# Patient Record
Sex: FEMALE | Race: WHITE | NOT HISPANIC OR LATINO | Employment: OTHER | URBAN - NONMETROPOLITAN AREA
[De-identification: names, ages, dates, MRNs, and addresses within clinical notes are randomized per-mention and may not be internally consistent; named-entity substitution may affect disease eponyms.]

---

## 2017-02-27 ENCOUNTER — APPOINTMENT (OUTPATIENT)
Dept: CT IMAGING | Facility: HOSPITAL | Age: 47
End: 2017-02-27

## 2017-02-27 ENCOUNTER — HOSPITAL ENCOUNTER (EMERGENCY)
Facility: HOSPITAL | Age: 47
Discharge: HOME | End: 2017-02-27
Attending: EMERGENCY MEDICINE | Admitting: FAMILY MEDICINE

## 2017-02-27 ENCOUNTER — APPOINTMENT (OUTPATIENT)
Dept: GENERAL RADIOLOGY | Facility: HOSPITAL | Age: 47
End: 2017-02-27

## 2017-02-27 VITALS
RESPIRATION RATE: 18 BRPM | DIASTOLIC BLOOD PRESSURE: 79 MMHG | HEART RATE: 66 BPM | HEIGHT: 63 IN | SYSTOLIC BLOOD PRESSURE: 105 MMHG | WEIGHT: 160 LBS | TEMPERATURE: 98.1 F | BODY MASS INDEX: 28.35 KG/M2 | OXYGEN SATURATION: 100 %

## 2017-02-27 DIAGNOSIS — R07.9 CHEST PAIN OF UNKNOWN ETIOLOGY: Primary | ICD-10-CM

## 2017-02-27 LAB
ALBUMIN SERPL-MCNC: 3.8 G/DL (ref 3.5–5)
ALBUMIN/GLOB SERPL: 1.2 G/DL (ref 1.1–2.5)
ALP SERPL-CCNC: 59 U/L (ref 24–120)
ALT SERPL W P-5'-P-CCNC: 21 U/L (ref 0–54)
ANION GAP SERPL CALCULATED.3IONS-SCNC: 9 MMOL/L (ref 4–13)
AST SERPL-CCNC: 18 U/L (ref 7–45)
BASOPHILS # BLD AUTO: 0.02 10*3/MM3 (ref 0–0.2)
BASOPHILS NFR BLD AUTO: 0.3 % (ref 0–2)
BILIRUB SERPL-MCNC: 0.7 MG/DL (ref 0.1–1)
BUN BLD-MCNC: 13 MG/DL (ref 5–21)
BUN/CREAT SERPL: 20 (ref 7–25)
CALCIUM SPEC-SCNC: 9 MG/DL (ref 8.4–10.4)
CHLORIDE SERPL-SCNC: 110 MMOL/L (ref 98–110)
CO2 SERPL-SCNC: 22 MMOL/L (ref 24–31)
CREAT BLD-MCNC: 0.65 MG/DL (ref 0.5–1.4)
D DIMER PPP FEU-MCNC: <0.22 MG/L (FEU) (ref 0–0.5)
DEPRECATED RDW RBC AUTO: 48.2 FL (ref 40–54)
EOSINOPHIL # BLD AUTO: 0.69 10*3/MM3 (ref 0–0.7)
EOSINOPHIL NFR BLD AUTO: 10.9 % (ref 0–4)
ERYTHROCYTE [DISTWIDTH] IN BLOOD BY AUTOMATED COUNT: 14.7 % (ref 12–15)
GFR SERPL CREATININE-BSD FRML MDRD: 99 ML/MIN/1.73
GLOBULIN UR ELPH-MCNC: 3.1 GM/DL
GLUCOSE BLD-MCNC: 83 MG/DL (ref 70–100)
HCT VFR BLD AUTO: 42.3 % (ref 37–47)
HGB BLD-MCNC: 14.2 G/DL (ref 12–16)
HOLD SPECIMEN: NORMAL
HOLD SPECIMEN: NORMAL
IMM GRANULOCYTES # BLD: 0 10*3/MM3 (ref 0–0.03)
IMM GRANULOCYTES NFR BLD: 0 % (ref 0–5)
INR PPP: 0.9 (ref 0.91–1.09)
LYMPHOCYTES # BLD AUTO: 1.95 10*3/MM3 (ref 0.72–4.86)
LYMPHOCYTES NFR BLD AUTO: 30.8 % (ref 15–45)
MCH RBC QN AUTO: 30 PG (ref 28–32)
MCHC RBC AUTO-ENTMCNC: 33.6 G/DL (ref 33–36)
MCV RBC AUTO: 89.4 FL (ref 82–98)
MONOCYTES # BLD AUTO: 0.35 10*3/MM3 (ref 0.19–1.3)
MONOCYTES NFR BLD AUTO: 5.5 % (ref 4–12)
NEUTROPHILS # BLD AUTO: 3.33 10*3/MM3 (ref 1.87–8.4)
NEUTROPHILS NFR BLD AUTO: 52.5 % (ref 39–78)
PLAT MORPH BLD: NORMAL
PLATELET # BLD AUTO: 157 10*3/MM3 (ref 130–400)
PMV BLD AUTO: 0 FL (ref 6–12)
POTASSIUM BLD-SCNC: 4 MMOL/L (ref 3.5–5.3)
PROT SERPL-MCNC: 6.9 G/DL (ref 6.3–8.7)
PROTHROMBIN TIME: 12.4 SECONDS (ref 11.9–14.6)
RBC # BLD AUTO: 4.73 10*6/MM3 (ref 4.2–5.4)
RBC MORPH BLD: NORMAL
SODIUM BLD-SCNC: 141 MMOL/L (ref 135–145)
TROPONIN I SERPL-MCNC: 0 NG/ML (ref 0–0.07)
TROPONIN I SERPL-MCNC: 0 NG/ML (ref 0–0.07)
WBC MORPH BLD: NORMAL
WBC NRBC COR # BLD: 6.34 10*3/MM3 (ref 4.8–10.8)
WHOLE BLOOD HOLD SPECIMEN: NORMAL
WHOLE BLOOD HOLD SPECIMEN: NORMAL

## 2017-02-27 PROCEDURE — 93005 ELECTROCARDIOGRAM TRACING: CPT | Performed by: FAMILY MEDICINE

## 2017-02-27 PROCEDURE — 93005 ELECTROCARDIOGRAM TRACING: CPT

## 2017-02-27 PROCEDURE — 71010 HC CHEST PA OR AP: CPT

## 2017-02-27 PROCEDURE — 0 IOPAMIDOL PER 1 ML: Performed by: FAMILY MEDICINE

## 2017-02-27 PROCEDURE — 85379 FIBRIN DEGRADATION QUANT: CPT | Performed by: EMERGENCY MEDICINE

## 2017-02-27 PROCEDURE — 93010 ELECTROCARDIOGRAM REPORT: CPT | Performed by: INTERNAL MEDICINE

## 2017-02-27 PROCEDURE — 80053 COMPREHEN METABOLIC PANEL: CPT | Performed by: EMERGENCY MEDICINE

## 2017-02-27 PROCEDURE — 96360 HYDRATION IV INFUSION INIT: CPT

## 2017-02-27 PROCEDURE — 84484 ASSAY OF TROPONIN QUANT: CPT

## 2017-02-27 PROCEDURE — 71275 CT ANGIOGRAPHY CHEST: CPT

## 2017-02-27 PROCEDURE — 85610 PROTHROMBIN TIME: CPT | Performed by: EMERGENCY MEDICINE

## 2017-02-27 PROCEDURE — 99284 EMERGENCY DEPT VISIT MOD MDM: CPT

## 2017-02-27 PROCEDURE — 85007 BL SMEAR W/DIFF WBC COUNT: CPT | Performed by: EMERGENCY MEDICINE

## 2017-02-27 PROCEDURE — 96361 HYDRATE IV INFUSION ADD-ON: CPT

## 2017-02-27 PROCEDURE — 36415 COLL VENOUS BLD VENIPUNCTURE: CPT

## 2017-02-27 PROCEDURE — 85025 COMPLETE CBC W/AUTO DIFF WBC: CPT | Performed by: EMERGENCY MEDICINE

## 2017-02-27 RX ORDER — BUTALBITAL, ACETAMINOPHEN AND CAFFEINE 50; 325; 40 MG/1; MG/1; MG/1
1 TABLET ORAL DAILY PRN
COMMUNITY

## 2017-02-27 RX ORDER — MECLIZINE HYDROCHLORIDE 25 MG/1
25 TABLET ORAL 3 TIMES DAILY PRN
COMMUNITY

## 2017-02-27 RX ORDER — OXYCODONE AND ACETAMINOPHEN 10; 325 MG/1; MG/1
1 TABLET ORAL 3 TIMES DAILY PRN
Status: ON HOLD | COMMUNITY
End: 2021-12-20

## 2017-02-27 RX ORDER — ZOLPIDEM TARTRATE 10 MG/1
10 TABLET ORAL NIGHTLY PRN
COMMUNITY
End: 2019-08-19

## 2017-02-27 RX ORDER — DULOXETIN HYDROCHLORIDE 60 MG/1
90 CAPSULE, DELAYED RELEASE ORAL NIGHTLY
COMMUNITY

## 2017-02-27 RX ORDER — GABAPENTIN 300 MG/1
300 CAPSULE ORAL 3 TIMES DAILY
COMMUNITY

## 2017-02-27 RX ORDER — SODIUM CHLORIDE 0.9 % (FLUSH) 0.9 %
10 SYRINGE (ML) INJECTION AS NEEDED
Status: DISCONTINUED | OUTPATIENT
Start: 2017-02-27 | End: 2017-02-27 | Stop reason: HOSPADM

## 2017-02-27 RX ORDER — TIZANIDINE 4 MG/1
4 TABLET ORAL 3 TIMES DAILY PRN
COMMUNITY
End: 2019-08-19

## 2017-02-27 RX ORDER — ASPIRIN 81 MG/1
324 TABLET, CHEWABLE ORAL ONCE
Status: DISCONTINUED | OUTPATIENT
Start: 2017-02-27 | End: 2017-02-27 | Stop reason: HOSPADM

## 2017-02-27 RX ORDER — TOPIRAMATE 50 MG/1
100 TABLET, FILM COATED ORAL DAILY
COMMUNITY

## 2017-02-27 RX ADMIN — SODIUM CHLORIDE 500 ML: 0.9 INJECTION, SOLUTION INTRAVENOUS at 20:40

## 2017-02-27 RX ADMIN — SODIUM CHLORIDE 750 ML: 9 INJECTION, SOLUTION INTRAVENOUS at 17:57

## 2017-02-27 RX ADMIN — IOPAMIDOL 100 ML: 755 INJECTION, SOLUTION INTRAVENOUS at 19:37

## 2017-03-02 ENCOUNTER — HOSPITAL ENCOUNTER (OUTPATIENT)
Dept: CARDIOLOGY | Facility: HOSPITAL | Age: 47
Discharge: HOME | End: 2017-03-02
Attending: FAMILY MEDICINE | Admitting: FAMILY MEDICINE

## 2017-03-02 VITALS — DIASTOLIC BLOOD PRESSURE: 66 MMHG | HEART RATE: 65 BPM | SYSTOLIC BLOOD PRESSURE: 95 MMHG

## 2017-03-02 DIAGNOSIS — R07.9 CHEST PAIN OF UNKNOWN ETIOLOGY: ICD-10-CM

## 2017-03-02 PROCEDURE — 93352 ADMIN ECG CONTRAST AGENT: CPT | Performed by: INTERNAL MEDICINE

## 2017-03-02 PROCEDURE — 25010000002 PERFLUTREN (DEFINITY) 8.476 MG IN SODIUM CHLORIDE 10 ML INJECTION: Performed by: INTERNAL MEDICINE

## 2017-03-02 PROCEDURE — 93350 STRESS TTE ONLY: CPT

## 2017-03-02 PROCEDURE — 93351 STRESS TTE COMPLETE: CPT

## 2017-03-02 PROCEDURE — 93350 STRESS TTE ONLY: CPT | Performed by: INTERNAL MEDICINE

## 2017-03-02 PROCEDURE — 93018 CV STRESS TEST I&R ONLY: CPT | Performed by: INTERNAL MEDICINE

## 2017-03-02 PROCEDURE — 93017 CV STRESS TEST TRACING ONLY: CPT

## 2017-03-02 RX ADMIN — SODIUM CHLORIDE 5 ML: 9 INJECTION INTRAMUSCULAR; INTRAVENOUS; SUBCUTANEOUS at 14:04

## 2017-03-02 RX ADMIN — SODIUM CHLORIDE 5 ML: 9 INJECTION INTRAMUSCULAR; INTRAVENOUS; SUBCUTANEOUS at 13:50

## 2017-03-03 LAB
BH CV ECHO MEAS - BSA(HAYCOCK): 1.8 M^2
BH CV ECHO MEAS - BSA: 1.8 M^2
BH CV ECHO MEAS - BZI_BMI: 28.3 KILOGRAMS/M^2
BH CV ECHO MEAS - BZI_METRIC_HEIGHT: 160 CM
BH CV ECHO MEAS - BZI_METRIC_WEIGHT: 72.6 KG
BH CV STRESS BP STAGE 1: NORMAL
BH CV STRESS BP STAGE 2: NORMAL
BH CV STRESS DURATION MIN STAGE 1: 3
BH CV STRESS DURATION MIN STAGE 2: 2
BH CV STRESS DURATION SEC STAGE 1: 0
BH CV STRESS DURATION SEC STAGE 2: 39
BH CV STRESS GRADE STAGE 1: 10
BH CV STRESS GRADE STAGE 2: 12
BH CV STRESS HR STAGE 1: 107
BH CV STRESS HR STAGE 2: 134
BH CV STRESS METS STAGE 1: 5
BH CV STRESS METS STAGE 2: 7.5
BH CV STRESS PROTOCOL 1: NORMAL
BH CV STRESS RECOVERY BP: NORMAL MMHG
BH CV STRESS RECOVERY HR: 73 BPM
BH CV STRESS SPEED STAGE 1: 1.7
BH CV STRESS SPEED STAGE 2: 2.5
BH CV STRESS STAGE 1: 1
BH CV STRESS STAGE 2: 2
MAXIMAL PREDICTED HEART RATE: 175 BPM
PERCENT MAX PREDICTED HR: 76.57 %
STRESS BASELINE BP: NORMAL MMHG
STRESS BASELINE HR: 68 BPM
STRESS PERCENT HR: 90 %
STRESS POST ESTIMATED WORKLOAD: 7.5 METS
STRESS POST EXERCISE DUR MIN: 5 MIN
STRESS POST EXERCISE DUR SEC: 39 SEC
STRESS POST PEAK BP: NORMAL MMHG
STRESS POST PEAK HR: 134 BPM
STRESS TARGET HR: 149 BPM

## 2017-03-06 ENCOUNTER — APPOINTMENT (OUTPATIENT)
Dept: CARDIOLOGY | Facility: HOSPITAL | Age: 47
End: 2017-03-06
Attending: FAMILY MEDICINE

## 2019-08-19 ENCOUNTER — HOSPITAL ENCOUNTER (EMERGENCY)
Facility: HOSPITAL | Age: 49
Discharge: HOME | End: 2019-08-19
Attending: EMERGENCY MEDICINE | Admitting: EMERGENCY MEDICINE

## 2019-08-19 ENCOUNTER — APPOINTMENT (OUTPATIENT)
Dept: GENERAL RADIOLOGY | Facility: HOSPITAL | Age: 49
End: 2019-08-19

## 2019-08-19 VITALS
SYSTOLIC BLOOD PRESSURE: 123 MMHG | BODY MASS INDEX: 35.44 KG/M2 | TEMPERATURE: 97.8 F | HEART RATE: 65 BPM | RESPIRATION RATE: 18 BRPM | WEIGHT: 200 LBS | HEIGHT: 63 IN | OXYGEN SATURATION: 98 % | DIASTOLIC BLOOD PRESSURE: 89 MMHG

## 2019-08-19 DIAGNOSIS — R07.9 CHEST PAIN DUE TO GERD: Primary | ICD-10-CM

## 2019-08-19 DIAGNOSIS — K21.9 CHEST PAIN DUE TO GERD: Primary | ICD-10-CM

## 2019-08-19 LAB
ALBUMIN SERPL-MCNC: 3.6 G/DL (ref 3.5–5)
ALBUMIN/GLOB SERPL: 1.1 G/DL (ref 1.1–2.5)
ALP SERPL-CCNC: 67 U/L (ref 24–120)
ALT SERPL W P-5'-P-CCNC: 52 U/L (ref 0–54)
ANION GAP SERPL CALCULATED.3IONS-SCNC: 8 MMOL/L (ref 4–13)
AST SERPL-CCNC: 49 U/L (ref 7–45)
BASOPHILS # BLD AUTO: 0.03 10*3/MM3 (ref 0–0.2)
BASOPHILS NFR BLD AUTO: 0.4 % (ref 0–1.5)
BILIRUB SERPL-MCNC: 0.4 MG/DL (ref 0.1–1)
BUN BLD-MCNC: 11 MG/DL (ref 5–21)
BUN/CREAT SERPL: 13.3 (ref 7–25)
CALCIUM SPEC-SCNC: 9.1 MG/DL (ref 8.4–10.4)
CHLORIDE SERPL-SCNC: 109 MMOL/L (ref 98–110)
CO2 SERPL-SCNC: 24 MMOL/L (ref 24–31)
CREAT BLD-MCNC: 0.83 MG/DL (ref 0.5–1.4)
DEPRECATED RDW RBC AUTO: 70.2 FL (ref 37–54)
EOSINOPHIL # BLD AUTO: 0.53 10*3/MM3 (ref 0–0.4)
EOSINOPHIL NFR BLD AUTO: 6.4 % (ref 0.3–6.2)
ERYTHROCYTE [DISTWIDTH] IN BLOOD BY AUTOMATED COUNT: 20.9 % (ref 12.3–15.4)
GFR SERPL CREATININE-BSD FRML MDRD: 74 ML/MIN/1.73
GLOBULIN UR ELPH-MCNC: 3.3 GM/DL
GLUCOSE BLD-MCNC: 88 MG/DL (ref 70–100)
HCT VFR BLD AUTO: 36.1 % (ref 34–46.6)
HGB BLD-MCNC: 11.3 G/DL (ref 12–15.9)
HOLD SPECIMEN: NORMAL
HOLD SPECIMEN: NORMAL
IMM GRANULOCYTES # BLD AUTO: 0.03 10*3/MM3 (ref 0–0.05)
IMM GRANULOCYTES NFR BLD AUTO: 0.4 % (ref 0–0.5)
LIPASE SERPL-CCNC: 114 U/L (ref 23–203)
LYMPHOCYTES # BLD AUTO: 2.79 10*3/MM3 (ref 0.7–3.1)
LYMPHOCYTES NFR BLD AUTO: 33.7 % (ref 19.6–45.3)
MCH RBC QN AUTO: 28.7 PG (ref 26.6–33)
MCHC RBC AUTO-ENTMCNC: 31.3 G/DL (ref 31.5–35.7)
MCV RBC AUTO: 91.6 FL (ref 79–97)
MONOCYTES # BLD AUTO: 0.56 10*3/MM3 (ref 0.1–0.9)
MONOCYTES NFR BLD AUTO: 6.8 % (ref 5–12)
NEUTROPHILS # BLD AUTO: 4.34 10*3/MM3 (ref 1.7–7)
NEUTROPHILS NFR BLD AUTO: 52.3 % (ref 42.7–76)
NRBC BLD AUTO-RTO: 0 /100 WBC (ref 0–0.2)
PLATELET # BLD AUTO: 202 10*3/MM3 (ref 140–450)
PMV BLD AUTO: 14.7 FL (ref 6–12)
POTASSIUM BLD-SCNC: 3.6 MMOL/L (ref 3.5–5.3)
PROT SERPL-MCNC: 6.9 G/DL (ref 6.3–8.7)
RBC # BLD AUTO: 3.94 10*6/MM3 (ref 3.77–5.28)
SODIUM BLD-SCNC: 141 MMOL/L (ref 135–145)
TROPONIN I SERPL-MCNC: <0.012 NG/ML (ref 0–0.03)
WBC NRBC COR # BLD: 8.28 10*3/MM3 (ref 3.4–10.8)
WHOLE BLOOD HOLD SPECIMEN: NORMAL
WHOLE BLOOD HOLD SPECIMEN: NORMAL

## 2019-08-19 PROCEDURE — 83690 ASSAY OF LIPASE: CPT | Performed by: EMERGENCY MEDICINE

## 2019-08-19 PROCEDURE — 93010 ELECTROCARDIOGRAM REPORT: CPT | Performed by: INTERNAL MEDICINE

## 2019-08-19 PROCEDURE — 84484 ASSAY OF TROPONIN QUANT: CPT | Performed by: EMERGENCY MEDICINE

## 2019-08-19 PROCEDURE — 85025 COMPLETE CBC W/AUTO DIFF WBC: CPT | Performed by: EMERGENCY MEDICINE

## 2019-08-19 PROCEDURE — 71045 X-RAY EXAM CHEST 1 VIEW: CPT

## 2019-08-19 PROCEDURE — 93005 ELECTROCARDIOGRAM TRACING: CPT

## 2019-08-19 PROCEDURE — 80053 COMPREHEN METABOLIC PANEL: CPT | Performed by: EMERGENCY MEDICINE

## 2019-08-19 PROCEDURE — 99284 EMERGENCY DEPT VISIT MOD MDM: CPT

## 2019-08-19 RX ORDER — OMEPRAZOLE 20 MG/1
20 CAPSULE, DELAYED RELEASE ORAL 2 TIMES DAILY
Qty: 30 CAPSULE | Refills: 0 | Status: ON HOLD | OUTPATIENT
Start: 2019-08-19 | End: 2021-12-21

## 2019-08-19 RX ORDER — SODIUM CHLORIDE 0.9 % (FLUSH) 0.9 %
10 SYRINGE (ML) INJECTION AS NEEDED
Status: DISCONTINUED | OUTPATIENT
Start: 2019-08-19 | End: 2019-08-19 | Stop reason: HOSPADM

## 2019-08-19 RX ORDER — PANTOPRAZOLE SODIUM 40 MG/1
40 TABLET, DELAYED RELEASE ORAL ONCE
Status: COMPLETED | OUTPATIENT
Start: 2019-08-19 | End: 2019-08-19

## 2019-08-19 RX ADMIN — PANTOPRAZOLE SODIUM 40 MG: 40 TABLET, DELAYED RELEASE ORAL at 06:22

## 2019-08-19 NOTE — ED PROVIDER NOTES
Subjective   Patient complains of chest pain that began yesterday and is continued all during the day and during the night until this morning when she finally came in to be checked out.  She says she first thought it was her reflux disease but then says she is been taking some zantac and has not helped so she was worried it was not her reflux disease.  She points to the upper abdomen and up into the chest and describes it is a dull pain.  She says is worse whenever she swallows and it feels like she gets something hung in her throat and then has a tough time going through whenever she swallows even though there is nothing in her throat.  She then says that the pain is continued since the reflux medicine was not helping she then became convinced it was her heart or her pacemaker and came in to be checked out.        History provided by:  Patient   used: No    Chest Pain   Pain location:  Substernal area  Pain quality: aching and dull    Pain radiates to:  Does not radiate  Pain severity:  Severe  Onset quality:  Gradual  Duration:  1 day  Timing:  Constant  Progression:  Unchanged  Chronicity:  New  Context: not breathing, not drug use, not eating, not intercourse, not lifting, not movement, not raising an arm, not at rest, not stress and not trauma    Relieved by:  Nothing  Worsened by:  Nothing (swallowing)  Ineffective treatments:  None tried  Associated symptoms: abdominal pain and heartburn    Associated symptoms: no AICD problem, no anxiety, no back pain, no claudication, no dysphagia, no fatigue, no fever, no nausea, no numbness, no palpitations, no PND, no shortness of breath and no vomiting    Risk factors: no aortic disease, no birth control, no coronary artery disease, no high cholesterol, no hypertension, no immobilization, not male, not obese, not pregnant and no prior DVT/PE        Review of Systems   Constitutional: Negative.  Negative for fatigue and fever.   HENT: Negative.   Negative for trouble swallowing.    Respiratory: Negative.  Negative for shortness of breath.    Cardiovascular: Positive for chest pain. Negative for palpitations, claudication and PND.   Gastrointestinal: Positive for abdominal pain and heartburn. Negative for nausea and vomiting.   Genitourinary: Negative.    Musculoskeletal: Negative.  Negative for back pain.   Skin: Negative.    Neurological: Negative.  Negative for numbness.   Psychiatric/Behavioral: Negative.    All other systems reviewed and are negative.      Past Medical History:   Diagnosis Date   • Anxiety    • GERD (gastroesophageal reflux disease)    • Injury of back    • Neuropathy    • Spondylisthesis        Allergies   Allergen Reactions   • Erythromycin Shortness Of Breath   • Penicillins Hives   • Ampicillin Hives       Past Surgical History:   Procedure Laterality Date   • BREAST SURGERY     • CHOLECYSTECTOMY     • DILATATION AND CURETTAGE     • GASTRIC SLEEVE LAPAROSCOPIC     • INSERT / REPLACE / REMOVE PACEMAKER     • PACEMAKER IMPLANTATION         History reviewed. No pertinent family history.    Social History     Socioeconomic History   • Marital status:      Spouse name: Not on file   • Number of children: Not on file   • Years of education: Not on file   • Highest education level: Not on file   Tobacco Use   • Smoking status: Current Every Day Smoker     Packs/day: 1.00     Types: Cigarettes   Substance and Sexual Activity   • Alcohol use: No   • Drug use: No   • Sexual activity: Defer       Prior to Admission medications    Medication Sig Start Date End Date Taking? Authorizing Provider   butalbital-acetaminophen-caffeine (FIORICET, ESGIC) -40 MG per tablet Take 1 tablet by mouth Daily As Needed for headaches.   Yes ProviderArslan MD   DULoxetine (CYMBALTA) 60 MG capsule Take 60 mg by mouth Every Night.   Yes ProviderArslan MD   gabapentin (NEURONTIN) 300 MG capsule Take 300 mg by mouth 3 (Three) Times a Day.    Yes Arslan Anderson MD   meclizine (ANTIVERT) 25 MG tablet Take 25 mg by mouth 3 (Three) Times a Day As Needed for dizziness.   Yes Arslan Anderson MD   oxyCODONE-acetaminophen (PERCOCET)  MG per tablet Take 1 tablet by mouth 3 (Three) Times a Day As Needed for moderate pain (4-6).   Yes Arslan Anderson MD   topiramate (TOPAMAX) 50 MG tablet Take 50 mg by mouth 2 (Two) Times a Day.   Yes Arslan Anderson MD   tiZANidine (ZANAFLEX) 4 MG tablet Take 4 mg by mouth 3 (Three) Times a Day As Needed for muscle spasms.  8/19/19  Arslan Anderson MD   zolpidem (AMBIEN) 10 MG tablet Take 10 mg by mouth At Night As Needed for sleep.  8/19/19  Arslan Anderson MD       Medications   sodium chloride 0.9 % flush 10 mL (not administered)   pantoprazole (PROTONIX) EC tablet 40 mg (not administered)       Vitals:    08/19/19 0517   BP: 139/89   Pulse:    Resp: 18   Temp:    SpO2: 100%         Objective   Physical Exam   Constitutional: She is oriented to person, place, and time. She appears well-developed and well-nourished.   HENT:   Head: Normocephalic and atraumatic.   Neck: Normal range of motion.   Cardiovascular: Normal rate and regular rhythm.   Pulmonary/Chest: Effort normal and breath sounds normal.   Abdominal: Soft. Bowel sounds are normal.   Musculoskeletal: Normal range of motion.   Neurological: She is alert and oriented to person, place, and time.   Skin: Skin is warm and dry.   Psychiatric: She has a normal mood and affect. Her behavior is normal.   Nursing note and vitals reviewed.      Procedures         Lab Results (last 24 hours)     Procedure Component Value Units Date/Time    CBC & Differential [[id number]] Collected:  08/19/19 0514    Specimen:  Blood Updated:  08/19/19 0532    Narrative:       The following orders were created for panel order CBC & Differential.  Procedure                               Abnormality         Status                     ---------                                -----------         ------                     CBC Auto Differential[[id number]]        Abnormal            Final result                 Please view results for these tests on the individual orders.    Comprehensive Metabolic Panel [[id number]]  (Abnormal) Collected:  08/19/19 0514    Specimen:  Blood Updated:  08/19/19 0539     Glucose 88 mg/dL      BUN 11 mg/dL      Creatinine 0.83 mg/dL      Sodium 141 mmol/L      Potassium 3.6 mmol/L      Chloride 109 mmol/L      CO2 24.0 mmol/L      Calcium 9.1 mg/dL      Total Protein 6.9 g/dL      Albumin 3.60 g/dL      ALT (SGPT) 52 U/L      AST (SGOT) 49 U/L      Alkaline Phosphatase 67 U/L      Total Bilirubin 0.4 mg/dL      eGFR Non African Amer 74 mL/min/1.73      Globulin 3.3 gm/dL      A/G Ratio 1.1 g/dL      BUN/Creatinine Ratio 13.3     Anion Gap 8.0 mmol/L     Narrative:       GFR Normal >60  Chronic Kidney Disease <60  Kidney Failure <15    Lipase [[id number]]  (Normal) Collected:  08/19/19 0514    Specimen:  Blood Updated:  08/19/19 0539     Lipase 114 U/L     Troponin [[id number]]  (Normal) Collected:  08/19/19 0514    Specimen:  Blood Updated:  08/19/19 0550     Troponin I <0.012 ng/mL     CBC Auto Differential [[id number]]  (Abnormal) Collected:  08/19/19 0514    Specimen:  Blood Updated:  08/19/19 0532     WBC 8.28 10*3/mm3      RBC 3.94 10*6/mm3      Hemoglobin 11.3 g/dL      Hematocrit 36.1 %      MCV 91.6 fL      MCH 28.7 pg      MCHC 31.3 g/dL      RDW 20.9 %      RDW-SD 70.2 fl      MPV 14.7 fL      Platelets 202 10*3/mm3      Neutrophil % 52.3 %      Lymphocyte % 33.7 %      Monocyte % 6.8 %      Eosinophil % 6.4 %      Basophil % 0.4 %      Immature Grans % 0.4 %      Neutrophils, Absolute 4.34 10*3/mm3      Lymphocytes, Absolute 2.79 10*3/mm3      Monocytes, Absolute 0.56 10*3/mm3      Eosinophils, Absolute 0.53 10*3/mm3      Basophils, Absolute 0.03 10*3/mm3      Immature Grans, Absolute 0.03 10*3/mm3      nRBC 0.0 /100 WBC           XR  Chest 1 View   ED Interpretation   NAD          ED Course  ED Course as of Aug 19 0615   Mon Aug 19, 2019   0613 I told the patient that her testing here is all been perfectly normal.  Specifically her EKG is perfectly normal and her troponin is also 0 despite having had pain in her chest for almost 24 hours from what she describes to me.  I do not believe this is cardiac.  I believe this is reflux related.  She specifically says that the pain goes up into her throat and hurts worse whenever she swallows.  I will increase her antireflux medicine.  I will advise her to follow-up with her family physician for further evaluation as indicated.  She is discharged in stable condition.  [TR]      ED Course User Index  [TR] Eric Crews Jr., MD          MDM  Number of Diagnoses or Management Options  Chest pain due to GERD: new and requires workup     Amount and/or Complexity of Data Reviewed  Clinical lab tests: ordered and reviewed  Tests in the radiology section of CPT®: ordered and reviewed  Tests in the medicine section of CPT®: reviewed and ordered    Risk of Complications, Morbidity, and/or Mortality  Presenting problems: moderate  Diagnostic procedures: moderate  Management options: moderate    Patient Progress  Patient progress: stable      Final diagnoses:   Chest pain due to GERD          Eric Crews Jr., MD  08/19/19 0615

## 2021-12-20 ENCOUNTER — APPOINTMENT (OUTPATIENT)
Dept: CT IMAGING | Facility: HOSPITAL | Age: 51
End: 2021-12-20

## 2021-12-20 ENCOUNTER — HOSPITAL ENCOUNTER (INPATIENT)
Facility: HOSPITAL | Age: 51
LOS: 2 days | Discharge: HOME | End: 2021-12-22
Attending: EMERGENCY MEDICINE | Admitting: INTERNAL MEDICINE

## 2021-12-20 DIAGNOSIS — I26.99 PULMONARY EMBOLISM, BILATERAL (HCC): Primary | ICD-10-CM

## 2021-12-20 DIAGNOSIS — R06.00 DYSPNEA, UNSPECIFIED TYPE: ICD-10-CM

## 2021-12-20 PROBLEM — G89.29 CHRONIC BACK PAIN: Status: ACTIVE | Noted: 2021-12-20

## 2021-12-20 PROBLEM — E66.01 OBESITY, CLASS III, BMI 40-49.9 (MORBID OBESITY) (HCC): Status: ACTIVE | Noted: 2021-12-20

## 2021-12-20 PROBLEM — M54.9 CHRONIC BACK PAIN: Status: ACTIVE | Noted: 2021-12-20

## 2021-12-20 PROBLEM — Z72.0 TOBACCO ABUSE: Status: ACTIVE | Noted: 2021-12-20

## 2021-12-20 PROBLEM — D50.9 MICROCYTIC ANEMIA: Status: ACTIVE | Noted: 2021-12-20

## 2021-12-20 PROBLEM — E66.813 OBESITY, CLASS III, BMI 40-49.9 (MORBID OBESITY): Status: ACTIVE | Noted: 2021-12-20

## 2021-12-20 LAB
ABO GROUP BLD: NORMAL
ALBUMIN SERPL-MCNC: 3.2 G/DL (ref 3.5–5.2)
ALBUMIN/GLOB SERPL: 1 G/DL
ALP SERPL-CCNC: 76 U/L (ref 39–117)
ALT SERPL W P-5'-P-CCNC: 9 U/L (ref 1–33)
ANION GAP SERPL CALCULATED.3IONS-SCNC: 11 MMOL/L (ref 5–15)
ANISOCYTOSIS BLD QL: ABNORMAL
APTT PPP: 35.1 SECONDS (ref 24.1–35)
APTT PPP: 95.7 SECONDS (ref 24.1–35)
AST SERPL-CCNC: 14 U/L (ref 1–32)
BILIRUB SERPL-MCNC: 0.2 MG/DL (ref 0–1.2)
BLD GP AB SCN SERPL QL: NEGATIVE
BUN SERPL-MCNC: 9 MG/DL (ref 6–20)
BUN/CREAT SERPL: 13.4 (ref 7–25)
CALCIUM SPEC-SCNC: 8.6 MG/DL (ref 8.6–10.5)
CHLORIDE SERPL-SCNC: 107 MMOL/L (ref 98–107)
CO2 SERPL-SCNC: 22 MMOL/L (ref 22–29)
CREAT SERPL-MCNC: 0.67 MG/DL (ref 0.57–1)
D DIMER PPP FEU-MCNC: 1.22 MG/L (FEU) (ref 0–0.5)
DEPRECATED RDW RBC AUTO: 57.7 FL (ref 37–54)
DEVELOPER EXPIRATION DATE: NORMAL
DEVELOPER LOT NUMBER: 203
EOSINOPHIL # BLD MANUAL: 0.29 10*3/MM3 (ref 0–0.4)
EOSINOPHIL NFR BLD MANUAL: 4 % (ref 0.3–6.2)
ERYTHROCYTE [DISTWIDTH] IN BLOOD BY AUTOMATED COUNT: 23.2 % (ref 12.3–15.4)
EXPIRATION DATE: NORMAL
FECAL OCCULT BLOOD SCREEN, POC: NEGATIVE
GFR SERPL CREATININE-BSD FRML MDRD: 93 ML/MIN/1.73
GIANT PLATELETS: ABNORMAL
GLOBULIN UR ELPH-MCNC: 3.1 GM/DL
GLUCOSE SERPL-MCNC: 87 MG/DL (ref 65–99)
HBA1C MFR BLD: 5.3 % (ref 4.8–5.6)
HCT VFR BLD AUTO: 26.3 % (ref 34–46.6)
HGB BLD-MCNC: 7.4 G/DL (ref 12–15.9)
HYPOCHROMIA BLD QL: ABNORMAL
INR PPP: 1 (ref 0.91–1.09)
IRON 24H UR-MRATE: 10 MCG/DL (ref 37–145)
IRON SATN MFR SERPL: 2 % (ref 20–50)
LYMPHOCYTES # BLD MANUAL: 1.95 10*3/MM3 (ref 0.7–3.1)
LYMPHOCYTES NFR BLD MANUAL: 4 % (ref 5–12)
Lab: 203
MAGNESIUM SERPL-MCNC: 2 MG/DL (ref 1.6–2.6)
MCH RBC QN AUTO: 20.6 PG (ref 26.6–33)
MCHC RBC AUTO-ENTMCNC: 28.1 G/DL (ref 31.5–35.7)
MCV RBC AUTO: 73.3 FL (ref 79–97)
MICROCYTES BLD QL: ABNORMAL
MONOCYTES # BLD: 0.29 10*3/MM3 (ref 0.1–0.9)
NEGATIVE CONTROL: NEGATIVE
NEUTROPHILS # BLD AUTO: 4.63 10*3/MM3 (ref 1.7–7)
NEUTROPHILS NFR BLD MANUAL: 64.6 % (ref 42.7–76)
NRBC SPEC MANUAL: 2 /100 WBC (ref 0–0.2)
NT-PROBNP SERPL-MCNC: 5629 PG/ML (ref 0–900)
PLATELET # BLD AUTO: 291 10*3/MM3 (ref 140–450)
POIKILOCYTOSIS BLD QL SMEAR: ABNORMAL
POLYCHROMASIA BLD QL SMEAR: ABNORMAL
POSITIVE CONTROL: POSITIVE
POTASSIUM SERPL-SCNC: 3.8 MMOL/L (ref 3.5–5.2)
PROT SERPL-MCNC: 6.3 G/DL (ref 6–8.5)
PROTHROMBIN TIME: 12.8 SECONDS (ref 11.9–14.6)
RBC # BLD AUTO: 3.59 10*6/MM3 (ref 3.77–5.28)
RH BLD: POSITIVE
SARS-COV-2 RNA PNL SPEC NAA+PROBE: NOT DETECTED
SODIUM SERPL-SCNC: 140 MMOL/L (ref 136–145)
T&S EXPIRATION DATE: NORMAL
TIBC SERPL-MCNC: 450 MCG/DL (ref 298–536)
TRANSFERRIN SERPL-MCNC: 302 MG/DL (ref 200–360)
TROPONIN T SERPL-MCNC: <0.01 NG/ML (ref 0–0.03)
TROPONIN T SERPL-MCNC: <0.01 NG/ML (ref 0–0.03)
VARIANT LYMPHS NFR BLD MANUAL: 27.3 % (ref 19.6–45.3)
WBC MORPH BLD: NORMAL
WBC NRBC COR # BLD: 7.16 10*3/MM3 (ref 3.4–10.8)

## 2021-12-20 PROCEDURE — 83735 ASSAY OF MAGNESIUM: CPT | Performed by: EMERGENCY MEDICINE

## 2021-12-20 PROCEDURE — 84466 ASSAY OF TRANSFERRIN: CPT | Performed by: INTERNAL MEDICINE

## 2021-12-20 PROCEDURE — 84484 ASSAY OF TROPONIN QUANT: CPT | Performed by: EMERGENCY MEDICINE

## 2021-12-20 PROCEDURE — 83036 HEMOGLOBIN GLYCOSYLATED A1C: CPT | Performed by: NURSE PRACTITIONER

## 2021-12-20 PROCEDURE — 36415 COLL VENOUS BLD VENIPUNCTURE: CPT | Performed by: EMERGENCY MEDICINE

## 2021-12-20 PROCEDURE — 85730 THROMBOPLASTIN TIME PARTIAL: CPT | Performed by: EMERGENCY MEDICINE

## 2021-12-20 PROCEDURE — 86901 BLOOD TYPING SEROLOGIC RH(D): CPT

## 2021-12-20 PROCEDURE — 86901 BLOOD TYPING SEROLOGIC RH(D): CPT | Performed by: EMERGENCY MEDICINE

## 2021-12-20 PROCEDURE — 0 IOPAMIDOL PER 1 ML: Performed by: EMERGENCY MEDICINE

## 2021-12-20 PROCEDURE — 86920 COMPATIBILITY TEST SPIN: CPT

## 2021-12-20 PROCEDURE — 86900 BLOOD TYPING SEROLOGIC ABO: CPT | Performed by: EMERGENCY MEDICINE

## 2021-12-20 PROCEDURE — 80053 COMPREHEN METABOLIC PANEL: CPT | Performed by: EMERGENCY MEDICINE

## 2021-12-20 PROCEDURE — 83880 ASSAY OF NATRIURETIC PEPTIDE: CPT | Performed by: EMERGENCY MEDICINE

## 2021-12-20 PROCEDURE — 93010 ELECTROCARDIOGRAM REPORT: CPT | Performed by: INTERNAL MEDICINE

## 2021-12-20 PROCEDURE — 85610 PROTHROMBIN TIME: CPT | Performed by: EMERGENCY MEDICINE

## 2021-12-20 PROCEDURE — 94799 UNLISTED PULMONARY SVC/PX: CPT

## 2021-12-20 PROCEDURE — 85007 BL SMEAR W/DIFF WBC COUNT: CPT | Performed by: EMERGENCY MEDICINE

## 2021-12-20 PROCEDURE — 71275 CT ANGIOGRAPHY CHEST: CPT

## 2021-12-20 PROCEDURE — 99284 EMERGENCY DEPT VISIT MOD MDM: CPT

## 2021-12-20 PROCEDURE — 86900 BLOOD TYPING SEROLOGIC ABO: CPT

## 2021-12-20 PROCEDURE — 25010000002 HEPARIN (PORCINE) PER 1000 UNITS: Performed by: EMERGENCY MEDICINE

## 2021-12-20 PROCEDURE — 83540 ASSAY OF IRON: CPT | Performed by: INTERNAL MEDICINE

## 2021-12-20 PROCEDURE — 93005 ELECTROCARDIOGRAM TRACING: CPT | Performed by: EMERGENCY MEDICINE

## 2021-12-20 PROCEDURE — 85025 COMPLETE CBC W/AUTO DIFF WBC: CPT | Performed by: EMERGENCY MEDICINE

## 2021-12-20 PROCEDURE — 87635 SARS-COV-2 COVID-19 AMP PRB: CPT | Performed by: EMERGENCY MEDICINE

## 2021-12-20 PROCEDURE — 85379 FIBRIN DEGRADATION QUANT: CPT | Performed by: EMERGENCY MEDICINE

## 2021-12-20 PROCEDURE — 82270 OCCULT BLOOD FECES: CPT | Performed by: EMERGENCY MEDICINE

## 2021-12-20 PROCEDURE — 86850 RBC ANTIBODY SCREEN: CPT | Performed by: EMERGENCY MEDICINE

## 2021-12-20 RX ORDER — OXYCODONE AND ACETAMINOPHEN 7.5; 325 MG/1; MG/1
1 TABLET ORAL EVERY 8 HOURS PRN
Status: DISCONTINUED | OUTPATIENT
Start: 2021-12-20 | End: 2021-12-22 | Stop reason: HOSPADM

## 2021-12-20 RX ORDER — HEPARIN SODIUM 10000 [USP'U]/100ML
1500 INJECTION, SOLUTION INTRAVENOUS
Status: DISCONTINUED | OUTPATIENT
Start: 2021-12-20 | End: 2021-12-22

## 2021-12-20 RX ORDER — DULOXETIN HYDROCHLORIDE 30 MG/1
90 CAPSULE, DELAYED RELEASE ORAL NIGHTLY
Status: DISCONTINUED | OUTPATIENT
Start: 2021-12-21 | End: 2021-12-22 | Stop reason: HOSPADM

## 2021-12-20 RX ORDER — IPRATROPIUM BROMIDE AND ALBUTEROL SULFATE 2.5; .5 MG/3ML; MG/3ML
3 SOLUTION RESPIRATORY (INHALATION) EVERY 4 HOURS PRN
Status: DISCONTINUED | OUTPATIENT
Start: 2021-12-20 | End: 2021-12-22 | Stop reason: HOSPADM

## 2021-12-20 RX ORDER — SODIUM CHLORIDE 0.9 % (FLUSH) 0.9 %
10 SYRINGE (ML) INJECTION AS NEEDED
Status: DISCONTINUED | OUTPATIENT
Start: 2021-12-20 | End: 2021-12-22 | Stop reason: HOSPADM

## 2021-12-20 RX ORDER — ROPINIROLE 1 MG/1
1 TABLET, FILM COATED ORAL 2 TIMES DAILY
Status: DISCONTINUED | OUTPATIENT
Start: 2021-12-21 | End: 2021-12-22 | Stop reason: HOSPADM

## 2021-12-20 RX ORDER — OXYCODONE AND ACETAMINOPHEN 7.5; 325 MG/1; MG/1
1 TABLET ORAL 3 TIMES DAILY PRN
COMMUNITY

## 2021-12-20 RX ORDER — SODIUM CHLORIDE 0.9 % (FLUSH) 0.9 %
10 SYRINGE (ML) INJECTION EVERY 12 HOURS SCHEDULED
Status: DISCONTINUED | OUTPATIENT
Start: 2021-12-20 | End: 2021-12-22 | Stop reason: HOSPADM

## 2021-12-20 RX ORDER — ACETAMINOPHEN 325 MG/1
650 TABLET ORAL EVERY 4 HOURS PRN
Status: DISCONTINUED | OUTPATIENT
Start: 2021-12-20 | End: 2021-12-22 | Stop reason: HOSPADM

## 2021-12-20 RX ORDER — ONDANSETRON 2 MG/ML
4 INJECTION INTRAMUSCULAR; INTRAVENOUS EVERY 6 HOURS PRN
Status: DISCONTINUED | OUTPATIENT
Start: 2021-12-20 | End: 2021-12-22 | Stop reason: HOSPADM

## 2021-12-20 RX ORDER — ROPINIROLE 1 MG/1
1 TABLET, FILM COATED ORAL 2 TIMES DAILY
COMMUNITY

## 2021-12-20 RX ORDER — GABAPENTIN 300 MG/1
300 CAPSULE ORAL 3 TIMES DAILY
Status: DISCONTINUED | OUTPATIENT
Start: 2021-12-21 | End: 2021-12-22 | Stop reason: HOSPADM

## 2021-12-20 RX ORDER — SODIUM CHLORIDE 9 MG/ML
100 INJECTION, SOLUTION INTRAVENOUS CONTINUOUS
Status: DISCONTINUED | OUTPATIENT
Start: 2021-12-20 | End: 2021-12-22

## 2021-12-20 RX ORDER — ALBUTEROL SULFATE 90 UG/1
2 AEROSOL, METERED RESPIRATORY (INHALATION) EVERY 4 HOURS PRN
COMMUNITY

## 2021-12-20 RX ORDER — HEPARIN SODIUM 1000 [USP'U]/ML
40 INJECTION, SOLUTION INTRAVENOUS; SUBCUTANEOUS AS NEEDED
Status: DISCONTINUED | OUTPATIENT
Start: 2021-12-20 | End: 2021-12-22

## 2021-12-20 RX ORDER — ESCITALOPRAM OXALATE 10 MG/1
10 TABLET ORAL DAILY
COMMUNITY

## 2021-12-20 RX ORDER — ALBUTEROL SULFATE 2.5 MG/3ML
2.5 SOLUTION RESPIRATORY (INHALATION) EVERY 4 HOURS PRN
COMMUNITY

## 2021-12-20 RX ORDER — HEPARIN SODIUM 1000 [USP'U]/ML
80 INJECTION, SOLUTION INTRAVENOUS; SUBCUTANEOUS AS NEEDED
Status: DISCONTINUED | OUTPATIENT
Start: 2021-12-20 | End: 2021-12-22

## 2021-12-20 RX ORDER — HEPARIN SODIUM 1000 [USP'U]/ML
80 INJECTION, SOLUTION INTRAVENOUS; SUBCUTANEOUS ONCE
Status: COMPLETED | OUTPATIENT
Start: 2021-12-20 | End: 2021-12-20

## 2021-12-20 RX ORDER — ONDANSETRON 4 MG/1
4 TABLET, FILM COATED ORAL EVERY 6 HOURS PRN
COMMUNITY

## 2021-12-20 RX ADMIN — HEPARIN SODIUM 1500 UNITS/HR: 10000 INJECTION, SOLUTION INTRAVENOUS at 17:02

## 2021-12-20 RX ADMIN — IOPAMIDOL 100 ML: 755 INJECTION, SOLUTION INTRAVENOUS at 15:18

## 2021-12-20 RX ADMIN — SODIUM CHLORIDE, PRESERVATIVE FREE 10 ML: 5 INJECTION INTRAVENOUS at 22:54

## 2021-12-20 RX ADMIN — SODIUM CHLORIDE 100 ML/HR: 9 INJECTION, SOLUTION INTRAVENOUS at 22:54

## 2021-12-20 RX ADMIN — HEPARIN SODIUM 8560 UNITS: 1000 INJECTION, SOLUTION INTRAVENOUS; SUBCUTANEOUS at 17:06

## 2021-12-20 NOTE — H&P
[facility] Medicine Services  HISTORY AND PHYSICAL    Date of Admission: 12/20/2021  Primary Care Physician: Sushil Nash MD    Subjective     Chief Complaint: Shortness of breath    History of Present Illness  Ms. Tabares is a 51-year-old  female who follows Dr. Sushil Nash for primary care.  She has a medical history significant for tobacco abuse, morbid obesity, and either TIA versus CVA.  She also has a pacemaker for unknown reasons.  She notes she received the [employer] Covid vaccine in April of this year and has had a lot of difficulty since that time.  She believes that she had a mini stroke at some point in May or June, but never sought any medical evaluation after this.  She has had a right facial droop and difficulty with her speech since this time.    The patient states on Monday she went to go see her primary care provider for a 3-month checkup and she felt in her usual state of health at that time.  She did get winded on the walk to her doctor's office, but she felt she was at her baseline at this point as she is usually short of breath with exertion.  She states by Tuesday her shortness of breath was much worse with minimal exertion and she has been slowly feeling worse since this time.  She has had no fever or chills.  She has had an infrequent, nonproductive cough.  She has had no chest pain or palpitations.  She has not noted any lower extremity edema.  She states she does have chronic charley horses that she gets every day which seem to be worse in the left leg.    Her sister who is at bedside works at Dr. Nash's office and states they have been monitoring the patient's anemia as they have noted it has been worsening.  She believes her last hemoglobin checked in the office was 8.9.  This is 7.4 today.  Occult blood stool in the emergency department was negative.  Patient denies any bleeding of any kind.  She does crave ice.    Review of  Systems   Constitutional: Negative.    HENT: Negative.    Respiratory: Positive for cough and shortness of breath.    Cardiovascular: Negative.    Gastrointestinal: Negative.    Genitourinary: Negative.    Musculoskeletal: Positive for myalgias.   Skin: Negative.    Neurological: Negative.    Psychiatric/Behavioral: Negative.    Otherwise complete ROS reviewed and negative except as mentioned in the HPI.    Past Medical History:   Past Medical History:   Diagnosis Date   • Anxiety    • GERD (gastroesophageal reflux disease)    • Injury of back    • Neuropathy    • Spondylisthesis      Past Surgical History:  Past Surgical History:   Procedure Laterality Date   • BREAST SURGERY     • CHOLECYSTECTOMY     • DILATATION AND CURETTAGE     • GASTRIC SLEEVE LAPAROSCOPIC     • INSERT / REPLACE / REMOVE PACEMAKER     • PACEMAKER IMPLANTATION       Social History:  reports that she has been smoking cigarettes. She has been smoking about 1.00 pack per day. She does not have any smokeless tobacco history on file. She reports that she does not drink alcohol and does not use drugs.    Family History: family history includes Cancer in her father; Stroke in her mother and sister.       Allergies:  Allergies   Allergen Reactions   • Erythromycin Shortness Of Breath   • Penicillins Hives   • Ampicillin Hives     Medications:  Prior to Admission medications    Medication Sig Start Date End Date Taking? Authorizing Provider   butalbital-acetaminophen-caffeine (FIORICET, ESGIC) -40 MG per tablet Take 1 tablet by mouth Daily As Needed for headaches.    Arslan Anderson MD   DULoxetine (CYMBALTA) 60 MG capsule Take 60 mg by mouth Every Night.    Arslan Anderson MD   gabapentin (NEURONTIN) 300 MG capsule Take 300 mg by mouth 3 (Three) Times a Day.    Arslan Anderson MD   meclizine (ANTIVERT) 25 MG tablet Take 25 mg by mouth 3 (Three) Times a Day As Needed for dizziness.    Arslan Anderson MD   omeprazole  "(priLOSEC) 20 MG capsule Take 1 capsule by mouth 2 (Two) Times a Day. 8/19/19   Eric Crews Jr., MD   oxyCODONE-acetaminophen (PERCOCET)  MG per tablet Take 1 tablet by mouth 3 (Three) Times a Day As Needed for moderate pain (4-6).    Provider, MD Arslan   topiramate (TOPAMAX) 50 MG tablet Take 50 mg by mouth 2 (Two) Times a Day.    Provider, Arslan, MD     Objective     Vital Signs: /86   Pulse 81   Temp 98 °F (36.7 °C) (Oral)   Resp 20   Ht 160 cm (63\")   Wt 107 kg (235 lb)   SpO2 95%   Breastfeeding No   BMI 41.63 kg/m²   Physical Exam  Vitals and nursing note reviewed.   Constitutional:       General: She is not in acute distress.     Appearance: She is obese. She is not ill-appearing.   HENT:      Head: Normocephalic and atraumatic.      Mouth/Throat:      Comments: Edentulous  Cardiovascular:      Rate and Rhythm: Normal rate and regular rhythm.      Pulses: Normal pulses.      Heart sounds: Normal heart sounds.   Pulmonary:      Effort: Pulmonary effort is normal.      Breath sounds: Normal breath sounds. No wheezing, rhonchi or rales.   Abdominal:      General: Bowel sounds are normal. There is no distension.      Palpations: Abdomen is soft.      Tenderness: There is no abdominal tenderness.   Musculoskeletal:         General: No swelling or tenderness. Normal range of motion.      Cervical back: Normal range of motion and neck supple. No tenderness.   Skin:     General: Skin is warm and dry.      Findings: No erythema or rash.   Neurological:      Mental Status: She is alert and oriented to person, place, and time.      Comments: Right facial droop   Psychiatric:         Mood and Affect: Mood normal.         Behavior: Behavior normal.         Thought Content: Thought content normal.         Judgment: Judgment normal.     Results Reviewed:  Lab Results (last 24 hours)     Procedure Component Value Units Date/Time    Troponin [[id number]]  (Normal) Collected: 12/20/21 1632 "    Specimen: Blood Updated: 12/20/21 1703     Troponin T <0.010 ng/mL     Narrative:      Troponin T Reference Range:  <= 0.03 ng/mL-   Negative for AMI  >0.03 ng/mL-     Abnormal for myocardial necrosis.  Clinicians would have to utilize clinical acumen, EKG, Troponin and serial changes to determine if it is an Acute Myocardial Infarction or myocardial injury due to an underlying chronic condition.       Results may be falsely decreased if patient taking Biotin.      POC Occult Blood Stool [[id number]]  (Normal) Collected: 12/20/21 1647    Specimen: Stool Updated: 12/20/21 1647     Fecal Occult Blood Negative     Lot Number [id number]     Expiration Date 4/30/22     DEVELOPER LOT NUMBER [id number]     DEVELOPER EXPIRATION DATE 4/30/22     Positive Control Positive     Negative Control Negative    aPTT [[id number]]  (Abnormal) Collected: 12/20/21 1321    Specimen: Blood Updated: 12/20/21 1627     PTT 35.1 seconds     COVID-19,[facility] IN-HOUSE,Nasal Swab No Transport Media 3-4 HR TAT - Swab, Nasal Cavity [[id number]]  (Normal) Collected: 12/20/21 1320    Specimen: Swab from Nasal Cavity Updated: 12/20/21 1420     COVID19 Not Detected    Narrative:      Fact sheet for providers: https://www.fda.gov/media/517139/download     Fact sheet for patients: https://www.fda.gov/media/804065/download    Test performed by PCR.    Consider negative results in combination with clinical observations, patient history, and epidemiological information.    Comprehensive Metabolic Panel [[id number]]  (Abnormal) Collected: 12/20/21 1321    Specimen: Blood Updated: 12/20/21 1402     Glucose 87 mg/dL      BUN 9 mg/dL      Creatinine 0.67 mg/dL      Sodium 140 mmol/L      Potassium 3.8 mmol/L      Comment: Slight hemolysis detected by analyzer. Results may be affected.        Chloride 107 mmol/L      CO2 22.0 mmol/L      Calcium 8.6 mg/dL      Total Protein 6.3 g/dL      Albumin 3.20 g/dL      ALT (SGPT) 9 U/L      AST (SGOT) 14 U/L      Alkaline Phosphatase  76 U/L      Total Bilirubin 0.2 mg/dL      eGFR Non African Amer 93 mL/min/1.73      Globulin 3.1 gm/dL      A/G Ratio 1.0 g/dL      BUN/Creatinine Ratio 13.4     Anion Gap 11.0 mmol/L     Narrative:      GFR Normal >60  Chronic Kidney Disease <60  Kidney Failure <15      Manual Differential [[id number]]  (Abnormal) Collected: 12/20/21 1320    Specimen: Blood Updated: 12/20/21 1402     Neutrophil % 64.6 %      Lymphocyte % 27.3 %      Monocyte % 4.0 %      Eosinophil % 4.0 %      Neutrophils Absolute 4.63 10*3/mm3      Lymphocytes Absolute 1.95 10*3/mm3      Monocytes Absolute 0.29 10*3/mm3      Eosinophils Absolute 0.29 10*3/mm3      nRBC 2.0 /100 WBC      Anisocytosis Mod/2+     Hypochromia Mod/2+     Microcytes Slight/1+     Poikilocytes Slight/1+     Polychromasia Large/3+     WBC Morphology Normal     Giant Platelets Mod/2+    CBC & Differential [[id number]]  (Abnormal) Collected: 12/20/21 1320    Specimen: Blood Updated: 12/20/21 1402    Narrative:      The following orders were created for panel order CBC & Differential.  Procedure                               Abnormality         Status                     ---------                               -----------         ------                     CBC Auto Differential[[id number]]        Abnormal            Final result                 Please view results for these tests on the individual orders.    CBC Auto Differential [[id number]]  (Abnormal) Collected: 12/20/21 1320    Specimen: Blood Updated: 12/20/21 1402     WBC 7.16 10*3/mm3      RBC 3.59 10*6/mm3      Hemoglobin 7.4 g/dL      Hematocrit 26.3 %      MCV 73.3 fL      MCH 20.6 pg      MCHC 28.1 g/dL      RDW 23.2 %      RDW-SD 57.7 fl      Platelets 291 10*3/mm3     Narrative:      ckd    BNP [[id number]]  (Abnormal) Collected: 12/20/21 1321    Specimen: Blood Updated: 12/20/21 1359     proBNP 5,629.0 pg/mL     Narrative:      Among patients with dyspnea, NT-proBNP is highly sensitive for the detection of acute  congestive heart failure. In addition NT-proBNP of <300 pg/ml effectively rules out acute congestive heart failure with 99% negative predictive value.    Results may be falsely decreased if patient taking Biotin.      Troponin [[id number]]  (Normal) Collected: 12/20/21 1321    Specimen: Blood Updated: 12/20/21 1358     Troponin T <0.010 ng/mL     Narrative:      Troponin T Reference Range:  <= 0.03 ng/mL-   Negative for AMI  >0.03 ng/mL-     Abnormal for myocardial necrosis.  Clinicians would have to utilize clinical acumen, EKG, Troponin and serial changes to determine if it is an Acute Myocardial Infarction or myocardial injury due to an underlying chronic condition.       Results may be falsely decreased if patient taking Biotin.      Magnesium [[id number]]  (Normal) Collected: 12/20/21 1321    Specimen: Blood Updated: 12/20/21 1357     Magnesium 2.0 mg/dL     Protime-INR [[id number]]  (Normal) Collected: 12/20/21 1321    Specimen: Blood Updated: 12/20/21 1356     Protime 12.8 Seconds      INR 1.00    D-dimer, Quantitative [[id number]]  (Abnormal) Collected: 12/20/21 1321    Specimen: Blood Updated: 12/20/21 1356     D-Dimer, Quantitative 1.22 mg/L (FEU)     Narrative:      Reference Range is 0-0.50 mg/L FEU. However, results <0.50 mg/L FEU tends to rule out DVT or PE. Results >0.50 mg/L FEU are not useful in predicting absence or presence of DVT or PE.          Imaging Results (Last 24 Hours)     Procedure Component Value Units Date/Time    CT Angiogram Chest [[id number]] Collected: 12/20/21 1531     Updated: 12/20/21 1541    Narrative:      CT ANGIOGRAM CHEST- 12/20/2021 3:14 PM CST     HISTORY: dyspnea, elevated dimer      COMPARISON: 2/27/2017     DOSE LENGTH PRODUCT: 532 mGy cm. Automated exposure control was also  utilized to decrease patient radiation dose.     TECHNIQUE: Axial images the chest are obtained following IV contrast.  2-D and maximal intensity projection images are reconstructed and  reviewed.      FINDINGS:  There are scattered bilateral pulmonary emboli involving the  distal main pulmonary arteries with emboli extending into all 5  segmental pulmonary artery branches. Thrombus burden slightly greater on  the right. RV to LV ratio slightly elevated 1.41 suggesting mild right  ventricular heart strain. There is mild cardiomegaly with a left  subclavian cardiac pacer device. Trace pericardial fluid. No pleural  effusions. No thoracic aortic aneurysm or dissection identified. No  pathologic intrathoracic or axillary lymphadenopathy. Fat necrosis  suspected within the superior left chest wall.     Postoperative changes of the stomach. Small hiatal hernia.  Cholecystectomy clips.     Calcified granuloma within the left upper lobe. Scattered Groundglass  opacities suspicious for multifocal pneumonia versus ischemia. No  pneumothorax. No discrete endobronchial lesion.     Mild degenerative change of the thoracic spine       Impression:      1. Diffuse bilateral pulmonary emboli involving the distal main  pulmonary arteries extending into the subsegmental branches. Mild right  ventricular heart strain with an RV to LV ratio measuring 1.41. Findings  called to Dr. Blue in the ER at 3:30 PM on 12/20/2021  2. Scattered groundglass opacities may represent multifocal pneumonia  and/or ischemic changes of the parenchyma.  This report was finalized on 12/20/2021 15:38 by Dr. Tati Troy MD.        I have personally reviewed and interpreted the radiology studies and ECG obtained at time of admission.     Assessment / Plan     Assessment:   Active Hospital Problems    Diagnosis    • Pulmonary embolism, bilateral (HCC)    • Microcytic anemia    • Tobacco abuse    • Chronic back pain    • Obesity, Class III, BMI 40-49.9 (morbid obesity) (HCC)      Plan:   1.  Admit inpatient to the hospitalist service.  2.  CTA of the chest in the emergency department showed diffuse bilateral pulmonary emboli involving the distal main  pulmonary arteries with mild right ventricular heart strain.  Case was discussed with cardiology in the emergency department, they will evaluate for need for possible EKOS procedure.  Patient will be placed on a heparin drip.  3.  Venous Doppler ultrasound of bilateral lower extremities.  4.  Transthoracic echocardiogram.  5.  CTA also shows scattered groundglass opacities which may represent multifocal pneumonia and/or ischemic changes of the parenchyma.  Do not feel patient has pneumonia at this time. She has normal white blood cell count, denies any significant cough or fever.  We will continue to monitor symptoms closely.  DuoNeb breathing treatments added as needed and incentive spirometry.  6.  Further work-up for anemia.  Occult blood stool checked in the ER which was found to be negative.  Will check iron studies, will likely benefit from IV Venofer.  7.  Work-up ongoing.  8.  Labs in a.m.  We will check modifiable risk factors with hemoglobin A1c and lipid panel.  Patient may benefit from further stroke prevention therapy with aspirin and atorvastatin at time of discharge.      Code Status: Full.  In the event the patient is unable to speak for herself she designates her sister as her healthcare surrogate.     I discussed the patients findings and my recommendations with the patient, sister at bedside, and Dr. Cade Nelson.    Estimated length of stay: To be determined.    Electronically signed by HELADIO Rg, 12/20/2021, 17:36 CST.

## 2021-12-20 NOTE — ED PROVIDER NOTES
"Subjective   51-year-old female presents to the emergency department with complaint of shortness of breath.  Onset of symptoms 5 to 6 days ago.  She has had cough, shortness of breath and wheezing.  No rhinorrhea or sore throat, no change in smell or taste sensation.  Shortness of breath is worse with exertion, worse with lying flat. Improves with rest. Denies lower extremity swelling. Contacted her primary care doctor today who recommend she come to the ER for evaluation.  She denies recent travel, no unilateral leg pain or swelling, no history of DVT or PE.  Patient has had previous \"mini stroke\" and several additional complications following her [employer] Covid vaccine in April 2021.      History provided by:  Patient      Review of Systems   All other systems reviewed and are negative.      Past Medical History:   Diagnosis Date   • Anxiety    • GERD (gastroesophageal reflux disease)    • Injury of back    • Neuropathy    • Spondylisthesis        Allergies   Allergen Reactions   • Erythromycin Shortness Of Breath   • Penicillins Hives   • Ampicillin Hives       Past Surgical History:   Procedure Laterality Date   • BREAST SURGERY     • CHOLECYSTECTOMY     • DILATATION AND CURETTAGE     • GASTRIC SLEEVE LAPAROSCOPIC     • INSERT / REPLACE / REMOVE PACEMAKER     • PACEMAKER IMPLANTATION         History reviewed. No pertinent family history.    Social History     Socioeconomic History   • Marital status:    Tobacco Use   • Smoking status: Current Every Day Smoker     Packs/day: 1.00     Types: Cigarettes   Substance and Sexual Activity   • Alcohol use: No   • Drug use: No   • Sexual activity: Defer           Objective   Physical Exam  Vitals and nursing note reviewed.   Constitutional:       General: She is not in acute distress.     Appearance: Normal appearance. She is normal weight.   HENT:      Head: Normocephalic and atraumatic.      Nose: Nose normal.      Mouth/Throat:      Mouth: Mucous " membranes are moist.      Pharynx: Oropharynx is clear. No oropharyngeal exudate or posterior oropharyngeal erythema.   Eyes:      Extraocular Movements: Extraocular movements intact.      Conjunctiva/sclera: Conjunctivae normal.      Pupils: Pupils are equal, round, and reactive to light.   Cardiovascular:      Rate and Rhythm: Normal rate and regular rhythm.      Pulses: Normal pulses.      Heart sounds: Normal heart sounds.   Pulmonary:      Effort: Pulmonary effort is normal.      Breath sounds: Wheezing present. No rhonchi or rales.   Abdominal:      General: Abdomen is flat. Bowel sounds are normal. There is no distension.      Palpations: Abdomen is soft.      Tenderness: There is no abdominal tenderness.   Musculoskeletal:         General: No tenderness. Normal range of motion.      Cervical back: Normal range of motion and neck supple. No rigidity. No muscular tenderness.      Right lower leg: No edema.      Left lower leg: No edema.   Skin:     General: Skin is warm and dry.      Capillary Refill: Capillary refill takes less than 2 seconds.      Findings: No rash.   Neurological:      General: No focal deficit present.      Mental Status: She is alert and oriented to person, place, and time. Mental status is at baseline.      Cranial Nerves: No cranial nerve deficit.      Sensory: No sensory deficit.      Motor: No weakness.   Psychiatric:         Mood and Affect: Mood normal.         Behavior: Behavior normal.         Thought Content: Thought content normal.         Procedures         Lab Results (last 24 hours)     Procedure Component Value Units Date/Time    CBC & Differential [[id number]]  (Abnormal) Collected: 12/20/21 1320    Specimen: Blood Updated: 12/20/21 1402    Narrative:      The following orders were created for panel order CBC & Differential.  Procedure                               Abnormality         Status                     ---------                               -----------          ------                     CBC Auto Differential[[id number]]        Abnormal            Final result                 Please view results for these tests on the individual orders.    COVID-19,[facility]-HOU[facility],Nasal Swab No Transport Media 3-4 HR TAT - Swab, Nasal Cavity [[id number]]  (Normal) Collected: 12/20/21 1320    Specimen: Swab from Nasal Cavity Updated: 12/20/21 1420     COVID19 Not Detected    Narrative:      Fact sheet for providers: https://www.fda.gov/media/272444/download     Fact sheet for patients: https://www.fda.gov/media/565945/download    Test performed by PCR.    Consider negative results in combination with clinical observations, patient history, and epidemiological information.    CBC Auto Differential [[id number]]  (Abnormal) Collected: 12/20/21 1320    Specimen: Blood Updated: 12/20/21 1402     WBC 7.16 10*3/mm3      RBC 3.59 10*6/mm3      Hemoglobin 7.4 g/dL      Hematocrit 26.3 %      MCV 73.3 fL      MCH 20.6 pg      MCHC 28.1 g/dL      RDW 23.2 %      RDW-SD 57.7 fl      Platelets 291 10*3/mm3     Narrative:      ckd    Manual Differential [[id number]]  (Abnormal) Collected: 12/20/21 1320    Specimen: Blood Updated: 12/20/21 1402     Neutrophil % 64.6 %      Lymphocyte % 27.3 %      Monocyte % 4.0 %      Eosinophil % 4.0 %      Neutrophils Absolute 4.63 10*3/mm3      Lymphocytes Absolute 1.95 10*3/mm3      Monocytes Absolute 0.29 10*3/mm3      Eosinophils Absolute 0.29 10*3/mm3      nRBC 2.0 /100 WBC      Anisocytosis Mod/2+     Hypochromia Mod/2+     Microcytes Slight/1+     Poikilocytes Slight/1+     Polychromasia Large/3+     WBC Morphology Normal     Giant Platelets Mod/2+    Comprehensive Metabolic Panel [[id number]]  (Abnormal) Collected: 12/20/21 1321    Specimen: Blood Updated: 12/20/21 1402     Glucose 87 mg/dL      BUN 9 mg/dL      Creatinine 0.67 mg/dL      Sodium 140 mmol/L      Potassium 3.8 mmol/L      Comment: Slight hemolysis detected by analyzer. Results may be affected.         Chloride 107 mmol/L      CO2 22.0 mmol/L      Calcium 8.6 mg/dL      Total Protein 6.3 g/dL      Albumin 3.20 g/dL      ALT (SGPT) 9 U/L      AST (SGOT) 14 U/L      Alkaline Phosphatase 76 U/L      Total Bilirubin 0.2 mg/dL      eGFR Non African Amer 93 mL/min/1.73      Globulin 3.1 gm/dL      A/G Ratio 1.0 g/dL      BUN/Creatinine Ratio 13.4     Anion Gap 11.0 mmol/L     Narrative:      GFR Normal >60  Chronic Kidney Disease <60  Kidney Failure <15      Protime-INR [[id number]]  (Normal) Collected: 12/20/21 1321    Specimen: Blood Updated: 12/20/21 1356     Protime 12.8 Seconds      INR 1.00    Magnesium [[id number]]  (Normal) Collected: 12/20/21 1321    Specimen: Blood Updated: 12/20/21 1357     Magnesium 2.0 mg/dL     BNP [[id number]]  (Abnormal) Collected: 12/20/21 1321    Specimen: Blood Updated: 12/20/21 1359     proBNP 5,629.0 pg/mL     Narrative:      Among patients with dyspnea, NT-proBNP is highly sensitive for the detection of acute congestive heart failure. In addition NT-proBNP of <300 pg/ml effectively rules out acute congestive heart failure with 99% negative predictive value.    Results may be falsely decreased if patient taking Biotin.      D-dimer, Quantitative [[id number]]  (Abnormal) Collected: 12/20/21 1321    Specimen: Blood Updated: 12/20/21 1356     D-Dimer, Quantitative 1.22 mg/L (FEU)     Narrative:      Reference Range is 0-0.50 mg/L FEU. However, results <0.50 mg/L FEU tends to rule out DVT or PE. Results >0.50 mg/L FEU are not useful in predicting absence or presence of DVT or PE.      Troponin [[id number]]  (Normal) Collected: 12/20/21 1321    Specimen: Blood Updated: 12/20/21 1358     Troponin T <0.010 ng/mL     Narrative:      Troponin T Reference Range:  <= 0.03 ng/mL-   Negative for AMI  >0.03 ng/mL-     Abnormal for myocardial necrosis.  Clinicians would have to utilize clinical acumen, EKG, Troponin and serial changes to determine if it is an Acute Myocardial Infarction or myocardial  injury due to an underlying chronic condition.       Results may be falsely decreased if patient taking Biotin.      aPTT [[id number]] Collected: 12/20/21 1321    Specimen: Blood Updated: 12/20/21 1617      CT Angiogram Chest    Result Date: 12/20/2021  CT ANGIOGRAM CHEST- 12/20/2021 3:14 PM CST  HISTORY: dyspnea, elevated dimer  COMPARISON: 2/27/2017  DOSE LENGTH PRODUCT: 532 mGy cm. Automated exposure control was also utilized to decrease patient radiation dose.  TECHNIQUE: Axial images the chest are obtained following IV contrast. 2-D and maximal intensity projection images are reconstructed and reviewed.  FINDINGS:  There are scattered bilateral pulmonary emboli involving the distal main pulmonary arteries with emboli extending into all 5 segmental pulmonary artery branches. Thrombus burden slightly greater on the right. RV to LV ratio slightly elevated 1.41 suggesting mild right ventricular heart strain. There is mild cardiomegaly with a left subclavian cardiac pacer device. Trace pericardial fluid. No pleural effusions. No thoracic aortic aneurysm or dissection identified. No pathologic intrathoracic or axillary lymphadenopathy. Fat necrosis suspected within the superior left chest wall.  Postoperative changes of the stomach. Small hiatal hernia. Cholecystectomy clips.  Calcified granuloma within the left upper lobe. Scattered Groundglass opacities suspicious for multifocal pneumonia versus ischemia. No pneumothorax. No discrete endobronchial lesion.  Mild degenerative change of the thoracic spine      1. Diffuse bilateral pulmonary emboli involving the distal main pulmonary arteries extending into the subsegmental branches. Mild right ventricular heart strain with an RV to LV ratio measuring 1.41. Findings called to Dr. Blue in the ER at 3:30 PM on 12/20/2021 2. Scattered groundglass opacities may represent multifocal pneumonia and/or ischemic changes of the parenchyma. This report was finalized on  12/20/2021 15:38 by Dr. Tati Troy MD.    ED Course  ED Course as of 12/20/21 1625   Mon Dec 20, 2021   1616 51-year-old female with history of GERD, anxiety, TIA who presents to the emergency department with complaint of shortness of breath, cough and wheezing.  Covid was not detected.  BNP was elevated and D-dimer is elevated, troponin was normal.  CTA ordered, shows diffuse bilateral pulmonary emboli involving the distal main  pulmonary arteries extending into the subsegmental branches with evidence of right heart strain, RV to LV ratio 1.4.  Patient also has hemoglobin of 7.4 and hematocrit 26.3. Her most recent records show a hemoglobin of 11.32 years ago, but family states the patient has iron deficiency anemia.  Platelets are normal so I doubt patient has a vaccine induced thrombotic thrombocytopenia, but she may have a vaccine related PE, especially since she received the [employer] vaccine.    CT also showed some scattered bibasilar groundglass opacities that could represent multifocal pneumonia versus ischemic changes of the parenchyma. Given the large burden bilateral PE, no fever, normal white count I favor parenchyma ischemic changes over infection. Will not give antibiotics at this point.    I discussed the case with cardiology, Dr. Tovar. They will consult on the patient to see if she would be a candidate for EKOS but recommend admission to the hospitalist given the patient's anemia. We'll send a type and screen. At this point she is hemodynamically stable and does not yet meet criteria for blood transfusion. Occult blood was negative.  We'll start the patient on a heparin drip given her anemia. [AW]      ED Course User Index  [AW] Robert Blue MD                                                 MDM  Number of Diagnoses or Management Options  Dyspnea, unspecified type: new and requires workup  Pulmonary embolism, bilateral (HCC): new and requires workup     Amount and/or  Complexity of Data Reviewed  Clinical lab tests: reviewed  Tests in the radiology section of CPT®: reviewed  Tests in the medicine section of CPT®: reviewed  Decide to obtain previous medical records or to obtain history from someone other than the patient: yes    Risk of Complications, Morbidity, and/or Mortality  Presenting problems: high  Diagnostic procedures: high  Management options: high    Patient Progress  Patient progress: stable      Final diagnoses:   Pulmonary embolism, bilateral (HCC)   Dyspnea, unspecified type       ED Disposition  ED Disposition     ED Disposition Condition Comment    Decision to Admit  Level of Care: Telemetry [5]   Diagnosis: Pulmonary embolism, bilateral (HCC) [[id number]]   Admitting Physician: CORRINA TURCIOS [[id number]]   Attending Physician: CORRINA TURCIOS [[id number]]   Isolate for COVID?: No [0]   Certification: I Certify That Inpatient Hospital Services Are Medically Necessary For Greater Than 2 Midnights            No follow-up provider specified.       Medication List      No changes were made to your prescriptions during this visit.          Robert Blue MD  12/20/21 9808

## 2021-12-21 ENCOUNTER — APPOINTMENT (OUTPATIENT)
Dept: CARDIOLOGY | Facility: HOSPITAL | Age: 51
End: 2021-12-21

## 2021-12-21 ENCOUNTER — APPOINTMENT (OUTPATIENT)
Dept: ULTRASOUND IMAGING | Facility: HOSPITAL | Age: 51
End: 2021-12-21

## 2021-12-21 PROBLEM — I82.402 ACUTE DEEP VEIN THROMBOSIS (DVT) OF LEFT LOWER EXTREMITY (HCC): Status: ACTIVE | Noted: 2021-12-21

## 2021-12-21 PROBLEM — D50.9 IRON DEFICIENCY ANEMIA: Status: ACTIVE | Noted: 2021-12-21

## 2021-12-21 LAB
ANION GAP SERPL CALCULATED.3IONS-SCNC: 12 MMOL/L (ref 5–15)
APTT PPP: 59.1 SECONDS (ref 24.1–35)
APTT PPP: 64 SECONDS (ref 24.1–35)
APTT PPP: 92.5 SECONDS (ref 24.1–35)
BH CV ECHO MEAS - AO MAX PG (FULL): 0.86 MMHG
BH CV ECHO MEAS - AO MAX PG: 5.1 MMHG
BH CV ECHO MEAS - AO MEAN PG (FULL): 1 MMHG
BH CV ECHO MEAS - AO MEAN PG: 3 MMHG
BH CV ECHO MEAS - AO ROOT AREA (BSA CORRECTED): 1.6
BH CV ECHO MEAS - AO ROOT AREA: 9.1 CM^2
BH CV ECHO MEAS - AO ROOT DIAM: 3.4 CM
BH CV ECHO MEAS - AO V2 MAX: 113 CM/SEC
BH CV ECHO MEAS - AO V2 MEAN: 75.4 CM/SEC
BH CV ECHO MEAS - AO V2 VTI: 21 CM
BH CV ECHO MEAS - AVA(I,A): 3.9 CM^2
BH CV ECHO MEAS - AVA(I,D): 3.9 CM^2
BH CV ECHO MEAS - AVA(V,A): 4.1 CM^2
BH CV ECHO MEAS - AVA(V,D): 4.1 CM^2
BH CV ECHO MEAS - BSA(HAYCOCK): 2.3 M^2
BH CV ECHO MEAS - BSA: 2.1 M^2
BH CV ECHO MEAS - BZI_BMI: 44.1 KILOGRAMS/M^2
BH CV ECHO MEAS - BZI_METRIC_HEIGHT: 160 CM
BH CV ECHO MEAS - BZI_METRIC_WEIGHT: 112.9 KG
BH CV ECHO MEAS - EDV(CUBED): 105.2 ML
BH CV ECHO MEAS - EDV(MOD-SP4): 114 ML
BH CV ECHO MEAS - EDV(TEICH): 103.4 ML
BH CV ECHO MEAS - EF(CUBED): 61.6 %
BH CV ECHO MEAS - EF(MOD-SP4): 58 %
BH CV ECHO MEAS - EF(TEICH): 53.1 %
BH CV ECHO MEAS - ESV(CUBED): 40.4 ML
BH CV ECHO MEAS - ESV(MOD-SP4): 47.9 ML
BH CV ECHO MEAS - ESV(TEICH): 48.5 ML
BH CV ECHO MEAS - FS: 27.3 %
BH CV ECHO MEAS - IVS/LVPW: 1.2
BH CV ECHO MEAS - IVSD: 1.1 CM
BH CV ECHO MEAS - LA DIMENSION: 3.2 CM
BH CV ECHO MEAS - LA/AO: 0.94
BH CV ECHO MEAS - LAT PEAK E' VEL: 6.9 CM/SEC
BH CV ECHO MEAS - LV DIASTOLIC VOL/BSA (35-75): 53.7 ML/M^2
BH CV ECHO MEAS - LV MASS(C)D: 160.1 GRAMS
BH CV ECHO MEAS - LV MASS(C)DI: 75.4 GRAMS/M^2
BH CV ECHO MEAS - LV MAX PG: 4.2 MMHG
BH CV ECHO MEAS - LV MEAN PG: 2 MMHG
BH CV ECHO MEAS - LV SYSTOLIC VOL/BSA (12-30): 22.6 ML/M^2
BH CV ECHO MEAS - LV V1 MAX: 103 CM/SEC
BH CV ECHO MEAS - LV V1 MEAN: 71.9 CM/SEC
BH CV ECHO MEAS - LV V1 VTI: 18.2 CM
BH CV ECHO MEAS - LVIDD: 4.7 CM
BH CV ECHO MEAS - LVIDS: 3.4 CM
BH CV ECHO MEAS - LVLD AP4: 8.4 CM
BH CV ECHO MEAS - LVLS AP4: 6.7 CM
BH CV ECHO MEAS - LVOT AREA (M): 4.5 CM^2
BH CV ECHO MEAS - LVOT AREA: 4.5 CM^2
BH CV ECHO MEAS - LVOT DIAM: 2.4 CM
BH CV ECHO MEAS - LVPWD: 0.9 CM
BH CV ECHO MEAS - MED PEAK E' VEL: 5.98 CM/SEC
BH CV ECHO MEAS - MV A MAX VEL: 62.6 CM/SEC
BH CV ECHO MEAS - MV DEC TIME: 0.23 SEC
BH CV ECHO MEAS - MV E MAX VEL: 54.3 CM/SEC
BH CV ECHO MEAS - MV E/A: 0.87
BH CV ECHO MEAS - PI END-D VEL: 100 CM/SEC
BH CV ECHO MEAS - RAP SYSTOLE: 10 MMHG
BH CV ECHO MEAS - RVSP: 54.9 MMHG
BH CV ECHO MEAS - SI(AO): 89.8 ML/M^2
BH CV ECHO MEAS - SI(CUBED): 30.5 ML/M^2
BH CV ECHO MEAS - SI(LVOT): 38.8 ML/M^2
BH CV ECHO MEAS - SI(MOD-SP4): 31.1 ML/M^2
BH CV ECHO MEAS - SI(TEICH): 25.9 ML/M^2
BH CV ECHO MEAS - SV(AO): 190.7 ML
BH CV ECHO MEAS - SV(CUBED): 64.8 ML
BH CV ECHO MEAS - SV(LVOT): 82.3 ML
BH CV ECHO MEAS - SV(MOD-SP4): 66.1 ML
BH CV ECHO MEAS - SV(TEICH): 54.9 ML
BH CV ECHO MEAS - TR MAX VEL: 335 CM/SEC
BH CV ECHO MEASUREMENTS AVERAGE E/E' RATIO: 8.43
BUN SERPL-MCNC: 8 MG/DL (ref 6–20)
BUN/CREAT SERPL: 11.6 (ref 7–25)
CALCIUM SPEC-SCNC: 8.4 MG/DL (ref 8.6–10.5)
CHLORIDE SERPL-SCNC: 108 MMOL/L (ref 98–107)
CHOLEST SERPL-MCNC: 92 MG/DL (ref 0–200)
CO2 SERPL-SCNC: 23 MMOL/L (ref 22–29)
CREAT SERPL-MCNC: 0.69 MG/DL (ref 0.57–1)
DEPRECATED RDW RBC AUTO: 57.8 FL (ref 37–54)
ERYTHROCYTE [DISTWIDTH] IN BLOOD BY AUTOMATED COUNT: 22.9 % (ref 12.3–15.4)
GFR SERPL CREATININE-BSD FRML MDRD: 90 ML/MIN/1.73
GLUCOSE SERPL-MCNC: 90 MG/DL (ref 65–99)
HCT VFR BLD AUTO: 24.2 % (ref 34–46.6)
HCT VFR BLD AUTO: 29 % (ref 34–46.6)
HDLC SERPL-MCNC: 41 MG/DL (ref 40–60)
HGB BLD-MCNC: 6.8 G/DL (ref 12–15.9)
HGB BLD-MCNC: 8.5 G/DL (ref 12–15.9)
LDLC SERPL CALC-MCNC: 34 MG/DL (ref 0–100)
LDLC/HDLC SERPL: 0.83 {RATIO}
LEFT ATRIUM VOLUME INDEX: 19.4 ML/M2
LEFT ATRIUM VOLUME: 41.7 CM3
MAXIMAL PREDICTED HEART RATE: 170 BPM
MCH RBC QN AUTO: 20.5 PG (ref 26.6–33)
MCHC RBC AUTO-ENTMCNC: 28.1 G/DL (ref 31.5–35.7)
MCV RBC AUTO: 72.9 FL (ref 79–97)
PLATELET # BLD AUTO: 273 10*3/MM3 (ref 140–450)
POTASSIUM SERPL-SCNC: 3.4 MMOL/L (ref 3.5–5.2)
QT INTERVAL: 418 MS
QTC INTERVAL: 485 MS
RBC # BLD AUTO: 3.32 10*6/MM3 (ref 3.77–5.28)
SODIUM SERPL-SCNC: 143 MMOL/L (ref 136–145)
STRESS TARGET HR: 145 BPM
TRIGL SERPL-MCNC: 85 MG/DL (ref 0–150)
VLDLC SERPL-MCNC: 17 MG/DL (ref 5–40)
WBC NRBC COR # BLD: 5.33 10*3/MM3 (ref 3.4–10.8)

## 2021-12-21 PROCEDURE — 36430 TRANSFUSION BLD/BLD COMPNT: CPT

## 2021-12-21 PROCEDURE — P9016 RBC LEUKOCYTES REDUCED: HCPCS

## 2021-12-21 PROCEDURE — 25010000002 PERFLUTREN 6.52 MG/ML SUSPENSION: Performed by: INTERNAL MEDICINE

## 2021-12-21 PROCEDURE — 85027 COMPLETE CBC AUTOMATED: CPT | Performed by: INTERNAL MEDICINE

## 2021-12-21 PROCEDURE — 93970 EXTREMITY STUDY: CPT

## 2021-12-21 PROCEDURE — 99222 1ST HOSP IP/OBS MODERATE 55: CPT | Performed by: NURSE PRACTITIONER

## 2021-12-21 PROCEDURE — 25010000002 HEPARIN (PORCINE) PER 1000 UNITS: Performed by: INTERNAL MEDICINE

## 2021-12-21 PROCEDURE — 93970 EXTREMITY STUDY: CPT | Performed by: SURGERY

## 2021-12-21 PROCEDURE — 93306 TTE W/DOPPLER COMPLETE: CPT

## 2021-12-21 PROCEDURE — 85730 THROMBOPLASTIN TIME PARTIAL: CPT | Performed by: EMERGENCY MEDICINE

## 2021-12-21 PROCEDURE — 85018 HEMOGLOBIN: CPT | Performed by: INTERNAL MEDICINE

## 2021-12-21 PROCEDURE — 80061 LIPID PANEL: CPT | Performed by: NURSE PRACTITIONER

## 2021-12-21 PROCEDURE — 86900 BLOOD TYPING SEROLOGIC ABO: CPT

## 2021-12-21 PROCEDURE — 80048 BASIC METABOLIC PNL TOTAL CA: CPT | Performed by: INTERNAL MEDICINE

## 2021-12-21 PROCEDURE — 93306 TTE W/DOPPLER COMPLETE: CPT | Performed by: INTERNAL MEDICINE

## 2021-12-21 PROCEDURE — 25010000002 IRON SUCROSE PER 1 MG: Performed by: NURSE PRACTITIONER

## 2021-12-21 PROCEDURE — 85014 HEMATOCRIT: CPT | Performed by: INTERNAL MEDICINE

## 2021-12-21 PROCEDURE — 85730 THROMBOPLASTIN TIME PARTIAL: CPT | Performed by: INTERNAL MEDICINE

## 2021-12-21 RX ORDER — PANTOPRAZOLE SODIUM 40 MG/1
40 TABLET, DELAYED RELEASE ORAL 2 TIMES DAILY
COMMUNITY

## 2021-12-21 RX ORDER — PANTOPRAZOLE SODIUM 40 MG/1
40 TABLET, DELAYED RELEASE ORAL
Status: DISCONTINUED | OUTPATIENT
Start: 2021-12-21 | End: 2021-12-22 | Stop reason: HOSPADM

## 2021-12-21 RX ORDER — POTASSIUM CHLORIDE 750 MG/1
40 CAPSULE, EXTENDED RELEASE ORAL ONCE
Status: COMPLETED | OUTPATIENT
Start: 2021-12-21 | End: 2021-12-21

## 2021-12-21 RX ORDER — PANTOPRAZOLE SODIUM 40 MG/1
40 TABLET, DELAYED RELEASE ORAL EVERY MORNING
Refills: 0 | Status: DISCONTINUED | OUTPATIENT
Start: 2021-12-22 | End: 2021-12-21

## 2021-12-21 RX ORDER — TOPIRAMATE 25 MG/1
25 TABLET ORAL DAILY
Status: DISCONTINUED | OUTPATIENT
Start: 2021-12-21 | End: 2021-12-22 | Stop reason: HOSPADM

## 2021-12-21 RX ADMIN — GABAPENTIN 300 MG: 300 CAPSULE ORAL at 15:23

## 2021-12-21 RX ADMIN — GABAPENTIN 300 MG: 300 CAPSULE ORAL at 01:20

## 2021-12-21 RX ADMIN — ROPINIROLE HYDROCHLORIDE 1 MG: 1 TABLET, FILM COATED ORAL at 20:45

## 2021-12-21 RX ADMIN — OXYCODONE AND ACETAMINOPHEN 1 TABLET: 7.5; 325 TABLET ORAL at 01:21

## 2021-12-21 RX ADMIN — DULOXETINE HYDROCHLORIDE 90 MG: 30 CAPSULE, DELAYED RELEASE ORAL at 20:45

## 2021-12-21 RX ADMIN — OXYCODONE AND ACETAMINOPHEN 1 TABLET: 7.5; 325 TABLET ORAL at 20:45

## 2021-12-21 RX ADMIN — ROPINIROLE HYDROCHLORIDE 1 MG: 1 TABLET, FILM COATED ORAL at 10:19

## 2021-12-21 RX ADMIN — HEPARIN SODIUM 4280 UNITS: 1000 INJECTION, SOLUTION INTRAVENOUS; SUBCUTANEOUS at 07:33

## 2021-12-21 RX ADMIN — POTASSIUM CHLORIDE 40 MEQ: 10 CAPSULE, COATED, EXTENDED RELEASE ORAL at 20:45

## 2021-12-21 RX ADMIN — GABAPENTIN 300 MG: 300 CAPSULE ORAL at 10:19

## 2021-12-21 RX ADMIN — DULOXETINE HYDROCHLORIDE 90 MG: 30 CAPSULE, DELAYED RELEASE ORAL at 01:20

## 2021-12-21 RX ADMIN — TOPIRAMATE 25 MG: 25 TABLET, FILM COATED ORAL at 14:21

## 2021-12-21 RX ADMIN — GABAPENTIN 300 MG: 300 CAPSULE ORAL at 20:45

## 2021-12-21 RX ADMIN — IRON SUCROSE 200 MG: 20 INJECTION, SOLUTION INTRAVENOUS at 20:47

## 2021-12-21 RX ADMIN — OXYCODONE AND ACETAMINOPHEN 1 TABLET: 7.5; 325 TABLET ORAL at 11:53

## 2021-12-21 RX ADMIN — SODIUM CHLORIDE 100 ML/HR: 9 INJECTION, SOLUTION INTRAVENOUS at 15:21

## 2021-12-21 RX ADMIN — HEPARIN SODIUM 4280 UNITS: 1000 INJECTION, SOLUTION INTRAVENOUS; SUBCUTANEOUS at 15:18

## 2021-12-21 RX ADMIN — PERFLUTREN 9.78 MG: 6.52 INJECTION, SUSPENSION INTRAVENOUS at 09:56

## 2021-12-21 RX ADMIN — PANTOPRAZOLE SODIUM 40 MG: 40 TABLET, DELAYED RELEASE ORAL at 14:21

## 2021-12-21 RX ADMIN — ROPINIROLE HYDROCHLORIDE 1 MG: 1 TABLET, FILM COATED ORAL at 01:20

## 2021-12-21 NOTE — PAYOR COMM NOTE
"12/21/21 [facility] [phone]  [phone]      ER ADMISSION ON 12/20/21    FAXING CLINICAL FOR INPATIENT REVIEW.    AUTH [id number]      Yecenia Carlson (51 y.o. Female)             Date of Birth Social Security Number Address Home Phone MRN    1970  [address] [address] [phone] [id number]    Judaism Marital Status             Other        Admission Date Admission Type Admitting Provider Attending Provider Department, Room/Bed    12/20/21 Emergency Cade Nelson MD Thompson, Benjamin H, MD [facility] 4B, 444/1    Discharge Date Discharge Disposition Discharge Destination                         Attending Provider: Cade Nelson MD    Allergies: Erythromycin, Penicillins, Ampicillin    Isolation: None   Infection: None   Code Status: CPR   Advance Care Planning Activity    Ht: 160 cm (63\")   Wt: 113 kg (249 lb)    Admission Cmt: None   Principal Problem: None                Active Insurance as of 12/20/2021     Primary Coverage     Payor Plan Insurance Group Employer/Plan Group    [facility] MEDICARE REPLACEMENT [facility] DUAL COMPLETE MEDICARE REPLACEMENT [id number]     Payor Plan Address Payor Plan Phone Number Payor Plan Fax Number Effective Dates    [address] [phone]  1/1/2021 - None Entered    [facility] [address]       Subscriber Name Subscriber Birth Date Member ID       YECENIA CARLSON 1970 [id number]           Secondary Coverage     Payor Plan Insurance Group Employer/Plan Group    [place]      Payor Plan Address Payor Plan Phone Number Payor Plan Fax Number Effective Dates    [address] [phone]  2/27/2017 - None Entered    [facility] [id number]       Subscriber Name Subscriber Birth Date Member ID       YECENIA CARLSON 1970 [id number]                 Emergency Contacts      (Rel.) Home Phone Work Phone " Mobile Phone    MaxCamMari (Sister) -- -- [phone]               Encounter Date/Time: 2021 [facility]   Hospital Account: [id number]    MRN: [id number]   Patient:  Yecenia Carlson   Contact Serial #: [id number]   SSN:          ENCOUNTER             Patient Class: Inpatient   Unit: [address] Service: Medicine     Bed: 444/1   Admitting Provider: Cade Nelson MD   Referring Physician: Provider, No Known   Attending Provider: Cade Nelson MD   Adm Diagnosis: Pulmonary embolism, bila*               PATIENT          Name: Yecenia Carlson : 1970 (51 yrs)   Address: [address] [place] [address]* Sex: Female   City: [place] [address]   County: [place]   Marital Status:  Ethnicity: NOT                                                                              Race: WHITE   Primary Care Provider: Sushil Nash MD Patients Phone: Home Phone: [phone]     Mobile Phone: [phone]   EMERGENCY CONTACT   Contact Name Legal Guardian? Relationship to Patient Home Phone Work Phone   1. Mari Santana  2. *No Contact Specified*      Sister                   GUARANTOR            Guarantor: Yecenia Carlson     : 1970   Address: [address] [place] [address]* Sex: Female     [place] [address]     Relation to Patient: Self       Home Phone: [phone]   Guarantor ID: [id number]       Work Phone:     GUARANTOR EMPLOYER   Employer:           Status: DISABLED   COVERAGE          PRIMARY INSURANCE   Payor: [facility] MEDICARE REPLACEMENT Plan: [facility] DUAL COMPLETE MEDICARE REPLACEMENT   Group Number: [id number] Insurance Type: INDEMNITY   Subscriber Name: YECENIA CARLSON Subscriber : 1970   Subscriber ID: [id number] Coverage Address: [address] [address]   Pat. Rel. to Subscriber: Self Coverage Phone: [phone]   SECONDARY INSURANCE   [place] Group  "Number:   Insurance Type: INDEMNITY   Subscriber Name: MONIK CARLSON Subscriber : 1970   Subscriber ID: [id number] Coverage Address: [facility] [address] [place] [address]   Pat. Rel. to Subscriber: SELF Coverage Phone: [phone]      Contact Serial # ([id number])  `       2021    Chart ID ([id number])             Department Encounter #   2021 12:31 PM  PAD 4B [id number]     Robert Blue MD   Physician   Emergency Medicine   ED Provider Notes       Signed   Date of Service:  21 130   Creation Time:  21              Signed        Expand AllCollapse All          Show:Clear all  [x]Manual[x]Template[]Copied    Added by:  [x]Robert Blue MD      []Hover for details       Subjective      51-year-old female presents to the emergency department with complaint of shortness of breath.  Onset of symptoms 5 to 6 days ago.  She has had cough, shortness of breath and wheezing.  No rhinorrhea or sore throat, no change in smell or taste sensation.  Shortness of breath is worse with exertion, worse with lying flat. Improves with rest. Denies lower extremity swelling. Contacted her primary care doctor today who recommend she come to the ER for evaluation.  She denies recent travel, no unilateral leg pain or swelling, no history of DVT or PE.  Patient has had previous \"mini stroke\" and several additional complications following her [employer] Covid vaccine in 2021.        History provided by:  Patient        Review of Systems   All other systems reviewed and are negative.                 History        Past Medical History:   Diagnosis Date   • Anxiety     • GERD (gastroesophageal reflux disease)     • Injury of back     • Neuropathy     • Spondylisthesis           Objective      Physical Exam  Vitals and nursing note reviewed.   Constitutional:       General: She is not in acute distress.     Appearance: Normal appearance. She is " normal weight.   HENT:      Head: Normocephalic and atraumatic.      Nose: Nose normal.      Mouth/Throat:      Mouth: Mucous membranes are moist.      Pharynx: Oropharynx is clear. No oropharyngeal exudate or posterior oropharyngeal erythema.   Eyes:      Extraocular Movements: Extraocular movements intact.      Conjunctiva/sclera: Conjunctivae normal.      Pupils: Pupils are equal, round, and reactive to light.   Cardiovascular:      Rate and Rhythm: Normal rate and regular rhythm.      Pulses: Normal pulses.      Heart sounds: Normal heart sounds.   Pulmonary:      Effort: Pulmonary effort is normal.      Breath sounds: Wheezing present. No rhonchi or rales.   Abdominal:      General: Abdomen is flat. Bowel sounds are normal. There is no distension.      Palpations: Abdomen is soft.      Tenderness: There is no abdominal tenderness.   Musculoskeletal:         General: No tenderness. Normal range of motion.      Cervical back: Normal range of motion and neck supple. No rigidity. No muscular tenderness.      Right lower leg: No edema.      Left lower leg: No edema.   Skin:     General: Skin is warm and dry.      Capillary Refill: Capillary refill takes less than 2 seconds.      Findings: No rash.   Neurological:      General: No focal deficit present.      Mental Status: She is alert and oriented to person, place, and time. Mental status is at baseline.      Cranial Nerves: No cranial nerve deficit.      Sensory: No sensory deficit.      Motor: No weakness.   Psychiatric:         Mood and Affect: Mood normal.         Behavior: Behavior normal.         Thought Content: Thought content normal.            Procedures                       Lab Results (last 24 hours)      Procedure Component Value Units Date/Time     CBC & Differential [[id number]]  (Abnormal) Collected: 12/20/21 1320     Specimen: Blood Updated: 12/20/21 1402     Narrative:       The following orders were created for panel order CBC &  Differential.  Procedure                               Abnormality         Status                     ---------                               -----------         ------                     CBC Auto Differential[[id number]]        Abnormal            Final result                  Please view results for these tests on the individual orders.     COVID-19,[facility] IN-HOUSE,Nasal Swab No Transport Media 3-4 HR TAT - Swab, Nasal Cavity [[id number]]  (Normal) Collected: 12/20/21 1320     Specimen: Swab from Nasal Cavity Updated: 12/20/21 1420       COVID19 Not Detected     Narrative:       Fact sheet for providers: https://www.fda.gov/media/128353/download      Fact sheet for patients: https://www.fda.gov/media/244677/download     Test performed by PCR.     Consider negative results in combination with clinical observations, patient history, and epidemiological information.     CBC Auto Differential [[id number]]  (Abnormal) Collected: 12/20/21 1320     Specimen: Blood Updated: 12/20/21 1402       WBC 7.16 10*3/mm3         RBC 3.59 10*6/mm3         Hemoglobin 7.4 g/dL         Hematocrit 26.3 %         MCV 73.3 fL         MCH 20.6 pg         MCHC 28.1 g/dL         RDW 23.2 %         RDW-SD 57.7 fl         Platelets 291 10*3/mm3       Narrative:       ckd     Manual Differential [[id number]]  (Abnormal) Collected: 12/20/21 1320     Specimen: Blood Updated: 12/20/21 1402       Neutrophil % 64.6 %         Lymphocyte % 27.3 %         Monocyte % 4.0 %         Eosinophil % 4.0 %         Neutrophils Absolute 4.63 10*3/mm3         Lymphocytes Absolute 1.95 10*3/mm3         Monocytes Absolute 0.29 10*3/mm3         Eosinophils Absolute 0.29 10*3/mm3         nRBC 2.0 /100 WBC         Anisocytosis Mod/2+       Hypochromia Mod/2+       Microcytes Slight/1+       Poikilocytes Slight/1+       Polychromasia Large/3+       WBC Morphology Normal       Giant Platelets Mod/2+     Comprehensive Metabolic Panel [[id number]]  (Abnormal) Collected:  12/20/21 1321     Specimen: Blood Updated: 12/20/21 1402       Glucose 87 mg/dL         BUN 9 mg/dL         Creatinine 0.67 mg/dL         Sodium 140 mmol/L         Potassium 3.8 mmol/L         Comment: Slight hemolysis detected by analyzer. Results may be affected.          Chloride 107 mmol/L         CO2 22.0 mmol/L         Calcium 8.6 mg/dL         Total Protein 6.3 g/dL         Albumin 3.20 g/dL         ALT (SGPT) 9 U/L         AST (SGOT) 14 U/L         Alkaline Phosphatase 76 U/L         Total Bilirubin 0.2 mg/dL         eGFR Non African Amer 93 mL/min/1.73         Globulin 3.1 gm/dL         A/G Ratio 1.0 g/dL         BUN/Creatinine Ratio 13.4       Anion Gap 11.0 mmol/L       Narrative:       GFR Normal >60  Chronic Kidney Disease <60  Kidney Failure <15        Protime-INR [[id number]]  (Normal) Collected: 12/20/21 1321     Specimen: Blood Updated: 12/20/21 1356       Protime 12.8 Seconds         INR 1.00     Magnesium [[id number]]  (Normal) Collected: 12/20/21 1321     Specimen: Blood Updated: 12/20/21 1357       Magnesium 2.0 mg/dL       BNP [[id number]]  (Abnormal) Collected: 12/20/21 1321     Specimen: Blood Updated: 12/20/21 1359       proBNP 5,629.0 pg/mL       Narrative:       Among patients with dyspnea, NT-proBNP is highly sensitive for the detection of acute congestive heart failure. In addition NT-proBNP of <300 pg/ml effectively rules out acute congestive heart failure with 99% negative predictive value.     Results may be falsely decreased if patient taking Biotin.        D-dimer, Quantitative [[id number]]  (Abnormal) Collected: 12/20/21 1321     Specimen: Blood Updated: 12/20/21 1356       D-Dimer, Quantitative 1.22 mg/L (FEU)       Narrative:       Reference Range is 0-0.50 mg/L FEU. However, results <0.50 mg/L FEU tends to rule out DVT or PE. Results >0.50 mg/L FEU are not useful in predicting absence or presence of DVT or PE.        Troponin [[id number]]  (Normal) Collected: 12/20/21 1321      Specimen: Blood Updated: 12/20/21 1358       Troponin T <0.010 ng/mL       Narrative:       Troponin T Reference Range:  <= 0.03 ng/mL-   Negative for AMI  >0.03 ng/mL-     Abnormal for myocardial necrosis.  Clinicians would have to utilize clinical acumen, EKG, Troponin and serial changes to determine if it is an Acute Myocardial Infarction or myocardial injury due to an underlying chronic condition.         Results may be falsely decreased if patient taking Biotin.        aPTT [[id number]] Collected: 12/20/21 1321     Specimen: Blood Updated: 12/20/21 1617       CT Angiogram Chest     Result Date: 12/20/2021  CT ANGIOGRAM CHEST- 12/20/2021 3:14 PM CST  HISTORY: dyspnea, elevated dimer  COMPARISON: 2/27/2017  DOSE LENGTH PRODUCT: 532 mGy cm. Automated exposure control was also utilized to decrease patient radiation dose.  TECHNIQUE: Axial images the chest are obtained following IV contrast. 2-D and maximal intensity projection images are reconstructed and reviewed.  FINDINGS:  There are scattered bilateral pulmonary emboli involving the distal main pulmonary arteries with emboli extending into all 5 segmental pulmonary artery branches. Thrombus burden slightly greater on the right. RV to LV ratio slightly elevated 1.41 suggesting mild right ventricular heart strain. There is mild cardiomegaly with a left subclavian cardiac pacer device. Trace pericardial fluid. No pleural effusions. No thoracic aortic aneurysm or dissection identified. No pathologic intrathoracic or axillary lymphadenopathy. Fat necrosis suspected within the superior left chest wall.  Postoperative changes of the stomach. Small hiatal hernia. Cholecystectomy clips.  Calcified granuloma within the left upper lobe. Scattered Groundglass opacities suspicious for multifocal pneumonia versus ischemia. No pneumothorax. No discrete endobronchial lesion.  Mild degenerative change of the thoracic spine       1. Diffuse bilateral pulmonary emboli involving  the distal main pulmonary arteries extending into the subsegmental branches. Mild right ventricular heart strain with an RV to LV ratio measuring 1.41. Findings called to Dr. Blue in the ER at 3:30 PM on 12/20/2021 2. Scattered groundglass opacities may represent multifocal pneumonia and/or ischemic changes of the parenchyma. This report was finalized on 12/20/2021 15:38 by Dr. Tati Troy MD.     ED Course      ED Course as of 12/20/21 1625   Mon Dec 20, 2021   1616 51-year-old female with history of GERD, anxiety, TIA who presents to the emergency department with complaint of shortness of breath, cough and wheezing.  Covid was not detected.  BNP was elevated and D-dimer is elevated, troponin was normal.  CTA ordered, shows diffuse bilateral pulmonary emboli involving the distal main  pulmonary arteries extending into the subsegmental branches with evidence of right heart strain, RV to LV ratio 1.4.  Patient also has hemoglobin of 7.4 and hematocrit 26.3. Her most recent records show a hemoglobin of 11.32 years ago, but family states the patient has iron deficiency anemia.  Platelets are normal so I doubt patient has a vaccine induced thrombotic thrombocytopenia, but she may have a vaccine related PE, especially since she received the Ventura & Ventura vaccine.     CT also showed some scattered bibasilar groundglass opacities that could represent multifocal pneumonia versus ischemic changes of the parenchyma. Given the large burden bilateral PE, no fever, normal white count I favor parenchyma ischemic changes over infection. Will not give antibiotics at this point.     I discussed the case with cardiology, Dr. Tovar. They will consult on the patient to see if she would be a candidate for EKOS but recommend admission to the hospitalist given the patient's anemia. We'll send a type and screen. At this point she is hemodynamically stable and does not yet meet criteria for blood transfusion. Occult blood was  negative.  We'll start the patient on a heparin drip given her anemia. [AW]       ED Course User Index  [AW] Robert Blue MD                                           MDM  Number of Diagnoses or Management Options  Dyspnea, unspecified type: new and requires workup  Pulmonary embolism, bilateral (HCC): new and requires workup     Amount and/or Complexity of Data Reviewed  Clinical lab tests: reviewed  Tests in the radiology section of CPT®: reviewed  Tests in the medicine section of CPT®: reviewed  Decide to obtain previous medical records or to obtain history from someone other than the patient: yes     Risk of Complications, Morbidity, and/or Mortality  Presenting problems: high  Diagnostic procedures: high  Management options: high   Patient Progress  Patient progress: stable        Final diagnoses:   Pulmonary embolism, bilateral (HCC)   Dyspnea, unspecified type         ED Disposition         ED Disposition      ED Disposition Condition Comment     Decision to Admit   Level of Care: Telemetry [5]   Diagnosis: Pulmonary embolism, bilateral (HCC) [602384]   Admitting Physician: CORRINA NELSON [[id number]]   Attending Physician: CORRINA NELSON [[id number]]   Isolate for COVID?: No [0]   Certification: I Certify That Inpatient Hospital Services Are Medically Necessary For Greater Than 2 Midnights                No follow-up provider specified.         Medication List       No changes were made to your prescriptions during this visit.            Robert Blue MD  12/20/21 1625                  ED to Hosp-Admission (Current) on 12/20/2021     ED to Hosp-Admission (Current) on 12/20/2021       Department Encounter #   12/20/2021 12:31 PM BH PAD 4B [id number]     Shira Boone APRN   Nurse Practitioner   Medicine   H&P       Attested   Date of Service:  12/20/21 1722   Creation Time:  12/20/21 1722              Attested        Attestation signed by Corrina Nelson MD at 12/20/21 5851   I  personally evaluated and examined the patient in conjunction with HELADIO Durand and agree with the assessment, treatment plan, and disposition of the patient as recorded by her. My history, exam, and further recommendations are:      Danya and I saw patient together in room number #43 of the emergency department this evening.  Patient will be started on a heparin drip for anticoagulation, as we also need to evaluate the etiology of her worsening anemia.  Her hemoglobin is currently 7.4.  I suspect that she has significant iron deficiency given her low MCV.  Her fecal occult blood test is negative.  She denies any menstrual bleeding.    Lower extremity duplex studies in addition echocardiogram.    Appreciate cardiology assistance regarding increased RV/LV ratio seen on CT angiogram of the chest; again, echo is pending.    Patient is not hypoxic, and in fact is maintaining O2 saturations in the high 90s on room air.  She is not tachycardic.     She does have a pacemaker, but it sounds like that she has been lost to follow-up.  She mentions that she is supposed to establish with Dr. Harding of cardiology in [place], [place], and also reports that she cannot recall the last time she had her pacemaker interrogated.    Work-up ongoing.        Electronically signed by Cade Nelson MD, 12/20/2021, 18:27 CST.                    Date of Admission: 12/20/2021  Primary Care Physician: Sushil Nash MD     Subjective      Chief Complaint: Shortness of breath     History of Present Illness  Ms. Tabares is a 51-year-old  female who follows Dr. Sushil Nash for primary care.  She has a medical history significant for tobacco abuse, morbid obesity, and either TIA versus CVA.  She also has a pacemaker for unknown reasons.  She notes she received the [employer]ura & Ventura Covid vaccine in April of this year and has had a lot of difficulty since that time.  She believes that she had a mini stroke at some point in  May or June, but never sought any medical evaluation after this.  She has had a right facial droop and difficulty with her speech since this time.     The patient states on Monday she went to go see her primary care provider for a 3-month checkup and she felt in her usual state of health at that time.  She did get winded on the walk to her doctor's office, but she felt she was at her baseline at this point as she is usually short of breath with exertion.  She states by Tuesday her shortness of breath was much worse with minimal exertion and she has been slowly feeling worse since this time.  She has had no fever or chills.  She has had an infrequent, nonproductive cough.  She has had no chest pain or palpitations.  She has not noted any lower extremity edema.  She states she does have chronic charley horses that she gets every day which seem to be worse in the left leg.     Her sister who is at bedside works at Dr. Nash's office and states they have been monitoring the patient's anemia as they have noted it has been worsening.  She believes her last hemoglobin checked in the office was 8.9.  This is 7.4 today.  Occult blood stool in the emergency department was negative.  Patient denies any bleeding of any kind.  She does crave ice.     Review of Systems   Constitutional: Negative.    HENT: Negative.    Respiratory: Positive for cough and shortness of breath.    Cardiovascular: Negative.    Gastrointestinal: Negative.    Genitourinary: Negative.    Musculoskeletal: Positive for myalgias.   Skin: Negative.    Neurological: Negative.    Psychiatric/Behavioral: Negative.    Otherwise complete ROS reviewed and negative except as mentioned in the HPI.       History        Past Medical History:   Diagnosis Date   • Anxiety     • GERD (gastroesophageal reflux disease)     • Injury of back     • Neuropathy     • Spondylisthesis           Objective      Vital Signs: /86   Pulse 81   Temp 98 °F (36.7 °C) (Oral)    "Resp 20   Ht 160 cm (63\")   Wt 107 kg (235 lb)   SpO2 95%   Breastfeeding No   BMI 41.63 kg/m²   Physical Exam  Vitals and nursing note reviewed.   Constitutional:       General: She is not in acute distress.     Appearance: She is obese. She is not ill-appearing.   HENT:      Head: Normocephalic and atraumatic.      Mouth/Throat:      Comments: Edentulous  Cardiovascular:      Rate and Rhythm: Normal rate and regular rhythm.      Pulses: Normal pulses.      Heart sounds: Normal heart sounds.   Pulmonary:      Effort: Pulmonary effort is normal.      Breath sounds: Normal breath sounds. No wheezing, rhonchi or rales.   Abdominal:      General: Bowel sounds are normal. There is no distension.      Palpations: Abdomen is soft.      Tenderness: There is no abdominal tenderness.   Musculoskeletal:         General: No swelling or tenderness. Normal range of motion.      Cervical back: Normal range of motion and neck supple. No tenderness.   Skin:     General: Skin is warm and dry.      Findings: No erythema or rash.   Neurological:      Mental Status: She is alert and oriented to person, place, and time.      Comments: Right facial droop   Psychiatric:         Mood and Affect: Mood normal.         Behavior: Behavior normal.         Thought Content: Thought content normal.         Judgment: Judgment normal   Negative       aPTT [[id number]]  (Abnormal) Collected: 12/20/21 1321     Specimen: Blood Updated: 12/20/21 1627       PTT 35.1 seconds       COVID-19,[facility] IN-HOUSE,Nasal Swab No Transport Media 3-4 HR TAT - Swab, Nasal Cavity [[id number]]  (Normal) Collected: 12/20/21 1320     Specimen: Swab from Nasal Cavity Updated: 12/20/21 1420       COVID19 Not Detected     Narrative:       Fact sheet for providers: https://www.fda.gov/media/454162/download      Fact sheet for patients: https://www.fda.gov/media/740409/download     Test performed by PCR.     Consider negative results in combination with clinical " observations, patient history, and epidemiological information.     Comprehensive Metabolic Panel [[id number]]  (Abnormal) Collected: 12/20/21 1321     Specimen: Blood Updated: 12/20/21 1402       Glucose 87 mg/dL         BUN 9 mg/dL         Creatinine 0.67 mg/dL         Sodium 140 mmol/L         Potassium 3.8 mmol/L         Comment: Slight hemolysis detected by analyzer. Results may be affected.          Chloride 107 mmol/L         CO2 22.0 mmol/L         Calcium 8.6 mg/dL         Total Protein 6.3 g/dL         Albumin 3.20 g/dL         ALT (SGPT) 9 U/L         AST (SGOT) 14 U/L         Alkaline Phosphatase 76 U/L         Total Bilirubin 0.2 mg/dL         eGFR Non African Amer 93 mL/min/1.73         Globulin 3.1 gm/dL         A/G Ratio 1.0 g/dL         BUN/Creatinine Ratio 13.4       Anion Gap 11.0 mmol/L       Narrative:       GFR Normal >60  Chronic Kidney Disease <60  Kidney Failure <15        Manual Differential [[id number]]  (Abnormal) Collected: 12/20/21 1320     Specimen: Blood Updated: 12/20/21 1402       Neutrophil % 64.6 %         Lymphocyte % 27.3 %         Monocyte % 4.0 %         Eosinophil % 4.0 %         Neutrophils Absolute 4.63 10*3/mm3         Lymphocytes Absolute 1.95 10*3/mm3         Monocytes Absolute 0.29 10*3/mm3         Eosinophils Absolute 0.29 10*3/mm3         nRBC 2.0 /100 WBC         Anisocytosis Mod/2+       Hypochromia Mod/2+       Microcytes Slight/1+       Poikilocytes Slight/1+       Polychromasia Large/3+       WBC Morphology Normal       Giant Platelets Mod/2+     CBC & Differential [[id number]]  (Abnormal) Collected: 12/20/21 1320     Specimen: Blood Updated: 12/20/21 1402     Narrative:       The following orders were created for panel order CBC & Differential.  Procedure                               Abnormality         Status                     ---------                               -----------         ------                     CBC Auto Differential[[id number]]        Abnormal             Final result                  Please view results for these tests on the individual orders.     CBC Auto Differential [[id number]]  (Abnormal) Collected: 12/20/21 1320     Specimen: Blood Updated: 12/20/21 1402       WBC 7.16 10*3/mm3         RBC 3.59 10*6/mm3         Hemoglobin 7.4 g/dL         Hematocrit 26.3 %         MCV 73.3 fL         MCH 20.6 pg         MCHC 28.1 g/dL         RDW 23.2 %         RDW-SD 57.7 fl         Platelets 291 10*3/mm3       Narrative:       ckd     BNP [[id number]]  (Abnormal) Collected: 12/20/21 1321     Specimen: Blood Updated: 12/20/21 1359       proBNP 5,629.0 pg/mL       Narrative:       Among patients with dyspnea, NT-proBNP is highly sensitive for the detection of acute congestive heart failure. In addition NT-proBNP of <300 pg/ml effectively rules out acute congestive heart failure with 99% negative predictive value.     Results may be falsely decreased if patient taking Biotin.        Troponin [[id number]]  (Normal) Collected: 12/20/21 1321     Specimen: Blood Updated: 12/20/21 1358       Troponin T <0.010 ng/mL       Narrative:       Troponin T Reference Range:  <= 0.03 ng/mL-   Negative for AMI  >0.03 ng/mL-     Abnormal for myocardial necrosis.  Clinicians would have to utilize clinical acumen, EKG, Troponin and serial changes to determine if it is an Acute Myocardial Infarction or myocardial injury due to an underlying chronic condition.         Results may be falsely decreased if patient taking Biotin.        Magnesium [[id number]]  (Normal) Collected: 12/20/21 1321     Specimen: Blood Updated: 12/20/21 1357       Magnesium 2.0 mg/dL       Protime-INR [[id number]]  (Normal) Collected: 12/20/21 1321     Specimen: Blood Updated: 12/20/21 1356       Protime 12.8 Seconds         INR 1.00     D-dimer, Quantitative [[id number]]  (Abnormal) Collected: 12/20/21 1321     Specimen: Blood Updated: 12/20/21 1356       D-Dimer, Quantitative 1.22 mg/L (FEU)       Narrative:        Reference Range is 0-0.50 mg/L FEU. However, results <0.50 mg/L FEU tends to rule out DVT or PE. Results >0.50 mg/L FEU are not useful in predicting absence or presence of DVT or PE.                      Imaging Results (Last 24 Hours)      Procedure Component Value Units Date/Time     CT Angiogram Chest [[id number]] Collected: 12/20/21 1531       Updated: 12/20/21 1541     Narrative:       CT ANGIOGRAM CHEST- 12/20/2021 3:14 PM CST     HISTORY: dyspnea, elevated dimer      COMPARISON: 2/27/2017     DOSE LENGTH PRODUCT: 532 mGy cm. Automated exposure control was also  utilized to decrease patient radiation dose.     TECHNIQUE: Axial images the chest are obtained following IV contrast.  2-D and maximal intensity projection images are reconstructed and  reviewed.     FINDINGS:  There are scattered bilateral pulmonary emboli involving the  distal main pulmonary arteries with emboli extending into all 5  segmental pulmonary artery branches. Thrombus burden slightly greater on  the right. RV to LV ratio slightly elevated 1.41 suggesting mild right  ventricular heart strain. There is mild cardiomegaly with a left  subclavian cardiac pacer device. Trace pericardial fluid. No pleural  effusions. No thoracic aortic aneurysm or dissection identified. No  pathologic intrathoracic or axillary lymphadenopathy. Fat necrosis  suspected within the superior left chest wall.     Postoperative changes of the stomach. Small hiatal hernia.  Cholecystectomy clips.     Calcified granuloma within the left upper lobe. Scattered Groundglass  opacities suspicious for multifocal pneumonia versus ischemia. No  pneumothorax. No discrete endobronchial lesion.     Mild degenerative change of the thoracic spine        Impression:       1. Diffuse bilateral pulmonary emboli involving the distal main  pulmonary arteries extending into the subsegmental branches. Mild right  ventricular heart strain with an RV to LV ratio measuring 1.41.  Findings  called to Dr. Blue in the ER at 3:30 PM on 12/20/2021  2. Scattered groundglass opacities may represent multifocal pneumonia  and/or ischemic changes of the parenchyma.  This report was finalized on 12/20/2021 15:38 by Dr. Tati Troy MD.          I have personally reviewed and interpreted the radiology studies and ECG obtained at time of admission.      Assessment / Plan      Assessment:        Active Hospital Problems     Diagnosis     • Pulmonary embolism, bilateral (HCC)     • Microcytic anemia     • Tobacco abuse     • Chronic back pain     • Obesity, Class III, BMI 40-49.9 (morbid obesity) (HCC)        Plan:   1.  Admit inpatient to the hospitalist service.  2.  CTA of the chest in the emergency department showed diffuse bilateral pulmonary emboli involving the distal main pulmonary arteries with mild right ventricular heart strain.  Case was discussed with cardiology in the emergency department, they will evaluate for need for possible EKOS procedure.  Patient will be placed on a heparin drip.  3.  Venous Doppler ultrasound of bilateral lower extremities.  4.  Transthoracic echocardiogram.  5.  CTA also shows scattered groundglass opacities which may represent multifocal pneumonia and/or ischemic changes of the parenchyma.  Do not feel patient has pneumonia at this time. She has normal white blood cell count, denies any significant cough or fever.  We will continue to monitor symptoms closely.  DuoNeb breathing treatments added as needed and incentive spirometry.  6.  Further work-up for anemia.  Occult blood stool checked in the ER which was found to be negative.  Will check iron studies, will likely benefit from IV Venofer.  7.  Work-up ongoing.  8.  Labs in a.m.  We will check modifiable risk factors with hemoglobin A1c and lipid panel.  Patient may benefit from further stroke prevention therapy with aspirin and atorvastatin at time of discharge.        Code Status: Full.  In the event the  patient is unable to speak for herself she designates her sister as her healthcare surrogate.     I discussed the patients findings and my recommendations with the patient, sister at bedside, and Dr. Cade Nelson.     Estimated length of stay: To be determined.     Electronically signed by HELADIO Rg, 12/20/2021, 17:36 CST.                           Cosigned by: Cade Nelson MD at 12/20/21 1831      ED to Hosp-Admission (Current) on 12/20/2021     ED to Hosp-Admission (Current) on 12/20/2021        Revision & Routing History        21/21 0731 97.5 (36.4) 77 16 95/76 Lying room air 95   12/21/21 0522 97.6 (36.4) 77 16 106/66 Lying room air 96   12/21/21 0010                  CT Angiogram Chest [[id number]] (Order [id number])  Order  Status: Final result       Patient Location    Patient Class Location   [facility], [facility], 1     [phone]     Study Notes       Tabby Cummins Den on 12/20/2021  3:17 PM   Dlp 532 mGycm, dyspnea, elevated dimer, eval for pe   Appointment Information      PACS Images     Radiology Images    Study Result    Narrative & Impression   CT ANGIOGRAM CHEST- 12/20/2021 3:14 PM CST     HISTORY: dyspnea, elevated dimer      COMPARISON: 2/27/2017     DOSE LENGTH PRODUCT: 532 mGy cm. Automated exposure control was also  utilized to decrease patient radiation dose.     TECHNIQUE: Axial images the chest are obtained following IV contrast.  2-D and maximal intensity projection images are reconstructed and  reviewed.     FINDINGS:  There are scattered bilateral pulmonary emboli involving the  distal main pulmonary arteries with emboli extending into all 5  segmental pulmonary artery branches. Thrombus burden slightly greater on  the right. RV to LV ratio slightly elevated 1.41 suggesting mild right  ventricular heart strain. There is mild cardiomegaly with a left  subclavian cardiac pacer device. Trace pericardial fluid. No pleural  effusions. No thoracic aortic aneurysm or  dissection identified. No  pathologic intrathoracic or axillary lymphadenopathy. Fat necrosis  suspected within the superior left chest wall.     Postoperative changes of the stomach. Small hiatal hernia.  Cholecystectomy clips.     Calcified granuloma within the left upper lobe. Scattered Groundglass  opacities suspicious for multifocal pneumonia versus ischemia. No  pneumothorax. No discrete endobronchial lesion.     Mild degenerative change of the thoracic spine     IMPRESSION:  1. Diffuse bilateral pulmonary emboli involving the distal main  pulmonary arteries extending into the subsegmental branches. Mild right  ventricular heart strain with an RV to LV ratio measuring 1.41. Findings  called to Dr. Blue in the ER at 3:30 PM on 12/20/2021  2. Scattered groundglass opacities may represent multifocal pneumonia  and/or ischemic changes of the parenchyma.  This report was finalized on 12/20/2021 15:38 by Dr. Tati Troy MD.       Imaging    CT Angiogram Chest (Order: [id number]) - 12/20/2021    Result History    CT Angiogram Chest (Order #[id number]) on 12/20/2021 - Order Result History Report    Reprint Order Requisition    CT Angiogram Chest (Order #[id number]) on 12/20/21       Provider Comment To Patient     Not Released  Not seen       CT Angiogram Chest: Patient Communication     Not Released  Not seen     Signed by    Signed Date/Time  Phone Pager   TATI TROY 12/20/2021  3:38 PM [phone]  3:[phone]      Exam Information        Information: Blood         0 Result Notes    Component   Ref Range & Units 06:13 1 d ago 2 yr ago 4 yr ago   WBC   3.40 - 10.80 10*3/mm3 5.33  7.16  8.28  6.34 R    RBC   3.77 - 5.28 10*6/mm3 3.32 Low   3.59 Low   3.94  4.73 R    Hemoglobin   12.0 - 15.9 g/dL 6.8 Low Critical   7.4 Low   11.3 Low   14.2 R    Hematocrit   34.0 - 46.6 % 24.2 Low   26.3 Low   36.1  42.3 R    MCV   79.0 - 97.0 fL 72.9 Low   73.3 Low   91.6  89.4 R    MCH   26.6 - 33.0 pg  20.5 Low   20.6 Low   28.7  30.0 R    MCHC   31.5 - 35.7 g/dL 28.1 Low   28.1 Low   31.3 Low   33.6 R    RDW   12.3 - 15.4 % 22.9 High   23.2 High   20.9 High   14.7 R    RDW-SD   37.0 - 54.0 fl 57.8 High   57.7 High   70.2 High   48.2 R    Platelets   140 - 450 10*3/mm3 273  291            Prepare RBC, 1 Units  Order: [id number]   Status: Final result     Visible to patient: No (scheduled for 12/21/2021  9:23 PM)     Next appt: None     0 Result Notes    Component 07:23   Product Code [id number]    Unit Number [id number]    UNIT  ABO A    UNIT  RH POS    Crossmatch Interpretation Compatible    Dispense Status XM    Blood Expiration Date [id number]    Blood Type Barcode 6200              Current Facility-Administered Medications   Medication Dose Route Frequency Provider Last Rate Last Admin   • acetaminophen (TYLENOL) tablet 650 mg  650 mg Oral Q4H PRN Cade Nelson MD       • DULoxetine (CYMBALTA) DR capsule 90 mg  90 mg Oral Nightly Carroll Wren DO   90 mg at 12/21/21 0120   • gabapentin (NEURONTIN) capsule 300 mg  300 mg Oral TID Carroll Wren DO   300 mg at 12/21/21 0120   • heparin (porcine) injection 4,280 Units  40 Units/kg Intravenous PRN Cade Nelson MD   4,280 Units at 12/21/21 0733   • heparin (porcine) injection 8,560 Units  80 Units/kg Intravenous PRN Cade Nelson MD       • heparin 31998 units/250 mL (100 units/mL) in 0.45 % NaCl infusion  1,500 Units/hr Intravenous Titrated Cade Nelson MD 16 mL/hr at 12/21/21 0733 1,600 Units/hr at 12/21/21 0733   • ipratropium-albuterol (DUO-NEB) nebulizer solution 3 mL  3 mL Nebulization Q4H PRN Cade Nelson MD       • ondansetron (ZOFRAN) injection 4 mg  4 mg Intravenous Q6H PRN Cade Nelson MD       • oxyCODONE-acetaminophen (PERCOCET) 7.5-325 MG per tablet 1 tablet  1 tablet Oral Q8H PRN Carroll Wren, DO   1 tablet at 12/21/21 0121   • rOPINIRole (REQUIP) tablet 1 mg  1 mg Oral BID  Carroll Wren, DO   1 mg at 12/21/21 0120   • sodium chloride 0.9 % flush 10 mL  10 mL Intravenous Q12H Cade Nelson MD   10 mL at 12/20/21 2254   • sodium chloride 0.9 % flush 10 mL  10 mL Intravenous PRN Cade Nelson MD       • sodium chloride 0.9 % infusion  100 mL/hr Intravenous Continuous Cade Nelson  mL/hr at 12/20/21 2254 100 mL/hr at 12/20/21 2251

## 2021-12-21 NOTE — CONSULTS
"[facility] HEART GROUP CONSULT NOTE    Referring Provider:Dr. Ryder Nelson    Reason for Consultation: \"Acute PE with abnormal RV/LV ratio on imaging\"    Chief Complaint   Patient presents with   • Shortness of Breath       Subjective .     History of present illness:  Yecenia Tabares is a 51 y.o. female who presented to the emergency department for further work-up of complaints of shortness of breath.  She notes some increased shortness of breath last week.  Over the course of the week she noticed increased shortness of breath with minimal exertion.  Upon work-up in the emergency department she was found to have elevated D-dimer and CTA of the chest revealed diffuse bilateral pulmonary emboli.  Troponin was normal.  BNP was elevated ~5500.  Her initial hemoglobin was 7.4.  Occult blood stool was negative.    She follows with Dr. Nash for primary care.  She has been following with their office for concern of either TIA versus CVA.  She has struggled with symptoms since she received a [employer] Covid vaccination in April of this year.  She also is a current every day smoker and morbidly obese.    Cardiology was consulted as the patient has an acute pulmonary embolism and we were asked to evaluate the patient for possible procedural intervention.  She is currently on a heparin drip and subsequent CBC this morning revealed a declining hemoglobin of 6.8.    Currently the patient is resting in bed.  She has had an echocardiogram performed this morning as well as lower extremity venous studies which preliminarily reveal extensive DVT in the left lower extremity.  She denies any chest pain, orthopnea, PND.  She denies any shortness of breath at rest but reports she has not been short of breath prior to her presentation only with exertion.  She denies lower extremity pain or discomfort.    History  Past Medical History:   Diagnosis Date   • Anxiety    • GERD (gastroesophageal reflux disease)    • " Injury of back    • Neuropathy    • Spondylisthesis    ,   Past Surgical History:   Procedure Laterality Date   • BREAST SURGERY     • CHOLECYSTECTOMY     • DILATATION AND CURETTAGE     • GASTRIC SLEEVE LAPAROSCOPIC     • INSERT / REPLACE / REMOVE PACEMAKER     • PACEMAKER IMPLANTATION     ,   Family History   Problem Relation Age of Onset   • Stroke Mother    • Cancer Father    • Stroke Sister    ,   Social History     Tobacco Use   • Smoking status: Current Every Day Smoker     Packs/day: 1.00     Types: Cigarettes   • Smokeless tobacco: Not on file   Substance Use Topics   • Alcohol use: No   • Drug use: No   ,     Medications  Current Facility-Administered Medications   Medication Dose Route Frequency Provider Last Rate Last Admin   • acetaminophen (TYLENOL) tablet 650 mg  650 mg Oral Q4H PRN Cade Nelson MD       • DULoxetine (CYMBALTA) DR capsule 90 mg  90 mg Oral Nightly Carroll Wren DO   90 mg at 12/21/21 0120   • gabapentin (NEURONTIN) capsule 300 mg  300 mg Oral TID Carroll Wren DO   300 mg at 12/21/21 0120   • heparin (porcine) injection 4,280 Units  40 Units/kg Intravenous PRN Cade Nelson MD   4,280 Units at 12/21/21 0733   • heparin (porcine) injection 8,560 Units  80 Units/kg Intravenous PRN Cade Nelson MD       • heparin 14208 units/250 mL (100 units/mL) in 0.45 % NaCl infusion  1,500 Units/hr Intravenous Titrated Cade Nelson MD 16 mL/hr at 12/21/21 0733 1,600 Units/hr at 12/21/21 0733   • ipratropium-albuterol (DUO-NEB) nebulizer solution 3 mL  3 mL Nebulization Q4H PRN Cade Nelson MD       • ondansetron (ZOFRAN) injection 4 mg  4 mg Intravenous Q6H PRN Cade Nelson MD       • oxyCODONE-acetaminophen (PERCOCET) 7.5-325 MG per tablet 1 tablet  1 tablet Oral Q8H PRN Carroll Wren DO   1 tablet at 12/21/21 0121   • rOPINIRole (REQUIP) tablet 1 mg  1 mg Oral BID Carroll Wren DO   1 mg at 12/21/21 0120   • sodium chloride  "0.9 % flush 10 mL  10 mL Intravenous Q12H Cade Nelson MD   10 mL at 12/20/21 2254   • sodium chloride 0.9 % flush 10 mL  10 mL Intravenous PRN Cade Nelson MD       • sodium chloride 0.9 % infusion  100 mL/hr Intravenous Continuous Cade Nelson  mL/hr at 12/20/21 2254 100 mL/hr at 12/20/21 2254     Allergies:  Erythromycin, Penicillins, and Ampicillin    Review of Systems  Review of Systems   Constitutional: Positive for malaise/fatigue. Negative for weight gain and weight loss.   Cardiovascular: Positive for dyspnea on exertion. Negative for chest pain, claudication, leg swelling, near-syncope, orthopnea, palpitations, paroxysmal nocturnal dyspnea and syncope.   Respiratory: Negative for cough and wheezing.    Gastrointestinal: Positive for heartburn. Negative for bloating, abdominal pain, hematemesis, hematochezia, melena, nausea and vomiting.   Neurological: Negative for dizziness, focal weakness, headaches, light-headedness and weakness.   Psychiatric/Behavioral: Negative for altered mental status.       Objective     Physical Exam:  Patient Vitals for the past 24 hrs:   BP Temp Temp src Pulse Resp SpO2 Height Weight   12/21/21 0731 95/76 97.5 °F (36.4 °C) Oral 77 16 95 % -- --   12/21/21 0522 106/66 97.6 °F (36.4 °C) Oral 77 16 96 % -- --   12/21/21 0010 132/80 98.7 °F (37.1 °C) Oral 81 16 96 % -- --   12/20/21 2131 -- -- -- 78 18 98 % -- --   12/20/21 1953 124/75 -- -- 77 18 97 % 160 cm (63\") 113 kg (249 lb)   12/20/21 1901 (!) 126/101 -- -- 81 -- 96 % -- --   12/20/21 1846 109/52 -- -- 80 -- 99 % -- --   12/20/21 1831 137/57 -- -- 80 -- 97 % -- --   12/20/21 1816 129/98 -- -- 92 -- 96 % -- --   12/20/21 1801 126/84 -- -- 80 -- 100 % -- --   12/20/21 1746 118/85 -- -- 82 -- 100 % -- --   12/20/21 1731 122/46 -- -- 82 -- 98 % -- --   12/20/21 1716 139/78 -- -- 79 -- 94 % -- --   12/20/21 1702 128/86 -- -- 81 -- 95 % -- --   12/20/21 1649 -- -- -- 80 -- 94 % -- --   12/20/21 " "1616 132/94 -- -- 83 -- 98 % -- --   12/20/21 1601 139/74 -- -- 80 -- 95 % -- --   12/20/21 1546 123/83 -- -- 80 -- 96 % -- --   12/20/21 1531 127/76 -- -- 79 -- 98 % -- --   12/20/21 1501 142/90 -- -- 80 -- 96 % -- --   12/20/21 1446 141/93 -- -- 80 -- 97 % -- --   12/20/21 1432 -- -- -- 87 -- 93 % -- --   12/20/21 1431 (!) 137/105 -- -- -- -- -- -- --   12/20/21 1416 (!) 152/104 -- -- 82 -- 97 % -- --   12/20/21 1401 141/100 -- -- 84 -- 97 % -- --   12/20/21 1346 (!) 151/116 -- -- 82 -- 93 % -- --   12/20/21 1337 -- -- -- 87 -- 97 % -- --   12/20/21 1331 (!) 132/111 -- -- -- -- -- -- --   12/20/21 1320 -- -- -- 80 -- 99 % -- --   12/20/21 1316 139/90 -- -- -- -- -- -- --   12/20/21 1301 101/67 -- -- 79 -- 98 % -- --   12/20/21 1246 112/52 -- -- 80 -- 97 % -- --   12/20/21 1229 123/52 98 °F (36.7 °C) Oral 86 20 98 % 160 cm (63\") 107 kg (235 lb)     Vitals reviewed.   Constitutional:       General: Awake. Not in acute distress.     Appearance: Well-developed and well-groomed. Morbidly obese. Chronically ill-appearing.   HENT:      Head: Normocephalic and atraumatic.    Mouth/Throat:      Dentition: Abnormal dentition.   Pulmonary:      Effort: Pulmonary effort is normal.      Breath sounds: Normal breath sounds.   Cardiovascular:      Normal rate. Regular rhythm.   Edema:     Peripheral edema absent.   Abdominal:      Palpations: Abdomen is soft.   Musculoskeletal:      Cervical back: Normal range of motion and neck supple. Skin:     General: Skin is warm and dry.   Neurological:      Mental Status: Alert, oriented to person, place, and time and oriented to person, place and time.      Cranial Nerves: Facial asymmetry present.   Psychiatric:         Attention and Perception: Attention normal.         Mood and Affect: Mood normal.         Speech: Speech normal.         Behavior: Behavior is cooperative.         Cognition and Memory: Cognition and memory normal.          Results Review:   I reviewed the patient's new " clinical results.    Lab Results (last 72 hours)     Procedure Component Value Units Date/Time    Basic Metabolic Panel [[id number]]  (Abnormal) Collected: 12/21/21 0613    Specimen: Blood Updated: 12/21/21 0653     Glucose 90 mg/dL      BUN 8 mg/dL      Creatinine 0.69 mg/dL      Sodium 143 mmol/L      Potassium 3.4 mmol/L      Chloride 108 mmol/L      CO2 23.0 mmol/L      Calcium 8.4 mg/dL      eGFR Non African Amer 90 mL/min/1.73      BUN/Creatinine Ratio 11.6     Anion Gap 12.0 mmol/L     Narrative:      GFR Normal >60  Chronic Kidney Disease <60  Kidney Failure <15      Lipid Panel [[id number]] Collected: 12/21/21 0613    Specimen: Blood Updated: 12/21/21 0653     Total Cholesterol 92 mg/dL      Triglycerides 85 mg/dL      HDL Cholesterol 41 mg/dL      LDL Cholesterol  34 mg/dL      VLDL Cholesterol 17 mg/dL      LDL/HDL Ratio 0.83    Narrative:      Cholesterol Reference Ranges  (U.S. Department of Health and Human Services ATP III Classifications)    Desirable          <200 mg/dL  Borderline High    200-239 mg/dL  High Risk          >240 mg/dL      Triglyceride Reference Ranges  (U.S. Department of Health and Human Services ATP III Classifications)    Normal           <150 mg/dL  Borderline High  150-199 mg/dL  High             200-499 mg/dL  Very High        >500 mg/dL    HDL Reference Ranges  (U.S. Department of Health and Human Services ATP III Classifcations)    Low     <40 mg/dl (major risk factor for CHD)  High    >60 mg/dl ('negative' risk factor for CHD)        LDL Reference Ranges  (U.S. Department of Health and Human Services ATP III Classifcations)    Optimal          <100 mg/dL  Near Optimal     100-129 mg/dL  Borderline High  130-159 mg/dL  High             160-189 mg/dL  Very High        >189 mg/dL    CBC (No Diff) [[id number]]  (Abnormal) Collected: 12/21/21 0613    Specimen: Blood Updated: 12/21/21 0645     WBC 5.33 10*3/mm3      RBC 3.32 10*6/mm3      Hemoglobin 6.8 g/dL      Hematocrit 24.2 %       MCV 72.9 fL      MCH 20.5 pg      MCHC 28.1 g/dL      RDW 22.9 %      RDW-SD 57.8 fl      Platelets 273 10*3/mm3     aPTT [[id number]]  (Abnormal) Collected: 12/21/21 0613    Specimen: Blood Updated: 12/21/21 0634     PTT 64.0 seconds     aPTT [[id number]]  (Abnormal) Collected: 12/20/21 2242    Specimen: Blood Updated: 12/20/21 2341     PTT 95.7 seconds     Iron Profile [[id number]]  (Abnormal) Collected: 12/20/21 1632    Specimen: Blood Updated: 12/20/21 2056     Iron 10 mcg/dL      Iron Saturation 2 %      Transferrin 302 mg/dL      TIBC 450 mcg/dL     Hemoglobin A1c [[id number]]  (Normal) Collected: 12/20/21 1320    Specimen: Blood Updated: 12/20/21 1829     Hemoglobin A1C 5.30 %     Narrative:      Hemoglobin A1C Ranges:    Increased Risk for Diabetes  5.7% to 6.4%  Diabetes                     >= 6.5%  Diabetic Goal                < 7.0%    Troponin [[id number]]  (Normal) Collected: 12/20/21 1632    Specimen: Blood Updated: 12/20/21 1703     Troponin T <0.010 ng/mL     Narrative:      Troponin T Reference Range:  <= 0.03 ng/mL-   Negative for AMI  >0.03 ng/mL-     Abnormal for myocardial necrosis.  Clinicians would have to utilize clinical acumen, EKG, Troponin and serial changes to determine if it is an Acute Myocardial Infarction or myocardial injury due to an underlying chronic condition.       Results may be falsely decreased if patient taking Biotin.      POC Occult Blood Stool [[id number]]  (Normal) Collected: 12/20/21 1647    Specimen: Stool Updated: 12/20/21 1647     Fecal Occult Blood Negative     Lot Number [id number]     Expiration Date 4/30/22     DEVELOPER LOT NUMBER [id number]     DEVELOPER EXPIRATION DATE 4/30/22     Positive Control Positive     Negative Control Negative    aPTT [[id number]]  (Abnormal) Collected: 12/20/21 1321    Specimen: Blood Updated: 12/20/21 1627     PTT 35.1 seconds     COVID-19,[facility] IN-HOUSE,Nasal Swab No Transport Media 3-4 HR TAT - Swab, Nasal Cavity [[id number]]  (Normal)  Collected: 12/20/21 1320    Specimen: Swab from Nasal Cavity Updated: 12/20/21 1420     COVID19 Not Detected    Narrative:      Fact sheet for providers: https://www.fda.gov/media/945219/download     Fact sheet for patients: https://www.fda.gov/media/308088/download    Test performed by PCR.    Consider negative results in combination with clinical observations, patient history, and epidemiological information.    Comprehensive Metabolic Panel [[id number]]  (Abnormal) Collected: 12/20/21 1321    Specimen: Blood Updated: 12/20/21 1402     Glucose 87 mg/dL      BUN 9 mg/dL      Creatinine 0.67 mg/dL      Sodium 140 mmol/L      Potassium 3.8 mmol/L      Comment: Slight hemolysis detected by analyzer. Results may be affected.        Chloride 107 mmol/L      CO2 22.0 mmol/L      Calcium 8.6 mg/dL      Total Protein 6.3 g/dL      Albumin 3.20 g/dL      ALT (SGPT) 9 U/L      AST (SGOT) 14 U/L      Alkaline Phosphatase 76 U/L      Total Bilirubin 0.2 mg/dL      eGFR Non African Amer 93 mL/min/1.73      Globulin 3.1 gm/dL      A/G Ratio 1.0 g/dL      BUN/Creatinine Ratio 13.4     Anion Gap 11.0 mmol/L     Narrative:      GFR Normal >60  Chronic Kidney Disease <60  Kidney Failure <15      Manual Differential [[id number]]  (Abnormal) Collected: 12/20/21 1320    Specimen: Blood Updated: 12/20/21 1402     Neutrophil % 64.6 %      Lymphocyte % 27.3 %      Monocyte % 4.0 %      Eosinophil % 4.0 %      Neutrophils Absolute 4.63 10*3/mm3      Lymphocytes Absolute 1.95 10*3/mm3      Monocytes Absolute 0.29 10*3/mm3      Eosinophils Absolute 0.29 10*3/mm3      nRBC 2.0 /100 WBC      Anisocytosis Mod/2+     Hypochromia Mod/2+     Microcytes Slight/1+     Poikilocytes Slight/1+     Polychromasia Large/3+     WBC Morphology Normal     Giant Platelets Mod/2+    CBC & Differential [[id number]]  (Abnormal) Collected: 12/20/21 1320    Specimen: Blood Updated: 12/20/21 1402    Narrative:      The following orders were created for panel order CBC  & Differential.  Procedure                               Abnormality         Status                     ---------                               -----------         ------                     CBC Auto Differential[[id number]]        Abnormal            Final result                 Please view results for these tests on the individual orders.    CBC Auto Differential [[id number]]  (Abnormal) Collected: 12/20/21 1320    Specimen: Blood Updated: 12/20/21 1402     WBC 7.16 10*3/mm3      RBC 3.59 10*6/mm3      Hemoglobin 7.4 g/dL      Hematocrit 26.3 %      MCV 73.3 fL      MCH 20.6 pg      MCHC 28.1 g/dL      RDW 23.2 %      RDW-SD 57.7 fl      Platelets 291 10*3/mm3     Narrative:      ckd    BNP [[id number]]  (Abnormal) Collected: 12/20/21 1321    Specimen: Blood Updated: 12/20/21 1359     proBNP 5,629.0 pg/mL     Narrative:      Among patients with dyspnea, NT-proBNP is highly sensitive for the detection of acute congestive heart failure. In addition NT-proBNP of <300 pg/ml effectively rules out acute congestive heart failure with 99% negative predictive value.    Results may be falsely decreased if patient taking Biotin.      Troponin [[id number]]  (Normal) Collected: 12/20/21 1321    Specimen: Blood Updated: 12/20/21 1358     Troponin T <0.010 ng/mL     Narrative:      Troponin T Reference Range:  <= 0.03 ng/mL-   Negative for AMI  >0.03 ng/mL-     Abnormal for myocardial necrosis.  Clinicians would have to utilize clinical acumen, EKG, Troponin and serial changes to determine if it is an Acute Myocardial Infarction or myocardial injury due to an underlying chronic condition.       Results may be falsely decreased if patient taking Biotin.      Magnesium [[id number]]  (Normal) Collected: 12/20/21 1321    Specimen: Blood Updated: 12/20/21 1357     Magnesium 2.0 mg/dL     Protime-INR [[id number]]  (Normal) Collected: 12/20/21 1321    Specimen: Blood Updated: 12/20/21 1356     Protime 12.8 Seconds      INR 1.00    D-dimer,  Quantitative [[id number]]  (Abnormal) Collected: 12/20/21 1321    Specimen: Blood Updated: 12/20/21 1356     D-Dimer, Quantitative 1.22 mg/L (FEU)     Narrative:      Reference Range is 0-0.50 mg/L FEU. However, results <0.50 mg/L FEU tends to rule out DVT or PE. Results >0.50 mg/L FEU are not useful in predicting absence or presence of DVT or PE.            No results found for: ECHOEFEST    Imaging Results (Last 72 Hours)     Procedure Component Value Units Date/Time    US Venous Doppler Lower Extremity Bilateral (duplex) [[id number]] Resulted: 12/21/21 0848     Updated: 12/21/21 0907    CT Angiogram Chest [[id number]] Collected: 12/20/21 1531     Updated: 12/20/21 1541    Narrative:      CT ANGIOGRAM CHEST- 12/20/2021 3:14 PM CST     HISTORY: dyspnea, elevated dimer      COMPARISON: 2/27/2017     DOSE LENGTH PRODUCT: 532 mGy cm. Automated exposure control was also  utilized to decrease patient radiation dose.     TECHNIQUE: Axial images the chest are obtained following IV contrast.  2-D and maximal intensity projection images are reconstructed and  reviewed.     FINDINGS:  There are scattered bilateral pulmonary emboli involving the  distal main pulmonary arteries with emboli extending into all 5  segmental pulmonary artery branches. Thrombus burden slightly greater on  the right. RV to LV ratio slightly elevated 1.41 suggesting mild right  ventricular heart strain. There is mild cardiomegaly with a left  subclavian cardiac pacer device. Trace pericardial fluid. No pleural  effusions. No thoracic aortic aneurysm or dissection identified. No  pathologic intrathoracic or axillary lymphadenopathy. Fat necrosis  suspected within the superior left chest wall.     Postoperative changes of the stomach. Small hiatal hernia.  Cholecystectomy clips.     Calcified granuloma within the left upper lobe. Scattered Groundglass  opacities suspicious for multifocal pneumonia versus ischemia. No  pneumothorax. No discrete  endobronchial lesion.     Mild degenerative change of the thoracic spine       Impression:      1. Diffuse bilateral pulmonary emboli involving the distal main  pulmonary arteries extending into the subsegmental branches. Mild right  ventricular heart strain with an RV to LV ratio measuring 1.41. Findings  called to Dr. Blue in the ER at 3:30 PM on 12/20/2021  2. Scattered groundglass opacities may represent multifocal pneumonia  and/or ischemic changes of the parenchyma.  This report was finalized on 12/20/2021 15:38 by Dr. Tati Troy MD.          Assessment     1.  Acute bilateral pulmonary embolism: Noted on CTA  2.  Anemia: Ongoing work-up  3.  Chronic back pain  4.  Tobacco abuse  5.  Morbid obesity: BMI 44.11  6.  Preliminary results revealed positive DVT  7.  Hypokalemia    Plan     Cardiology has been asked to evaluate the patient due to the finding of acute pulmonary emboli.  The patient presented with complaints of worsening shortness of breath with exertion and found to have pulmonary emboli on imaging with associated elevated BNP and right heart strain on CT.  She had troponin checks which have been normal.  Based on the finding it was asked for us to evaluate the patient to see if she would be a candidate for procedural intervention for treatment of her PE.  Unfortunately, additional work-up in the emergency department revealed anemia with hemoglobin around 7.  Subsequent follow-up this morning noted decline in her hemoglobin at 6.8.  She is currently receiving PRBCs.  She was initially managed on a heparin drip and remains heparinized at this time.  She denies any eliot blood loss reporting no hematic emesis, no vaginal bleeding, no rectal bleeding or dark stools.  With the patient having anemia requiring blood transfusion, it would be felt that procedural intervention for treatment of her PE would not be safe.  Recommendations would include ongoing treatment with anticoagulation and close  monitoring of her blood counts.  Further work-up for her anemia has been deferred to the primary service.    She does have an echocardiogram which has been completed and not read at this time.  Certainly if there were any unexpected results (other than RV strain which is expected based on CT findings) we could follow-up regarding those test results tomorrow otherwise, cardiology will sign off.  Thank you for this consultation, please  recall if needed.          Electronically signed by HELADIO Gil, 12/21/21, 9:32 AM CST.      Please note this cardiology consultation note is the result of a face to face consultation with the patient, in addition to reviewing medical records at length by myself, HELADIO Gil.       Time: 30 minutes

## 2021-12-21 NOTE — PLAN OF CARE
Problem: Adult Inpatient Plan of Care  Goal: Plan of Care Review  Outcome: Ongoing, Progressing  Flowsheets (Taken 12/21/2021 1731)  Progress: improving  Plan of Care Reviewed With: patient  Outcome Summary: Pt continues on heparin gtt, titrated per MAR. One unit PRBC given with improvement in HGB. VSS, NSR 74-87 on tele. Venous doppler and echo done today, see results. Will update MD as needed

## 2021-12-21 NOTE — PROGRESS NOTES
[facility] Medicine Services  INPATIENT PROGRESS NOTE    Length of Stay: 1  Date of Admission: 12/20/2021  Primary Care Physician: Sushil Nash MD    Subjective   Chief Complaint: Follow-up shortness of breath  HPI   Sitting up in bed.  Sister in room.  Patient denies nausea or vomiting.  No oxygen use.  Saturation 96% on room air.  Hemoglobin 6.9 today and transfused 1 unit packed red blood cells.  Iron and saturation low.  Will transfuse IV Venofer.  Fecal occult blood negative.  Patient denies bright red bleeding per rectum or dark tarry stools.  Suspect iron deficiency anemia as patient has also had gastric sleeve resection.  Cardiology does not plan intervention as patient is requiring blood transfusion.  Results of test discussed with patient and sister present in room.  Continue heparin drip with plan to transition over to Eliquis when anemia stable.  She denies fever or chills.  No cough.  Lungs sound clear.  Reports fatigue likely multifactorial secondary to anemia and pulmonary emboli.  She is requesting that her Topamax be resumed if she takes daily for headache prevention.    Review of Systems   Constitutional: Positive for fatigue. Negative for chills and fever.   HENT: Negative for congestion and trouble swallowing.    Eyes: Negative for photophobia and visual disturbance.   Respiratory: Positive for shortness of breath. Negative for cough and wheezing.    Cardiovascular: Negative for chest pain, palpitations and leg swelling.   Gastrointestinal: Negative for blood in stool, constipation, diarrhea, nausea and vomiting.   Endocrine: Negative for cold intolerance, heat intolerance and polyuria.   Genitourinary: Negative for dysuria, frequency and urgency.   Musculoskeletal: Negative for gait problem.   Skin: Negative for color change, pallor, rash and wound.   Allergic/Immunologic: Negative for immunocompromised state.   Neurological: Positive for weakness and  headaches. Negative for light-headedness.   Hematological: Negative for adenopathy. Does not bruise/bleed easily.   Psychiatric/Behavioral: Negative for agitation, behavioral problems and confusion.      All pertinent negatives and positives are as above. All other systems have been reviewed and are negative unless otherwise stated.     Objective    Temp:  [97.5 °F (36.4 °C)-98.7 °F (37.1 °C)] 98 °F (36.7 °C)  Heart Rate:  [77-92] 88  Resp:  [16-18] 18  BP: ()/() 152/71  Physical Exam  Vitals and nursing note reviewed.   Constitutional:       Appearance: She is obese.      Comments: Lying in bed.  No oxygen in use.  Sister in room.   HENT:      Head: Normocephalic and atraumatic.      Nose: No congestion or rhinorrhea.      Mouth/Throat:      Pharynx: Oropharynx is clear. No oropharyngeal exudate or posterior oropharyngeal erythema.   Eyes:      Extraocular Movements: Extraocular movements intact.      Pupils: Pupils are equal, round, and reactive to light.   Cardiovascular:      Rate and Rhythm: Normal rate and regular rhythm.      Comments: Normal sinus rhythm 76-86 on telemetry.  Pulmonary:      Effort: Pulmonary effort is normal.      Breath sounds: Normal breath sounds. No wheezing, rhonchi or rales.      Comments: No oxygen in use.  Abdominal:      Palpations: Abdomen is soft.      Tenderness: There is no abdominal tenderness.   Genitourinary:     Comments: Voiding.  Musculoskeletal:         General: No swelling or tenderness.      Cervical back: Normal range of motion and neck supple.   Skin:     General: Skin is warm and dry.   Neurological:      General: No focal deficit present.      Mental Status: She is alert and oriented to person, place, and time.   Psychiatric:         Mood and Affect: Mood normal.         Behavior: Behavior normal.         Thought Content: Thought content normal.         Judgment: Judgment normal.       Results Review:  I have reviewed the labs, radiology results, and  diagnostic studies.    Laboratory Data:      Results from last 7 days   Lab Units 12/21/21  0613 12/20/21  1320   WBC 10*3/mm3 5.33 7.16   HEMOGLOBIN g/dL 6.8* 7.4*   HEMATOCRIT % 24.2* 26.3*   PLATELETS 10*3/mm3 273 291     Results from last 7 days   Lab Units 12/21/21  0613 12/20/21  1321 12/20/21  1321   SODIUM mmol/L 143  --  140   POTASSIUM mmol/L 3.4*  --  3.8   CHLORIDE mmol/L 108*  --  107   CO2 mmol/L 23.0  --  22.0   BUN mg/dL 8  --  9   CREATININE mg/dL 0.69  --  0.67   GLUCOSE mg/dL 90   < > 87   CALCIUM mg/dL 8.4*  --  8.6   ALT (SGPT) U/L  --   --  9    < > = values in this interval not displayed.     Culture Data:    No results found for: BLOODCX, URINECX, WOUNDCX, MRSACX, RESPCX, STOOLCX    Radiology Data:   Imaging Results (Last 7 Days)     Procedure Component Value Units Date/Time    US Venous Doppler Lower Extremity Bilateral (duplex) [[id number]] Resulted: 12/21/21 0848     Updated: 12/21/21 0907    CT Angiogram Chest [[id number]] Collected: 12/20/21 1531     Updated: 12/20/21 1541    Narrative:      CT ANGIOGRAM CHEST- 12/20/2021 3:14 PM CST     HISTORY: dyspnea, elevated dimer      COMPARISON: 2/27/2017     DOSE LENGTH PRODUCT: 532 mGy cm. Automated exposure control was also  utilized to decrease patient radiation dose.     TECHNIQUE: Axial images the chest are obtained following IV contrast.  2-D and maximal intensity projection images are reconstructed and  reviewed.     FINDINGS:  There are scattered bilateral pulmonary emboli involving the  distal main pulmonary arteries with emboli extending into all 5  segmental pulmonary artery branches. Thrombus burden slightly greater on  the right. RV to LV ratio slightly elevated 1.41 suggesting mild right  ventricular heart strain. There is mild cardiomegaly with a left  subclavian cardiac pacer device. Trace pericardial fluid. No pleural  effusions. No thoracic aortic aneurysm or dissection identified. No  pathologic intrathoracic or axillary  lymphadenopathy. Fat necrosis  suspected within the superior left chest wall.     Postoperative changes of the stomach. Small hiatal hernia.  Cholecystectomy clips.     Calcified granuloma within the left upper lobe. Scattered Groundglass  opacities suspicious for multifocal pneumonia versus ischemia. No  pneumothorax. No discrete endobronchial lesion.     Mild degenerative change of the thoracic spine       Impression:      1. Diffuse bilateral pulmonary emboli involving the distal main  pulmonary arteries extending into the subsegmental branches. Mild right  ventricular heart strain with an RV to LV ratio measuring 1.41. Findings  called to Dr. Blue in the ER at 3:30 PM on 12/20/2021  2. Scattered groundglass opacities may represent multifocal pneumonia  and/or ischemic changes of the parenchyma.  This report was finalized on 12/20/2021 15:38 by Dr. Tati Troy MD.        Results for orders placed during the hospital encounter of 12/20/21    Adult Transthoracic Echo Complete W/ Cont if Necessary Per Protocol    Interpretation Summary  · Left ventricular systolic function is normal. Left ventricular ejection fraction appears to be 56 - 60%.  · The right ventricular cavity is mildly dilated. Normal right ventricular systolic function noted.  · Mild tricuspid valve regurgitation is present. Estimated right ventricular systolic pressure from tricuspid regurgitation is moderately elevated (45-55 mmHg).  · There is a small (<1cm) pericardial effusion.    Intake/Output  No intake or output data in the 24 hours ending 12/21/21 1259    Scheduled Meds  DULoxetine, 90 mg, Oral, Nightly  gabapentin, 300 mg, Oral, TID  [START ON 12/22/2021] pantoprazole, 40 mg, Oral, QAM  rOPINIRole, 1 mg, Oral, BID  sodium chloride, 10 mL, Intravenous, Q12H  topiramate, 25 mg, Oral, Daily        I have reviewed the patient current medications.     Assessment/Plan     Active Hospital Problems    Diagnosis    • **Pulmonary embolism,  bilateral (HCC)    • Acute deep vein thrombosis (DVT) of left lower extremity (HCC)      Extensive DVT vis in CFV, SFJ, DFV, prox-dist SFV, pop, PTV and peroneals of LLE.     • Microcytic anemia    • Tobacco abuse    • Chronic back pain    • Obesity, Class III, BMI 40-49.9 (morbid obesity) (HCC)      Plan:  1.  To ER 12/20 with shortness of breath worse with exertion.  She saw her PCP on 12/19 and noted shortness of breath when ambulating to the office.  The following day shortness of breath worse with minimal exertion and feeling weak and tired.  She denied fever, chills, but reported nonproductive cough.  She denied chest pain or palpitations.  No lower extremity edema noted.  Hemoglobin 7.4    2.  CT angiogram of the chest 12/20 diffuse bilateral pulmonary emboli involving the distal main pulmonary arteries extending into subsegmental branches.  Mild right ventricular heart strain with RV to LV ratio measuring 1.41.  Scattered groundglass opacities may represent pneumonia or ischemic changes.  Heparin bolus and started on heparin drip.  Plan to transition to Eliquis.  Patient is not hypoxic and maintaining saturation 96% on room air.    3.  Echocardiogram 12/21 ejection fraction 56-60%.  Right ventricular cavity mildly dilated.  Normal right ventricular systolic function.  Mild tricuspid regurgitation.  RVSP moderately elevated 45-55.  Small less than 1 cm pericardial effusion.    4.  Venous Dopplers positive for extensive DVT see v,sa today, the AFP, proximal distal SFV, popliteal, PTV and peroneal's left lower extremity.  Continue heparin drip.    5.  Cardiology consulted for pulmonary emboli with abnormal RV LV ratio.  No plans for EKOS as patient anemic with hemoglobin 6.8 requiring transfusion.  Feel that procedure intervention would not be safe.    6.  Microcytic, iron deficiency anemia.  Hemoglobin 7.4 on admission with hemoglobin 6.8 today.  Transfuse 1 unit packed red blood cells and repeat hemoglobin  postop.  Fecal occult blood negative.  Further work-up for microcytic anemia as an outpatient with hematology consult as outpatient.    7.  Iron 10, saturation 2%.  Will order IV Venofer.  She has history of gastric sleeve resection.    8.  Lipid panel LDL 34.  Hemoglobin A1c 5.3.    9.  Discussed smoking cessation.    10.  Home medications reviewed.  Resumed if appropriate.    CODE STATUS/advance care planning: Full code  Patient surrogate decision-maker is her sister, Mari    The above documentation resulted from a face-to-face encounter by me Susan LEIJA, [facility].    Discharge Planning: I expect patient to be discharged to home in 1-2 days.    Electronically signed by HELADIO Nolan, 12/21/2021, 12:59 CST.

## 2021-12-22 VITALS
HEART RATE: 78 BPM | DIASTOLIC BLOOD PRESSURE: 71 MMHG | HEIGHT: 63 IN | BODY MASS INDEX: 46.07 KG/M2 | RESPIRATION RATE: 16 BRPM | TEMPERATURE: 97.4 F | SYSTOLIC BLOOD PRESSURE: 102 MMHG | OXYGEN SATURATION: 93 % | WEIGHT: 260 LBS

## 2021-12-22 LAB
ANION GAP SERPL CALCULATED.3IONS-SCNC: 9 MMOL/L (ref 5–15)
APTT PPP: 113.4 SECONDS (ref 24.1–35)
APTT PPP: 39.4 SECONDS (ref 24.1–35)
APTT PPP: 60.2 SECONDS (ref 24.1–35)
BH BB BLOOD EXPIRATION DATE: NORMAL
BH BB BLOOD TYPE BARCODE: 6200
BH BB DISPENSE STATUS: NORMAL
BH BB PRODUCT CODE: NORMAL
BH BB UNIT NUMBER: NORMAL
BUN SERPL-MCNC: 11 MG/DL (ref 6–20)
BUN/CREAT SERPL: 12.8 (ref 7–25)
CALCIUM SPEC-SCNC: 8 MG/DL (ref 8.6–10.5)
CHLORIDE SERPL-SCNC: 108 MMOL/L (ref 98–107)
CO2 SERPL-SCNC: 23 MMOL/L (ref 22–29)
CREAT SERPL-MCNC: 0.86 MG/DL (ref 0.57–1)
CROSSMATCH INTERPRETATION: NORMAL
DEPRECATED RDW RBC AUTO: 59.8 FL (ref 37–54)
ERYTHROCYTE [DISTWIDTH] IN BLOOD BY AUTOMATED COUNT: 22.8 % (ref 12.3–15.4)
FOLATE SERPL-MCNC: 5.92 NG/ML (ref 4.78–24.2)
GFR SERPL CREATININE-BSD FRML MDRD: 70 ML/MIN/1.73
GLUCOSE SERPL-MCNC: 101 MG/DL (ref 65–99)
HCT VFR BLD AUTO: 26.2 % (ref 34–46.6)
HCT VFR BLD AUTO: 28 % (ref 34–46.6)
HGB BLD-MCNC: 7.3 G/DL (ref 12–15.9)
HGB BLD-MCNC: 7.8 G/DL (ref 12–15.9)
MCH RBC QN AUTO: 20.9 PG (ref 26.6–33)
MCHC RBC AUTO-ENTMCNC: 27.9 G/DL (ref 31.5–35.7)
MCV RBC AUTO: 75.1 FL (ref 79–97)
PLATELET # BLD AUTO: 262 10*3/MM3 (ref 140–450)
PMV BLD AUTO: ABNORMAL FL
POTASSIUM SERPL-SCNC: 3.8 MMOL/L (ref 3.5–5.2)
RBC # BLD AUTO: 3.49 10*6/MM3 (ref 3.77–5.28)
RETICS # AUTO: 0.07 10*6/MM3 (ref 0.02–0.13)
RETICS/RBC NFR AUTO: 1.94 % (ref 0.7–1.9)
SODIUM SERPL-SCNC: 140 MMOL/L (ref 136–145)
UNIT  ABO: NORMAL
UNIT  RH: NORMAL
VIT B12 BLD-MCNC: 170 PG/ML (ref 211–946)
WBC NRBC COR # BLD: 5.11 10*3/MM3 (ref 3.4–10.8)

## 2021-12-22 PROCEDURE — 85027 COMPLETE CBC AUTOMATED: CPT | Performed by: NURSE PRACTITIONER

## 2021-12-22 PROCEDURE — 85730 THROMBOPLASTIN TIME PARTIAL: CPT | Performed by: INTERNAL MEDICINE

## 2021-12-22 PROCEDURE — 82746 ASSAY OF FOLIC ACID SERUM: CPT | Performed by: INTERNAL MEDICINE

## 2021-12-22 PROCEDURE — 80048 BASIC METABOLIC PNL TOTAL CA: CPT | Performed by: NURSE PRACTITIONER

## 2021-12-22 PROCEDURE — 82607 VITAMIN B-12: CPT | Performed by: INTERNAL MEDICINE

## 2021-12-22 PROCEDURE — 85045 AUTOMATED RETICULOCYTE COUNT: CPT | Performed by: INTERNAL MEDICINE

## 2021-12-22 PROCEDURE — 85014 HEMATOCRIT: CPT | Performed by: NURSE PRACTITIONER

## 2021-12-22 PROCEDURE — 85018 HEMOGLOBIN: CPT | Performed by: NURSE PRACTITIONER

## 2021-12-22 PROCEDURE — 25010000002 ONDANSETRON PER 1 MG: Performed by: INTERNAL MEDICINE

## 2021-12-22 PROCEDURE — 99222 1ST HOSP IP/OBS MODERATE 55: CPT | Performed by: INTERNAL MEDICINE

## 2021-12-22 PROCEDURE — 25010000002 IRON SUCROSE PER 1 MG: Performed by: NURSE PRACTITIONER

## 2021-12-22 PROCEDURE — 25010000002 HEPARIN (PORCINE) PER 1000 UNITS: Performed by: INTERNAL MEDICINE

## 2021-12-22 RX ORDER — APIXABAN 5 MG (74)
5 KIT ORAL TAKE AS DIRECTED
Qty: 74 TABLET | Refills: 0 | Status: SHIPPED | OUTPATIENT
Start: 2021-12-22 | End: 2022-01-10

## 2021-12-22 RX ADMIN — GABAPENTIN 300 MG: 300 CAPSULE ORAL at 08:19

## 2021-12-22 RX ADMIN — ONDANSETRON 4 MG: 2 INJECTION INTRAMUSCULAR; INTRAVENOUS at 08:25

## 2021-12-22 RX ADMIN — ROPINIROLE HYDROCHLORIDE 1 MG: 1 TABLET, FILM COATED ORAL at 08:19

## 2021-12-22 RX ADMIN — PANTOPRAZOLE SODIUM 40 MG: 40 TABLET, DELAYED RELEASE ORAL at 05:57

## 2021-12-22 RX ADMIN — HEPARIN SODIUM 1700 UNITS/HR: 10000 INJECTION, SOLUTION INTRAVENOUS at 00:40

## 2021-12-22 RX ADMIN — IRON SUCROSE 200 MG: 20 INJECTION, SOLUTION INTRAVENOUS at 12:15

## 2021-12-22 RX ADMIN — ACETAMINOPHEN 650 MG: 325 TABLET ORAL at 00:42

## 2021-12-22 RX ADMIN — GABAPENTIN 300 MG: 300 CAPSULE ORAL at 15:29

## 2021-12-22 RX ADMIN — APIXABAN 10 MG: 5 TABLET, FILM COATED ORAL at 09:40

## 2021-12-22 RX ADMIN — OXYCODONE AND ACETAMINOPHEN 1 TABLET: 7.5; 325 TABLET ORAL at 15:29

## 2021-12-22 RX ADMIN — TOPIRAMATE 25 MG: 25 TABLET, FILM COATED ORAL at 08:19

## 2021-12-22 RX ADMIN — SODIUM CHLORIDE, PRESERVATIVE FREE 10 ML: 5 INJECTION INTRAVENOUS at 08:25

## 2021-12-22 NOTE — PAYOR COMM NOTE
"12/22/21 [facility]    JENIFER SUMMARY    [phone]          Yecenia Carlson (51 y.o. Female)             Date of Birth Social Security Number Address Home Phone MRN    1970  [address] [address] [phone] [id number]    Rastafari Marital Status             Other        Admission Date Admission Type Admitting Provider Attending Provider Department, Room/Bed    12/20/21 Emergency Cade Nelson MD Thompson, Benjamin H, MD [facility] 4B, 444/1    Discharge Date Discharge Disposition Discharge Destination           Home or Self Care              Attending Provider: Cade Nelson MD    Allergies: Erythromycin, Penicillins, Ampicillin    Isolation: None   Infection: None   Code Status: CPR   Advance Care Planning Activity    Ht: 160 cm (63\")   Wt: 118 kg (260 lb)    Admission Cmt: None   Principal Problem: Pulmonary embolism, bilateral (HCC) [I26.99]                 Active Insurance as of 12/20/2021     Primary Coverage     Payor Plan Insurance Group Employer/Plan Group    [facility] MEDICARE REPLACEMENT [facility] DUAL COMPLETE MEDICARE REPLACEMENT [id number]     Payor Plan Address Payor Plan Phone Number Payor Plan Fax Number Effective Dates    [address] [phone]  1/1/2021 - None Entered    [facility] [id number]       Subscriber Name Subscriber Birth Date Member ID       YECENIA CARLSON 1970 [id number]           Secondary Coverage     Payor Plan Insurance Group Employer/Plan Group    [place]      Payor Plan Address Payor Plan Phone Number Payor Plan Fax Number Effective Dates    [address] [phone]  2/27/2017 - None Entered    [facility] [id number]       Subscriber Name Subscriber Birth Date Member ID       YECENIA CARLSON 1970 [id number]                 Emergency Contacts      (Rel.) Home Phone Work Phone Mobile Phone    Mari Santana (Sister) -- " -- [phone]               Discharge Summary      Susan Nielson, APRN at 12/22/21 1404              [facility] Medicine Services  DISCHARGE SUMMARY     Date of Admission: 12/20/2021  Date of Discharge:  12/22/2021  Primary Care Physician: Sushil Nash MD    Presenting Problem/History of Present Illness:  Pulmonary embolism, bilateral (HCC) [I26.99]     Discharge Diagnoses:  Active Hospital Problems    Diagnosis    • **Pulmonary embolism, bilateral (HCC)    • Acute deep vein thrombosis (DVT) of left lower extremity (HCC)      Extensive DVT vis in CFV, SFJ, DFV, prox-dist SFV, pop, PTV and peroneals of LLE.     • Iron deficiency anemia    • Microcytic anemia    • Tobacco abuse    • Chronic back pain    • Obesity, Class III, BMI 40-49.9 (morbid obesity) (HCC)      Chief Complaint on Day of Discharge: No complaints.  Lying in bed.    History of Present Illness on Day of Discharge:   Lying in bed.  No oxygen in use.  She feels better today.  Reports less shortness of breath when up to the bathroom.  Denies nausea, vomiting or abdominal pain.  Denies chest pain or palpitations.  Denies pain in lower extremity.  She has received IV Venofer for 2 doses.  Hemoglobin 7.8 today.  No bright red bleeding per rectum or dark tarry stools.  Dr. Espana evaluated today and cleared for discharge to follow-up with Dr. Goldberg for iron deficiency anemia.    Consults:   1.  Dr. Tovar, cardiology  2.  Dr. Goldberg, hematology    Procedures Performed: None    Pertinent Test Results:   Results for orders placed during the hospital encounter of 12/20/21    Adult Transthoracic Echo Complete W/ Cont if Necessary Per Protocol    Interpretation Summary  · Left ventricular systolic function is normal. Left ventricular ejection fraction appears to be 56 - 60%.  · The right ventricular cavity is mildly dilated. Normal right ventricular systolic function noted.  · Mild tricuspid valve regurgitation is  present. Estimated right ventricular systolic pressure from tricuspid regurgitation is moderately elevated (45-55 mmHg).  · There is a small (<1cm) pericardial effusion.    Laboratory Data Last 7 Days:  Results from last 7 days   Lab Units 12/22/21  1232 12/22/21  0302 12/21/21  1511 12/21/21  0613 12/20/21  1320   WBC 10*3/mm3  --  5.11  --  5.33 7.16   HEMOGLOBIN g/dL 7.8* 7.3* 8.5* 6.8* 7.4*   HEMATOCRIT % 28.0* 26.2* 29.0* 24.2* 26.3*   PLATELETS 10*3/mm3  --  262  --  273 291     Results from last 7 days   Lab Units 12/22/21  0302 12/21/21  0613 12/21/21  0613 12/20/21  1321 12/20/21  1321   SODIUM mmol/L 140  --  143  --  140   POTASSIUM mmol/L 3.8  --  3.4*  --  3.8   CHLORIDE mmol/L 108*  --  108*  --  107   CO2 mmol/L 23.0  --  23.0  --  22.0   BUN mg/dL 11  --  8  --  9   CREATININE mg/dL 0.86  --  0.69  --  0.67   GLUCOSE mg/dL 101*   < > 90   < > 87   CALCIUM mg/dL 8.0*  --  8.4*  --  8.6   ALT (SGPT) U/L  --   --   --   --  9    < > = values in this interval not displayed.     Laboratory Data Last 7 Days:    Lab Results (last 7 days)     Procedure Component Value Units Date/Time    Hemoglobin & Hematocrit, Blood [[id number]]  (Abnormal) Collected: 12/22/21 1232    Specimen: Blood Updated: 12/22/21 1238     Hemoglobin 7.8 g/dL      Hematocrit 28.0 %     aPTT [[id number]]  (Abnormal) Collected: 12/22/21 1012    Specimen: Blood Updated: 12/22/21 1058     PTT 60.2 seconds     Vitamin B12 [[id number]] Collected: 12/22/21 1012    Specimen: Blood Updated: 12/22/21 1044    Folate [[id number]] Collected: 12/22/21 1012    Specimen: Blood Updated: 12/22/21 1044    Reticulocytes [[id number]]  (Abnormal) Collected: 12/22/21 0302    Specimen: Blood Updated: 12/22/21 0804     Reticulocyte % 1.94 %      Reticulocyte Absolute 0.0685 10*6/mm3     Basic Metabolic Panel [[id number]]  (Abnormal) Collected: 12/22/21 0302    Specimen: Blood Updated: 12/22/21 0336     Glucose 101 mg/dL      BUN 11 mg/dL      Creatinine 0.86 mg/dL       Sodium 140 mmol/L      Potassium 3.8 mmol/L      Chloride 108 mmol/L      CO2 23.0 mmol/L      Calcium 8.0 mg/dL      eGFR Non African Amer 70 mL/min/1.73      BUN/Creatinine Ratio 12.8     Anion Gap 9.0 mmol/L     Narrative:      GFR Normal >60  Chronic Kidney Disease <60  Kidney Failure <15      aPTT [[id number]]  (Abnormal) Collected: 12/22/21 0302    Specimen: Blood Updated: 12/22/21 0330     .4 seconds     CBC (No Diff) [[id number]]  (Abnormal) Collected: 12/22/21 0302    Specimen: Blood Updated: 12/22/21 0320     WBC 5.11 10*3/mm3      RBC 3.49 10*6/mm3      Hemoglobin 7.3 g/dL      Hematocrit 26.2 %      MCV 75.1 fL      MCH 20.9 pg      MCHC 27.9 g/dL      RDW 22.8 %      RDW-SD 59.8 fl      MPV --     Comment: Unable to calculate         Platelets 262 10*3/mm3     aPTT [[id number]]  (Abnormal) Collected: 12/21/21 2144    Specimen: Blood Updated: 12/21/21 2301     PTT 92.5 seconds     Hemoglobin & Hematocrit, Blood [[id number]]  (Abnormal) Collected: 12/21/21 1511    Specimen: Blood Updated: 12/21/21 1538     Hemoglobin 8.5 g/dL      Hematocrit 29.0 %     aPTT [[id number]]  (Abnormal) Collected: 12/21/21 1343    Specimen: Blood Updated: 12/21/21 1422     PTT 59.1 seconds     Basic Metabolic Panel [[id number]]  (Abnormal) Collected: 12/21/21 0613    Specimen: Blood Updated: 12/21/21 0653     Glucose 90 mg/dL      BUN 8 mg/dL      Creatinine 0.69 mg/dL      Sodium 143 mmol/L      Potassium 3.4 mmol/L      Chloride 108 mmol/L      CO2 23.0 mmol/L      Calcium 8.4 mg/dL      eGFR Non African Amer 90 mL/min/1.73      BUN/Creatinine Ratio 11.6     Anion Gap 12.0 mmol/L     Narrative:      GFR Normal >60  Chronic Kidney Disease <60  Kidney Failure <15      Lipid Panel [[id number]] Collected: 12/21/21 0613    Specimen: Blood Updated: 12/21/21 0653     Total Cholesterol 92 mg/dL      Triglycerides 85 mg/dL      HDL Cholesterol 41 mg/dL      LDL Cholesterol  34 mg/dL      VLDL Cholesterol 17 mg/dL      LDL/HDL  Ratio 0.83    Narrative:      Cholesterol Reference Ranges  (U.S. Department of Health and Human Services ATP III Classifications)    Desirable          <200 mg/dL  Borderline High    200-239 mg/dL  High Risk          >240 mg/dL      Triglyceride Reference Ranges  (U.S. Department of Health and Human Services ATP III Classifications)    Normal           <150 mg/dL  Borderline High  150-199 mg/dL  High             200-499 mg/dL  Very High        >500 mg/dL    HDL Reference Ranges  (U.S. Department of Health and Human Services ATP III Classifcations)    Low     <40 mg/dl (major risk factor for CHD)  High    >60 mg/dl ('negative' risk factor for CHD)        LDL Reference Ranges  (U.S. Department of Health and Human Services ATP III Classifcations)    Optimal          <100 mg/dL  Near Optimal     100-129 mg/dL  Borderline High  130-159 mg/dL  High             160-189 mg/dL  Very High        >189 mg/dL    CBC (No Diff) [[id number]]  (Abnormal) Collected: 12/21/21 0613    Specimen: Blood Updated: 12/21/21 0645     WBC 5.33 10*3/mm3      RBC 3.32 10*6/mm3      Hemoglobin 6.8 g/dL      Hematocrit 24.2 %      MCV 72.9 fL      MCH 20.5 pg      MCHC 28.1 g/dL      RDW 22.9 %      RDW-SD 57.8 fl      Platelets 273 10*3/mm3     aPTT [[id number]]  (Abnormal) Collected: 12/21/21 0613    Specimen: Blood Updated: 12/21/21 0634     PTT 64.0 seconds     aPTT [[id number]]  (Abnormal) Collected: 12/20/21 2242    Specimen: Blood Updated: 12/20/21 2341     PTT 95.7 seconds     Iron Profile [[id number]]  (Abnormal) Collected: 12/20/21 1632    Specimen: Blood Updated: 12/20/21 2056     Iron 10 mcg/dL      Iron Saturation 2 %      Transferrin 302 mg/dL      TIBC 450 mcg/dL     Hemoglobin A1c [[id number]]  (Normal) Collected: 12/20/21 1320    Specimen: Blood Updated: 12/20/21 1829     Hemoglobin A1C 5.30 %     Narrative:      Hemoglobin A1C Ranges:    Increased Risk for Diabetes  5.7% to 6.4%  Diabetes                     >= 6.5%  Diabetic Goal                 < 7.0%    Troponin [[id number]]  (Normal) Collected: 12/20/21 1632    Specimen: Blood Updated: 12/20/21 1703     Troponin T <0.010 ng/mL     Narrative:      Troponin T Reference Range:  <= 0.03 ng/mL-   Negative for AMI  >0.03 ng/mL-     Abnormal for myocardial necrosis.  Clinicians would have to utilize clinical acumen, EKG, Troponin and serial changes to determine if it is an Acute Myocardial Infarction or myocardial injury due to an underlying chronic condition.       Results may be falsely decreased if patient taking Biotin.      POC Occult Blood Stool [[id number]]  (Normal) Collected: 12/20/21 1647    Specimen: Stool Updated: 12/20/21 1647     Fecal Occult Blood Negative     Lot Number [id number]     Expiration Date 4/30/22     DEVELOPER LOT NUMBER [id number]     DEVELOPER EXPIRATION DATE 4/30/22     Positive Control Positive     Negative Control Negative    aPTT [[id number]]  (Abnormal) Collected: 12/20/21 1321    Specimen: Blood Updated: 12/20/21 1627     PTT 35.1 seconds     COVID-19,[facility] IN-HOUSE,Nasal Swab No Transport Media 3-4 HR TAT - Swab, Nasal Cavity [[id number]]  (Normal) Collected: 12/20/21 1320    Specimen: Swab from Nasal Cavity Updated: 12/20/21 1420     COVID19 Not Detected    Narrative:      Fact sheet for providers: https://www.fda.gov/media/045420/download     Fact sheet for patients: https://www.fda.gov/media/315389/download    Test performed by PCR.    Consider negative results in combination with clinical observations, patient history, and epidemiological information.    Comprehensive Metabolic Panel [[id number]]  (Abnormal) Collected: 12/20/21 1321    Specimen: Blood Updated: 12/20/21 1402     Glucose 87 mg/dL      BUN 9 mg/dL      Creatinine 0.67 mg/dL      Sodium 140 mmol/L      Potassium 3.8 mmol/L      Comment: Slight hemolysis detected by analyzer. Results may be affected.        Chloride 107 mmol/L      CO2 22.0 mmol/L      Calcium 8.6 mg/dL      Total Protein 6.3 g/dL      Albumin  3.20 g/dL      ALT (SGPT) 9 U/L      AST (SGOT) 14 U/L      Alkaline Phosphatase 76 U/L      Total Bilirubin 0.2 mg/dL      eGFR Non African Amer 93 mL/min/1.73      Globulin 3.1 gm/dL      A/G Ratio 1.0 g/dL      BUN/Creatinine Ratio 13.4     Anion Gap 11.0 mmol/L     Narrative:      GFR Normal >60  Chronic Kidney Disease <60  Kidney Failure <15      Manual Differential [[id number]]  (Abnormal) Collected: 12/20/21 1320    Specimen: Blood Updated: 12/20/21 1402     Neutrophil % 64.6 %      Lymphocyte % 27.3 %      Monocyte % 4.0 %      Eosinophil % 4.0 %      Neutrophils Absolute 4.63 10*3/mm3      Lymphocytes Absolute 1.95 10*3/mm3      Monocytes Absolute 0.29 10*3/mm3      Eosinophils Absolute 0.29 10*3/mm3      nRBC 2.0 /100 WBC      Anisocytosis Mod/2+     Hypochromia Mod/2+     Microcytes Slight/1+     Poikilocytes Slight/1+     Polychromasia Large/3+     WBC Morphology Normal     Giant Platelets Mod/2+    CBC & Differential [[id number]]  (Abnormal) Collected: 12/20/21 1320    Specimen: Blood Updated: 12/20/21 1402    Narrative:      The following orders were created for panel order CBC & Differential.  Procedure                               Abnormality         Status                     ---------                               -----------         ------                     CBC Auto Differential[[id number]]        Abnormal            Final result                 Please view results for these tests on the individual orders.    CBC Auto Differential [[id number]]  (Abnormal) Collected: 12/20/21 1320    Specimen: Blood Updated: 12/20/21 1402     WBC 7.16 10*3/mm3      RBC 3.59 10*6/mm3      Hemoglobin 7.4 g/dL      Hematocrit 26.3 %      MCV 73.3 fL      MCH 20.6 pg      MCHC 28.1 g/dL      RDW 23.2 %      RDW-SD 57.7 fl      Platelets 291 10*3/mm3     Narrative:      ckd    BNP [[id number]]  (Abnormal) Collected: 12/20/21 1321    Specimen: Blood Updated: 12/20/21 1359     proBNP 5,629.0 pg/mL     Narrative:      Among  patients with dyspnea, NT-proBNP is highly sensitive for the detection of acute congestive heart failure. In addition NT-proBNP of <300 pg/ml effectively rules out acute congestive heart failure with 99% negative predictive value.    Results may be falsely decreased if patient taking Biotin.      Troponin [[id number]]  (Normal) Collected: 12/20/21 1321    Specimen: Blood Updated: 12/20/21 1358     Troponin T <0.010 ng/mL     Narrative:      Troponin T Reference Range:  <= 0.03 ng/mL-   Negative for AMI  >0.03 ng/mL-     Abnormal for myocardial necrosis.  Clinicians would have to utilize clinical acumen, EKG, Troponin and serial changes to determine if it is an Acute Myocardial Infarction or myocardial injury due to an underlying chronic condition.       Results may be falsely decreased if patient taking Biotin.      Magnesium [[id number]]  (Normal) Collected: 12/20/21 1321    Specimen: Blood Updated: 12/20/21 1357     Magnesium 2.0 mg/dL     Protime-INR [[id number]]  (Normal) Collected: 12/20/21 1321    Specimen: Blood Updated: 12/20/21 1356     Protime 12.8 Seconds      INR 1.00    D-dimer, Quantitative [[id number]]  (Abnormal) Collected: 12/20/21 1321    Specimen: Blood Updated: 12/20/21 1356     D-Dimer, Quantitative 1.22 mg/L (FEU)     Narrative:      Reference Range is 0-0.50 mg/L FEU. However, results <0.50 mg/L FEU tends to rule out DVT or PE. Results >0.50 mg/L FEU are not useful in predicting absence or presence of DVT or PE.          Imaging Results (All)     Procedure Component Value Units Date/Time    US Venous Doppler Lower Extremity Bilateral (duplex) [[id number]] Collected: 12/21/21 1551     Updated: 12/21/21 1556    Narrative:      History: Swelling       Impression:      Impression:  1. There is evidence of extensive deep venous thrombosis in the left  lower extremity.  2. Further imaging/evaluation may be warranted.     Comments: Bilateral lower extremity venous duplex exam was performed  using color  Doppler flow, Doppler waveform analysis, and grayscale  imaging, with and without compression. There is evidence of deep venous  thrombosis in the common femoral, profunda femoris, superficial femoral,  popliteal, peroneal, and posterior tibial veins in the left lower  extremity. There is no evidence of deep venous thrombosis of the right  lower extremity. There is superficial thrombus in the saphenofemoral  junction in the left lower extremity.        This report was finalized on 12/21/2021 15:53 by Dr. Celestino Vasquez MD.    CT Angiogram Chest [[id number]] Collected: 12/20/21 1531     Updated: 12/20/21 1541    Narrative:      CT ANGIOGRAM CHEST- 12/20/2021 3:14 PM CST     HISTORY: dyspnea, elevated dimer      COMPARISON: 2/27/2017     DOSE LENGTH PRODUCT: 532 mGy cm. Automated exposure control was also  utilized to decrease patient radiation dose.     TECHNIQUE: Axial images the chest are obtained following IV contrast.  2-D and maximal intensity projection images are reconstructed and  reviewed.     FINDINGS:  There are scattered bilateral pulmonary emboli involving the  distal main pulmonary arteries with emboli extending into all 5  segmental pulmonary artery branches. Thrombus burden slightly greater on  the right. RV to LV ratio slightly elevated 1.41 suggesting mild right  ventricular heart strain. There is mild cardiomegaly with a left  subclavian cardiac pacer device. Trace pericardial fluid. No pleural  effusions. No thoracic aortic aneurysm or dissection identified. No  pathologic intrathoracic or axillary lymphadenopathy. Fat necrosis  suspected within the superior left chest wall.     Postoperative changes of the stomach. Small hiatal hernia.  Cholecystectomy clips.     Calcified granuloma within the left upper lobe. Scattered Groundglass  opacities suspicious for multifocal pneumonia versus ischemia. No  pneumothorax. No discrete endobronchial lesion.     Mild degenerative change of the thoracic  spine       Impression:      1. Diffuse bilateral pulmonary emboli involving the distal main  pulmonary arteries extending into the subsegmental branches. Mild right  ventricular heart strain with an RV to LV ratio measuring 1.41. Findings  called to Dr. Blue in the ER at 3:30 PM on 12/20/2021  2. Scattered groundglass opacities may represent multifocal pneumonia  and/or ischemic changes of the parenchyma.  This report was finalized on 12/20/2021 15:38 by Dr. Tati Troy MD.        Hospital Course  Patient is a 51 y.o. female presented to [facility] emergency room 12/20/2021 with increasing shortness of breath.  She has a history of tobacco use, morbid obesity, TIA versus stroke.  She has a pacemaker for unknown reasons.  Patient received [employer] Covid vaccine in April of this year and reported difficulty since that time.  Patient has a right facial droop and difficulty with her speech since May or June of this year.  On saw her primary care provider earlier for 3-month checkup was in her usual state of health.  However, she noted she was winded more than normal on the way to the doctor's office but felt she was at baseline with exertional shortness of breath.  The following day she noted shortness of breath was much worse with minimal exertion and patient had reported feeling worse since that time.  She denied fever, chills, chest pain or palpitations.  She denied lower extremity edema.  She did report chronic charley horses in her legs left greater than right.  She reported infrequent nonproductive cough.  Patient's sister works at Dr. Nash's office and has been monitoring patient's anemia.  Patient was noted to have worsening anemia with hemoglobin 7.7 with previous hemoglobin 8.9.  Patient denied any bright red bleeding per rectum or dark tarry stools.  Sodium 140, potassium 3.8, creatinine 0.67, WBC 7.9, hemoglobin 7.4, hematocrit 26.3, platelets 291,000, proBNP 5629, troponin  negative, D-dimer 1.22.  CT angiogram chest reported diffuse bilateral pulmonary emboli involving the distal main pulmonary arteries extending into subsegmental branches.  Mild right ventricular heart strain with RV to LV ratio measuring 1.41.  Scattered groundglass opacities may represent multifocal pneumonia or ischemic changes of the parenchyma.  Heparin bolus and drip started in the emergency room.    She was admitted to the medical floor with acute bilateral pulmonary emboli, microcytic anemia.  Heparin drip continued.  Case discussed with cardiology to determine if patient would be a candidate for EKOS procedure.  Echocardiogram 12/21 noted ejection fraction 56 to 60%.  Right ventricular cavity mildly dilated.  Normal right ventricular systolic function.  Mild tricuspid regurgitation.  RVSP moderately elevated 45-55.  Small less than 1 cm pericardial effusion.  Venous Dopplers positive for  extensive DVT see v,sa today, the AFP, proximal distal SFV, popliteal, PTV and peroneal's left lower extremity.   Heparin drip continued.    Dr. Tovar consulted and reviewed echocardiogram and CT scan.  Even though patient has bilateral pulmonary emboli with evidence of right heart strain she is also noted to be anemic with decreased hemoglobin without obvious source of blood loss.  This likely represents iron deficiency anemia but with uncertainty patient would not be considered a candidate for invasive procedure at this time.    Microcytic iron deficiency anemia.  Iron 10, saturation 2%.  Hemoglobin 7.4 on admission and trended down to 6.8 on 12/21.  She was transfused 1 unit packed red blood cells.  Hemoglobin 8.5 posttransfusion.  Again no signs of bright red bleeding per rectum or dark tarry stool and fecal occult blood remain negative.  Hemoglobin trended down to 7.3 on 12/22 with repeat hemoglobin 7.8 and hematocrit 28.  IV Venofer daily for 2 doses given during hospital stay.  Patient has a history of gastric  "resection and may not absorb oral iron and will likely need IV Venofer transfusions as an outpatient.  Lovenox transitioned to Eliquis 10 mg orally twice daily for 7 days beginning 12/22/2020 then Eliquis 5 mg orally twice daily beginning 12/29/2021.     Hematology consulted for microcytic anemia and she was seen by Dr. Espana with recommendations for hypercoagulable work-up on outpatient basis, transfuse packed cells if hemoglobin less than 7.  Okay to transition heparin to Eliquis.  Patient will follow up with Dr. Goldberg, hematology with first available appointment.  Office will contact patient with date and time of appointment.     Lipid panel LDL 34, hemoglobin A1c 5.3.  Discussed smoking cessation with patient.    On 12/22/2021 she is stable for discharge.  She presented with bilateral pulmonary emboli and extensive DVT left lower extremity.  She was started on heparin drip and evaluated by cardiology and not deemed a candidate for invasive procedure.  Heparin transitioned to Eliquis and starter pack ordered at discharge.  She will follow-up with Dr. Nash on 12/29/2021 CBC.  Discussed with her sister, Mari who also works in 's office.  Dr. Goldberg's office will contact patient with date and time of appointment.    Physical Exam on Discharge:  /71 (BP Location: Left arm, Patient Position: Lying)   Pulse 78   Temp 97.4 °F (36.3 °C) (Axillary)   Resp 16   Ht 160 cm (63\")   Wt 118 kg (260 lb)   SpO2 93%   Breastfeeding No   BMI 46.06 kg/m²   Physical Exam  Vitals and nursing note reviewed.   Constitutional:       Appearance: She is obese.      Comments: Lying in bed.  No oxygen use.  No family in room.   HENT:      Head: Normocephalic and atraumatic.      Nose: No congestion or rhinorrhea.      Mouth/Throat:      Pharynx: Oropharynx is clear. No oropharyngeal exudate or posterior oropharyngeal erythema.   Eyes:      Extraocular Movements: Extraocular movements intact.      Pupils: Pupils " are equal, round, and reactive to light.   Cardiovascular:      Rate and Rhythm: Normal rate and regular rhythm.      Heart sounds: No murmur heard.       Comments: Normal sinus rhythm 70s on telemetry.  Pulmonary:      Breath sounds: No wheezing, rhonchi or rales.      Comments: No oxygen in use.  Abdominal:      Palpations: Abdomen is soft.      Tenderness: There is no abdominal tenderness.   Genitourinary:     Comments: Voiding.  Musculoskeletal:         General: No swelling or tenderness.      Cervical back: Normal range of motion and neck supple.   Skin:     General: Skin is warm and dry.   Neurological:      General: No focal deficit present.      Mental Status: She is alert and oriented to person, place, and time.   Psychiatric:         Mood and Affect: Mood normal.         Behavior: Behavior normal.         Thought Content: Thought content normal.         Judgment: Judgment normal.         Condition on Discharge: Stable    Discharge Disposition: Home with family    Discharge Diet:   Diet Instructions     Advance Diet As Tolerated            Activity at Discharge:   Activity Instructions     Activity as Tolerated            Discharge Care Plan / Instructions:   1.  For worsening shortness of breath seek medical attention.  2.  Eliquis 10 mg orally twice daily for 7 days begin 12/22-21 then Eliquis 5 mg twice daily begin 12/29/2021.  Eliquis starter pack filled by [facility] to beds at discharge.  3.  Follow-up with Dr. Nash 12/29/2021 with CBC.  4.  Follow-up with Dr. Goldberger for iron deficiency anemia.  Office will contact patient with date and time of first available appointment.    Discharge Medications:     Discharge Medications      New Medications      Instructions Start Date   Eliquis DVT/PE Starter Pack tablet therapy pack  Generic drug: Apixaban Starter Pack   5 mg, Oral, Take As Directed         Continue These Medications      Instructions Start Date   albuterol sulfate  (90 Base) MCG/ACT  inhaler  Commonly known as: PROVENTIL HFA;VENTOLIN HFA;PROAIR HFA   2 puffs, Inhalation, Every 4 Hours PRN      albuterol (2.5 MG/3ML) 0.083% nebulizer solution  Commonly known as: PROVENTIL   2.5 mg, Nebulization, Every 4 Hours PRN      butalbital-acetaminophen-caffeine -40 MG per tablet  Commonly known as: FIORICET, ESGIC   1 tablet, Oral, Daily PRN      DULoxetine 60 MG capsule  Commonly known as: CYMBALTA   90 mg, Oral, Nightly, Pt takes a 30 mg and a 60 mg for a total of 90 mg      escitalopram 10 MG tablet  Commonly known as: LEXAPRO   10 mg, Oral, Daily      gabapentin 300 MG capsule  Commonly known as: NEURONTIN   300 mg, Oral, 3 Times Daily      meclizine 25 MG tablet  Commonly known as: ANTIVERT   25 mg, Oral, 3 Times Daily PRN      ondansetron 4 MG tablet  Commonly known as: ZOFRAN   4 mg, Oral, Every 6 Hours PRN      oxyCODONE-acetaminophen 7.5-325 MG per tablet  Commonly known as: PERCOCET   1 tablet, Oral, 3 Times Daily PRN      pantoprazole 40 MG EC tablet  Commonly known as: PROTONIX   40 mg, Oral, 2 Times Daily      rOPINIRole 1 MG tablet  Commonly known as: REQUIP   1 mg, Oral, 2 Times Daily, Take 1 hour before bedtime.      topiramate 50 MG tablet  Commonly known as: TOPAMAX   50 mg, Oral, Daily           Follow-up Appointments:    Follow-up Information     Goldberg, Amit, MD Follow up today.    Specialties: Hematology and Oncology, Oncology  Why: Office will call patient with appointment date and time.  Contact information:  [address] [place] [address]  [facility] [address]  [phone]             Sushil Nash MD. Go on 12/29/2021.    Specialty: Family Medicine  Why: @ 10:40 AM Needs CBC  Contact information:  [address]  [facility] [id number] [phone]                       Future Appointments:  No future appointments.    Test Results Pending at Discharge: None    The above documentation resulted from a face-to-face encounter by gomez LEIJA, ACNP-BC.    Electronically  signed by HELADIO Nolan, 12/22/2021, 14:53 CST.    Time: This discharge process required greater than 35 minutes for completion.    Plan discussed with Dr. Nelson, Dr. Espana, patient, and sister Mari.    Time spent in face-to-face evaluation, chart review, planning and education greater than 35 minutes.          Electronically signed by Susan Nielson APRN at 12/22/21 [id number]

## 2021-12-22 NOTE — PLAN OF CARE
Goal Outcome Evaluation:  Plan of Care Reviewed With: patient        Progress: no change  Outcome Summary: HEPARIN GTT CONTINUES AT 16 ML/HR. IV IRON GIVEN LAST NIGHT, HGB DOWN TO 7.3 THIS MORNING. PERCOCET GIVEN FOR BACK PAIN AND TYLENOL GIVEN FOR HEADACHE. PATIENT RESTING BETWEEN CARE. CONTINUE TO MONITOR.

## 2021-12-22 NOTE — DISCHARGE SUMMARY
[facility] Medicine Services  DISCHARGE SUMMARY     Date of Admission: 12/20/2021  Date of Discharge:  12/22/2021  Primary Care Physician: Sushil Nash MD    Presenting Problem/History of Present Illness:  Pulmonary embolism, bilateral (HCC) [I26.99]     Discharge Diagnoses:  Active Hospital Problems    Diagnosis    • **Pulmonary embolism, bilateral (HCC)    • Acute deep vein thrombosis (DVT) of left lower extremity (HCC)      Extensive DVT vis in CFV, SFJ, DFV, prox-dist SFV, pop, PTV and peroneals of LLE.     • Iron deficiency anemia    • Microcytic anemia    • Tobacco abuse    • Chronic back pain    • Obesity, Class III, BMI 40-49.9 (morbid obesity) (HCC)      Chief Complaint on Day of Discharge: No complaints.  Lying in bed.    History of Present Illness on Day of Discharge:   Lying in bed.  No oxygen in use.  She feels better today.  Reports less shortness of breath when up to the bathroom.  Denies nausea, vomiting or abdominal pain.  Denies chest pain or palpitations.  Denies pain in lower extremity.  She has received IV Venofer for 2 doses.  Hemoglobin 7.8 today.  No bright red bleeding per rectum or dark tarry stools.  Dr. Espana evaluated today and cleared for discharge to follow-up with Dr. Goldberg for iron deficiency anemia.    Consults:   1.  Dr. Tovar, cardiology  2.  Dr. Goldberg, hematology    Procedures Performed: None    Pertinent Test Results:   Results for orders placed during the hospital encounter of 12/20/21    Adult Transthoracic Echo Complete W/ Cont if Necessary Per Protocol    Interpretation Summary  · Left ventricular systolic function is normal. Left ventricular ejection fraction appears to be 56 - 60%.  · The right ventricular cavity is mildly dilated. Normal right ventricular systolic function noted.  · Mild tricuspid valve regurgitation is present. Estimated right ventricular systolic pressure from tricuspid regurgitation is moderately  elevated (45-55 mmHg).  · There is a small (<1cm) pericardial effusion.    Laboratory Data Last 7 Days:  Results from last 7 days   Lab Units 12/22/21  1232 12/22/21  0302 12/21/21  1511 12/21/21  0613 12/20/21  1320   WBC 10*3/mm3  --  5.11  --  5.33 7.16   HEMOGLOBIN g/dL 7.8* 7.3* 8.5* 6.8* 7.4*   HEMATOCRIT % 28.0* 26.2* 29.0* 24.2* 26.3*   PLATELETS 10*3/mm3  --  262  --  273 291     Results from last 7 days   Lab Units 12/22/21  0302 12/21/21  0613 12/21/21  0613 12/20/21  1321 12/20/21  1321   SODIUM mmol/L 140  --  143  --  140   POTASSIUM mmol/L 3.8  --  3.4*  --  3.8   CHLORIDE mmol/L 108*  --  108*  --  107   CO2 mmol/L 23.0  --  23.0  --  22.0   BUN mg/dL 11  --  8  --  9   CREATININE mg/dL 0.86  --  0.69  --  0.67   GLUCOSE mg/dL 101*   < > 90   < > 87   CALCIUM mg/dL 8.0*  --  8.4*  --  8.6   ALT (SGPT) U/L  --   --   --   --  9    < > = values in this interval not displayed.     Laboratory Data Last 7 Days:    Lab Results (last 7 days)     Procedure Component Value Units Date/Time    Hemoglobin & Hematocrit, Blood [[id number]]  (Abnormal) Collected: 12/22/21 1232    Specimen: Blood Updated: 12/22/21 1238     Hemoglobin 7.8 g/dL      Hematocrit 28.0 %     aPTT [[id number]]  (Abnormal) Collected: 12/22/21 1012    Specimen: Blood Updated: 12/22/21 1058     PTT 60.2 seconds     Vitamin B12 [[id number]] Collected: 12/22/21 1012    Specimen: Blood Updated: 12/22/21 1044    Folate [[id number]] Collected: 12/22/21 1012    Specimen: Blood Updated: 12/22/21 1044    Reticulocytes [[id number]]  (Abnormal) Collected: 12/22/21 0302    Specimen: Blood Updated: 12/22/21 0804     Reticulocyte % 1.94 %      Reticulocyte Absolute 0.0685 10*6/mm3     Basic Metabolic Panel [[id number]]  (Abnormal) Collected: 12/22/21 0302    Specimen: Blood Updated: 12/22/21 0336     Glucose 101 mg/dL      BUN 11 mg/dL      Creatinine 0.86 mg/dL      Sodium 140 mmol/L      Potassium 3.8 mmol/L      Chloride 108 mmol/L      CO2 23.0 mmol/L       Calcium 8.0 mg/dL      eGFR Non African Amer 70 mL/min/1.73      BUN/Creatinine Ratio 12.8     Anion Gap 9.0 mmol/L     Narrative:      GFR Normal >60  Chronic Kidney Disease <60  Kidney Failure <15      aPTT [[id number]]  (Abnormal) Collected: 12/22/21 0302    Specimen: Blood Updated: 12/22/21 0330     .4 seconds     CBC (No Diff) [[id number]]  (Abnormal) Collected: 12/22/21 0302    Specimen: Blood Updated: 12/22/21 0320     WBC 5.11 10*3/mm3      RBC 3.49 10*6/mm3      Hemoglobin 7.3 g/dL      Hematocrit 26.2 %      MCV 75.1 fL      MCH 20.9 pg      MCHC 27.9 g/dL      RDW 22.8 %      RDW-SD 59.8 fl      MPV --     Comment: Unable to calculate         Platelets 262 10*3/mm3     aPTT [[id number]]  (Abnormal) Collected: 12/21/21 2144    Specimen: Blood Updated: 12/21/21 2301     PTT 92.5 seconds     Hemoglobin & Hematocrit, Blood [[id number]]  (Abnormal) Collected: 12/21/21 1511    Specimen: Blood Updated: 12/21/21 1538     Hemoglobin 8.5 g/dL      Hematocrit 29.0 %     aPTT [[id number]]  (Abnormal) Collected: 12/21/21 1343    Specimen: Blood Updated: 12/21/21 1422     PTT 59.1 seconds     Basic Metabolic Panel [[id number]]  (Abnormal) Collected: 12/21/21 0613    Specimen: Blood Updated: 12/21/21 0653     Glucose 90 mg/dL      BUN 8 mg/dL      Creatinine 0.69 mg/dL      Sodium 143 mmol/L      Potassium 3.4 mmol/L      Chloride 108 mmol/L      CO2 23.0 mmol/L      Calcium 8.4 mg/dL      eGFR Non African Amer 90 mL/min/1.73      BUN/Creatinine Ratio 11.6     Anion Gap 12.0 mmol/L     Narrative:      GFR Normal >60  Chronic Kidney Disease <60  Kidney Failure <15      Lipid Panel [[id number]] Collected: 12/21/21 0613    Specimen: Blood Updated: 12/21/21 0653     Total Cholesterol 92 mg/dL      Triglycerides 85 mg/dL      HDL Cholesterol 41 mg/dL      LDL Cholesterol  34 mg/dL      VLDL Cholesterol 17 mg/dL      LDL/HDL Ratio 0.83    Narrative:      Cholesterol Reference Ranges  (U.S. Department of Health and Human  Services ATP III Classifications)    Desirable          <200 mg/dL  Borderline High    200-239 mg/dL  High Risk          >240 mg/dL      Triglyceride Reference Ranges  (U.S. Department of Health and Human Services ATP III Classifications)    Normal           <150 mg/dL  Borderline High  150-199 mg/dL  High             200-499 mg/dL  Very High        >500 mg/dL    HDL Reference Ranges  (U.S. Department of Health and Human Services ATP III Classifcations)    Low     <40 mg/dl (major risk factor for CHD)  High    >60 mg/dl ('negative' risk factor for CHD)        LDL Reference Ranges  (U.S. Department of Health and Human Services ATP III Classifcations)    Optimal          <100 mg/dL  Near Optimal     100-129 mg/dL  Borderline High  130-159 mg/dL  High             160-189 mg/dL  Very High        >189 mg/dL    CBC (No Diff) [[id number]]  (Abnormal) Collected: 12/21/21 0613    Specimen: Blood Updated: 12/21/21 0645     WBC 5.33 10*3/mm3      RBC 3.32 10*6/mm3      Hemoglobin 6.8 g/dL      Hematocrit 24.2 %      MCV 72.9 fL      MCH 20.5 pg      MCHC 28.1 g/dL      RDW 22.9 %      RDW-SD 57.8 fl      Platelets 273 10*3/mm3     aPTT [[id number]]  (Abnormal) Collected: 12/21/21 0613    Specimen: Blood Updated: 12/21/21 0634     PTT 64.0 seconds     aPTT [[id number]]  (Abnormal) Collected: 12/20/21 2242    Specimen: Blood Updated: 12/20/21 2341     PTT 95.7 seconds     Iron Profile [[id number]]  (Abnormal) Collected: 12/20/21 1632    Specimen: Blood Updated: 12/20/21 2056     Iron 10 mcg/dL      Iron Saturation 2 %      Transferrin 302 mg/dL      TIBC 450 mcg/dL     Hemoglobin A1c [[id number]]  (Normal) Collected: 12/20/21 1320    Specimen: Blood Updated: 12/20/21 1829     Hemoglobin A1C 5.30 %     Narrative:      Hemoglobin A1C Ranges:    Increased Risk for Diabetes  5.7% to 6.4%  Diabetes                     >= 6.5%  Diabetic Goal                < 7.0%    Troponin [[id number]]  (Normal) Collected: 12/20/21 1632    Specimen: Blood  Updated: 12/20/21 1703     Troponin T <0.010 ng/mL     Narrative:      Troponin T Reference Range:  <= 0.03 ng/mL-   Negative for AMI  >0.03 ng/mL-     Abnormal for myocardial necrosis.  Clinicians would have to utilize clinical acumen, EKG, Troponin and serial changes to determine if it is an Acute Myocardial Infarction or myocardial injury due to an underlying chronic condition.       Results may be falsely decreased if patient taking Biotin.      POC Occult Blood Stool [[id number]]  (Normal) Collected: 12/20/21 1647    Specimen: Stool Updated: 12/20/21 1647     Fecal Occult Blood Negative     Lot Number [id number]     Expiration Date 4/30/22     DEVELOPER LOT NUMBER [id number]     DEVELOPER EXPIRATION DATE 4/30/22     Positive Control Positive     Negative Control Negative    aPTT [[id number]]  (Abnormal) Collected: 12/20/21 1321    Specimen: Blood Updated: 12/20/21 1627     PTT 35.1 seconds     COVID-19,[facility] IN-HOUSE,Nasal Swab No Transport Media 3-4 HR TAT - Swab, Nasal Cavity [[id number]]  (Normal) Collected: 12/20/21 1320    Specimen: Swab from Nasal Cavity Updated: 12/20/21 1420     COVID19 Not Detected    Narrative:      Fact sheet for providers: https://www.fda.gov/media/316198/download     Fact sheet for patients: https://www.fda.gov/media/132944/download    Test performed by PCR.    Consider negative results in combination with clinical observations, patient history, and epidemiological information.    Comprehensive Metabolic Panel [[id number]]  (Abnormal) Collected: 12/20/21 1321    Specimen: Blood Updated: 12/20/21 1402     Glucose 87 mg/dL      BUN 9 mg/dL      Creatinine 0.67 mg/dL      Sodium 140 mmol/L      Potassium 3.8 mmol/L      Comment: Slight hemolysis detected by analyzer. Results may be affected.        Chloride 107 mmol/L      CO2 22.0 mmol/L      Calcium 8.6 mg/dL      Total Protein 6.3 g/dL      Albumin 3.20 g/dL      ALT (SGPT) 9 U/L      AST (SGOT) 14 U/L      Alkaline Phosphatase 76 U/L      Total  Bilirubin 0.2 mg/dL      eGFR Non African Amer 93 mL/min/1.73      Globulin 3.1 gm/dL      A/G Ratio 1.0 g/dL      BUN/Creatinine Ratio 13.4     Anion Gap 11.0 mmol/L     Narrative:      GFR Normal >60  Chronic Kidney Disease <60  Kidney Failure <15      Manual Differential [[id number]]  (Abnormal) Collected: 12/20/21 1320    Specimen: Blood Updated: 12/20/21 1402     Neutrophil % 64.6 %      Lymphocyte % 27.3 %      Monocyte % 4.0 %      Eosinophil % 4.0 %      Neutrophils Absolute 4.63 10*3/mm3      Lymphocytes Absolute 1.95 10*3/mm3      Monocytes Absolute 0.29 10*3/mm3      Eosinophils Absolute 0.29 10*3/mm3      nRBC 2.0 /100 WBC      Anisocytosis Mod/2+     Hypochromia Mod/2+     Microcytes Slight/1+     Poikilocytes Slight/1+     Polychromasia Large/3+     WBC Morphology Normal     Giant Platelets Mod/2+    CBC & Differential [[id number]]  (Abnormal) Collected: 12/20/21 1320    Specimen: Blood Updated: 12/20/21 1402    Narrative:      The following orders were created for panel order CBC & Differential.  Procedure                               Abnormality         Status                     ---------                               -----------         ------                     CBC Auto Differential[[id number]]        Abnormal            Final result                 Please view results for these tests on the individual orders.    CBC Auto Differential [[id number]]  (Abnormal) Collected: 12/20/21 1320    Specimen: Blood Updated: 12/20/21 1402     WBC 7.16 10*3/mm3      RBC 3.59 10*6/mm3      Hemoglobin 7.4 g/dL      Hematocrit 26.3 %      MCV 73.3 fL      MCH 20.6 pg      MCHC 28.1 g/dL      RDW 23.2 %      RDW-SD 57.7 fl      Platelets 291 10*3/mm3     Narrative:      ckd    BNP [[id number]]  (Abnormal) Collected: 12/20/21 1321    Specimen: Blood Updated: 12/20/21 1359     proBNP 5,629.0 pg/mL     Narrative:      Among patients with dyspnea, NT-proBNP is highly sensitive for the detection of acute congestive heart  failure. In addition NT-proBNP of <300 pg/ml effectively rules out acute congestive heart failure with 99% negative predictive value.    Results may be falsely decreased if patient taking Biotin.      Troponin [[id number]]  (Normal) Collected: 12/20/21 1321    Specimen: Blood Updated: 12/20/21 1358     Troponin T <0.010 ng/mL     Narrative:      Troponin T Reference Range:  <= 0.03 ng/mL-   Negative for AMI  >0.03 ng/mL-     Abnormal for myocardial necrosis.  Clinicians would have to utilize clinical acumen, EKG, Troponin and serial changes to determine if it is an Acute Myocardial Infarction or myocardial injury due to an underlying chronic condition.       Results may be falsely decreased if patient taking Biotin.      Magnesium [[id number]]  (Normal) Collected: 12/20/21 1321    Specimen: Blood Updated: 12/20/21 1357     Magnesium 2.0 mg/dL     Protime-INR [[id number]]  (Normal) Collected: 12/20/21 1321    Specimen: Blood Updated: 12/20/21 1356     Protime 12.8 Seconds      INR 1.00    D-dimer, Quantitative [[id number]]  (Abnormal) Collected: 12/20/21 1321    Specimen: Blood Updated: 12/20/21 1356     D-Dimer, Quantitative 1.22 mg/L (FEU)     Narrative:      Reference Range is 0-0.50 mg/L FEU. However, results <0.50 mg/L FEU tends to rule out DVT or PE. Results >0.50 mg/L FEU are not useful in predicting absence or presence of DVT or PE.          Imaging Results (All)     Procedure Component Value Units Date/Time    US Venous Doppler Lower Extremity Bilateral (duplex) [[id number]] Collected: 12/21/21 1551     Updated: 12/21/21 1556    Narrative:      History: Swelling       Impression:      Impression:  1. There is evidence of extensive deep venous thrombosis in the left  lower extremity.  2. Further imaging/evaluation may be warranted.     Comments: Bilateral lower extremity venous duplex exam was performed  using color Doppler flow, Doppler waveform analysis, and grayscale  imaging, with and without compression.  There is evidence of deep venous  thrombosis in the common femoral, profunda femoris, superficial femoral,  popliteal, peroneal, and posterior tibial veins in the left lower  extremity. There is no evidence of deep venous thrombosis of the right  lower extremity. There is superficial thrombus in the saphenofemoral  junction in the left lower extremity.        This report was finalized on 12/21/2021 15:53 by Dr. Celestino Vasquez MD.    CT Angiogram Chest [[id number]] Collected: 12/20/21 1531     Updated: 12/20/21 1541    Narrative:      CT ANGIOGRAM CHEST- 12/20/2021 3:14 PM CST     HISTORY: dyspnea, elevated dimer      COMPARISON: 2/27/2017     DOSE LENGTH PRODUCT: 532 mGy cm. Automated exposure control was also  utilized to decrease patient radiation dose.     TECHNIQUE: Axial images the chest are obtained following IV contrast.  2-D and maximal intensity projection images are reconstructed and  reviewed.     FINDINGS:  There are scattered bilateral pulmonary emboli involving the  distal main pulmonary arteries with emboli extending into all 5  segmental pulmonary artery branches. Thrombus burden slightly greater on  the right. RV to LV ratio slightly elevated 1.41 suggesting mild right  ventricular heart strain. There is mild cardiomegaly with a left  subclavian cardiac pacer device. Trace pericardial fluid. No pleural  effusions. No thoracic aortic aneurysm or dissection identified. No  pathologic intrathoracic or axillary lymphadenopathy. Fat necrosis  suspected within the superior left chest wall.     Postoperative changes of the stomach. Small hiatal hernia.  Cholecystectomy clips.     Calcified granuloma within the left upper lobe. Scattered Groundglass  opacities suspicious for multifocal pneumonia versus ischemia. No  pneumothorax. No discrete endobronchial lesion.     Mild degenerative change of the thoracic spine       Impression:      1. Diffuse bilateral pulmonary emboli involving the distal  main  pulmonary arteries extending into the subsegmental branches. Mild right  ventricular heart strain with an RV to LV ratio measuring 1.41. Findings  called to Dr. Blue in the ER at 3:30 PM on 12/20/2021  2. Scattered groundglass opacities may represent multifocal pneumonia  and/or ischemic changes of the parenchyma.  This report was finalized on 12/20/2021 15:38 by Dr. Tati Troy MD.        Hospital Course  Patient is a 51 y.o. female presented to [facility] emergency room 12/20/2021 with increasing shortness of breath.  She has a history of tobacco use, morbid obesity, TIA versus stroke.  She has a pacemaker for unknown reasons.  Patient received [employer] Covid vaccine in April of this year and reported difficulty since that time.  Patient has a right facial droop and difficulty with her speech since May or June of this year.  On saw her primary care provider earlier for 3-month checkup was in her usual state of health.  However, she noted she was winded more than normal on the way to the doctor's office but felt she was at baseline with exertional shortness of breath.  The following day she noted shortness of breath was much worse with minimal exertion and patient had reported feeling worse since that time.  She denied fever, chills, chest pain or palpitations.  She denied lower extremity edema.  She did report chronic charley horses in her legs left greater than right.  She reported infrequent nonproductive cough.  Patient's sister works at Dr. Nash's office and has been monitoring patient's anemia.  Patient was noted to have worsening anemia with hemoglobin 7.7 with previous hemoglobin 8.9.  Patient denied any bright red bleeding per rectum or dark tarry stools.  Sodium 140, potassium 3.8, creatinine 0.67, WBC 7.9, hemoglobin 7.4, hematocrit 26.3, platelets 291,000, proBNP 5629, troponin negative, D-dimer 1.22.  CT angiogram chest reported diffuse bilateral pulmonary emboli  involving the distal main pulmonary arteries extending into subsegmental branches.  Mild right ventricular heart strain with RV to LV ratio measuring 1.41.  Scattered groundglass opacities may represent multifocal pneumonia or ischemic changes of the parenchyma.  Heparin bolus and drip started in the emergency room.    She was admitted to the medical floor with acute bilateral pulmonary emboli, microcytic anemia.  Heparin drip continued.  Case discussed with cardiology to determine if patient would be a candidate for EKOS procedure.  Echocardiogram 12/21 noted ejection fraction 56 to 60%.  Right ventricular cavity mildly dilated.  Normal right ventricular systolic function.  Mild tricuspid regurgitation.  RVSP moderately elevated 45-55.  Small less than 1 cm pericardial effusion.  Venous Dopplers positive for  extensive DVT see v,sa today, the AFP, proximal distal SFV, popliteal, PTV and peroneal's left lower extremity.   Heparin drip continued.    Dr. Tovar consulted and reviewed echocardiogram and CT scan.  Even though patient has bilateral pulmonary emboli with evidence of right heart strain she is also noted to be anemic with decreased hemoglobin without obvious source of blood loss.  This likely represents iron deficiency anemia but with uncertainty patient would not be considered a candidate for invasive procedure at this time.    Microcytic iron deficiency anemia.  Iron 10, saturation 2%.  Hemoglobin 7.4 on admission and trended down to 6.8 on 12/21.  She was transfused 1 unit packed red blood cells.  Hemoglobin 8.5 posttransfusion.  Again no signs of bright red bleeding per rectum or dark tarry stool and fecal occult blood remain negative.  Hemoglobin trended down to 7.3 on 12/22 with repeat hemoglobin 7.8 and hematocrit 28.  IV Venofer daily for 2 doses given during hospital stay.  Patient has a history of gastric resection and may not absorb oral iron and will likely need IV Venofer transfusions as an  "outpatient.  Lovenox transitioned to Eliquis 10 mg orally twice daily for 7 days beginning 12/22/2020 then Eliquis 5 mg orally twice daily beginning 12/29/2021.     Hematology consulted for microcytic anemia and she was seen by Dr. Espana with recommendations for hypercoagulable work-up on outpatient basis, transfuse packed cells if hemoglobin less than 7.  Okay to transition heparin to Eliquis.  Patient will follow up with Dr. Goldberg, hematology with first available appointment.  Office will contact patient with date and time of appointment.     Lipid panel LDL 34, hemoglobin A1c 5.3.  Discussed smoking cessation with patient.    On 12/22/2021 she is stable for discharge.  She presented with bilateral pulmonary emboli and extensive DVT left lower extremity.  She was started on heparin drip and evaluated by cardiology and not deemed a candidate for invasive procedure.  Heparin transitioned to Eliquis and starter pack ordered at discharge.  She will follow-up with Dr. Nash on 12/29/2021 CBC.  Discussed with her sister, Mari who also works in 's office.  Dr. Goldberg's office will contact patient with date and time of appointment.    Physical Exam on Discharge:  /71 (BP Location: Left arm, Patient Position: Lying)   Pulse 78   Temp 97.4 °F (36.3 °C) (Axillary)   Resp 16   Ht 160 cm (63\")   Wt 118 kg (260 lb)   SpO2 93%   Breastfeeding No   BMI 46.06 kg/m²   Physical Exam  Vitals and nursing note reviewed.   Constitutional:       Appearance: She is obese.      Comments: Lying in bed.  No oxygen use.  No family in room.   HENT:      Head: Normocephalic and atraumatic.      Nose: No congestion or rhinorrhea.      Mouth/Throat:      Pharynx: Oropharynx is clear. No oropharyngeal exudate or posterior oropharyngeal erythema.   Eyes:      Extraocular Movements: Extraocular movements intact.      Pupils: Pupils are equal, round, and reactive to light.   Cardiovascular:      Rate and Rhythm: Normal " rate and regular rhythm.      Heart sounds: No murmur heard.       Comments: Normal sinus rhythm 70s on telemetry.  Pulmonary:      Breath sounds: No wheezing, rhonchi or rales.      Comments: No oxygen in use.  Abdominal:      Palpations: Abdomen is soft.      Tenderness: There is no abdominal tenderness.   Genitourinary:     Comments: Voiding.  Musculoskeletal:         General: No swelling or tenderness.      Cervical back: Normal range of motion and neck supple.   Skin:     General: Skin is warm and dry.   Neurological:      General: No focal deficit present.      Mental Status: She is alert and oriented to person, place, and time.      Comments: Mild right facial droop, chronic.   Psychiatric:         Mood and Affect: Mood normal.         Behavior: Behavior normal.         Thought Content: Thought content normal.         Judgment: Judgment normal.         Condition on Discharge: Stable    Discharge Disposition: Home with family    Discharge Diet:   Diet Instructions     Advance Diet As Tolerated            Activity at Discharge:   Activity Instructions     Activity as Tolerated            Discharge Care Plan / Instructions:   1.  For worsening shortness of breath seek medical attention.  2.  Eliquis 10 mg orally twice daily for 7 days begin 12/22-21 then Eliquis 5 mg twice daily begin 12/29/2021.  Eliquis starter pack filled by [facility] to beds at discharge.  3.  Follow-up with Dr. Nash 12/29/2021 with CBC.  4.  Follow-up with Dr. Goldberger for iron deficiency anemia.  Office will contact patient with date and time of first available appointment.    Discharge Medications:     Discharge Medications      New Medications      Instructions Start Date   Eliquis DVT/PE Starter Pack tablet therapy pack  Generic drug: Apixaban Starter Pack   5 mg, Oral, Take As Directed         Continue These Medications      Instructions Start Date   albuterol sulfate  (90 Base) MCG/ACT inhaler  Commonly known as: PROVENTIL  HFA;VENTOLIN HFA;PROAIR HFA   2 puffs, Inhalation, Every 4 Hours PRN      albuterol (2.5 MG/3ML) 0.083% nebulizer solution  Commonly known as: PROVENTIL   2.5 mg, Nebulization, Every 4 Hours PRN      butalbital-acetaminophen-caffeine -40 MG per tablet  Commonly known as: FIORICET, ESGIC   1 tablet, Oral, Daily PRN      DULoxetine 60 MG capsule  Commonly known as: CYMBALTA   90 mg, Oral, Nightly, Pt takes a 30 mg and a 60 mg for a total of 90 mg      escitalopram 10 MG tablet  Commonly known as: LEXAPRO   10 mg, Oral, Daily      gabapentin 300 MG capsule  Commonly known as: NEURONTIN   300 mg, Oral, 3 Times Daily      meclizine 25 MG tablet  Commonly known as: ANTIVERT   25 mg, Oral, 3 Times Daily PRN      ondansetron 4 MG tablet  Commonly known as: ZOFRAN   4 mg, Oral, Every 6 Hours PRN      oxyCODONE-acetaminophen 7.5-325 MG per tablet  Commonly known as: PERCOCET   1 tablet, Oral, 3 Times Daily PRN      pantoprazole 40 MG EC tablet  Commonly known as: PROTONIX   40 mg, Oral, 2 Times Daily      rOPINIRole 1 MG tablet  Commonly known as: REQUIP   1 mg, Oral, 2 Times Daily, Take 1 hour before bedtime.      topiramate 50 MG tablet  Commonly known as: TOPAMAX   50 mg, Oral, Daily           Follow-up Appointments:    Follow-up Information     Goldberg, Amit, MD Follow up today.    Specialties: Hematology and Oncology, Oncology  Why: Office will call patient with appointment date and time.  Contact information:  [address] [place] [address] [address]  [facility] [id number] [phone]             Sushil Nash MD. Go on 12/29/2021.    Specialty: Family Medicine  Why: @ 10:40 AM Needs CBC  Contact information:  [address]  [facility] [id number] [phone]                       Future Appointments:  No future appointments.    Test Results Pending at Discharge: None    The above documentation resulted from a face-to-face encounter by me Susan LEIJA, [facility].    Electronically signed by HELADIO Nolan,  12/22/2021, 14:53 CST.    Time: This discharge process required greater than 35 minutes for completion.    Plan discussed with Dr. Nelson, Dr. Espana, patient, and sister Mari.    Time spent in face-to-face evaluation, chart review, planning and education greater than 35 minutes.

## 2021-12-22 NOTE — CONSULTS
[facility] Oncology/Hematology Services  CONSULT NOTE    PATIENT NAME:  Yecenia Tabares  YOB: 1970  PATIENT MRN:  [id number]    Date of Admission:  12/20/2021  Consultation Date:  12/22/2021  Referring Provider: Provider, No Known    Subjective     Reason for Consultation: Microcytic anemia from iron deficiency, pulmonary embolism, and deep venous thrombosis, left leg.    History of present illness: Yecenia Tabares is a pleasant 51 y.o. female who is seen in consult for iron deficiency anemia and deep venous thrombosis.  She is seen alone at the bedside. She is a poor historian.    She presented with shortness of breathing.    CBC 12/20/2021 revealed a WBC of 7.1, hemoglobin 7.4, hematocrit 26.3, MCV 73.3, and platelet 291.  2 nucleated RBCs noted.  CMP remarkable for albumin of 3.2.  PT 12.8 and INR 1.  proBNP 5629.  D-dimer was 1.22.  PTT 35.1.  Iron 10, saturation 2, TIBC 450.  Stool for occult blood x1 was negative.    CT chest angiogram 12/20/2021.Diffuse bilateral pulmonary emboli involving the distal main  pulmonary arteries extending into the subsegmental branches. Mild right ventricular heart strain with an RV to LV ratio measuring 1.41.  Scattered groundglass opacities may represent multifocal pneumonia and/or ischemic changes of the parenchyma.    Patient started on heparin drip.    Echocardiogram 12/21/2021, ejection fraction 56-60%    Venous Doppler lateral legs 12/21/2021.There is evidence of deep venous thrombosis in the common femoral, profunda femoris, superficial femoral, popliteal, peroneal, and posterior tibial veins in the left lower extremity. There is no evidence of deep venous thrombosis of the right  lower extremity. There is superficial thrombus in the saphenofemoral junction in the left lower extremity.    CBC 12/21/2021 revealed WBC 5.3, hemoglobin 6.8, hematocrit 24.2, MCV 72.9, and platelet 273.    1 unit packed RBC given.    Venofer 200 mg started  "12/21/2021 for 3 days.    CBC 12/22/2021 revealed a WBC of 5.1, hemoglobin 7.3, hematocrit 26.2, MCV 75.1 and platelet 262.  BMP remarkable for calcium of 8.    She had no prior history of malignancy or hypercoagulable state. Her father had malignancy with brain involvement and a family member with breast cancer. She is not certain. \" My sister is not here.\"    Medication negative for hormone-containing medication.    Social history positive for smoking.    With above background, she is seen.    Past Medical History:  Past Medical History:   Diagnosis Date   • Anxiety    • GERD (gastroesophageal reflux disease)    • Injury of back    • Neuropathy    • Spondylisthesis      Prior Surgeries:  Past Surgical History:   Procedure Laterality Date   • BREAST SURGERY     • CHOLECYSTECTOMY     • DILATATION AND CURETTAGE     • GASTRIC SLEEVE LAPAROSCOPIC     • INSERT / REPLACE / REMOVE PACEMAKER     • PACEMAKER IMPLANTATION          Allergies:Erythromycin, Penicillins, and Ampicillin  PTA Meds:  Medications Prior to Admission   Medication Sig Dispense Refill Last Dose   • albuterol (PROVENTIL) (2.5 MG/3ML) 0.083% nebulizer solution Take 2.5 mg by nebulization Every 4 (Four) Hours As Needed for Wheezing.   12/19/2021   • albuterol sulfate  (90 Base) MCG/ACT inhaler Inhale 2 puffs Every 4 (Four) Hours As Needed for Wheezing.   12/19/2021   • butalbital-acetaminophen-caffeine (FIORICET, ESGIC) -40 MG per tablet Take 1 tablet by mouth Daily As Needed for headaches.   Past Week at Unknown time   • escitalopram (LEXAPRO) 10 MG tablet Take 10 mg by mouth Daily.   12/19/2021   • meclizine (ANTIVERT) 25 MG tablet Take 25 mg by mouth 3 (Three) Times a Day As Needed for dizziness.   12/19/2021 at Unknown time   • ondansetron (ZOFRAN) 4 MG tablet Take 4 mg by mouth Every 6 (Six) Hours As Needed for Nausea or Vomiting.   Past Month at Unknown time   • oxyCODONE-acetaminophen (PERCOCET) 7.5-325 MG per tablet Take 1 tablet by " mouth 3 (Three) Times a Day As Needed.   12/19/2021   • pantoprazole (PROTONIX) 40 MG EC tablet Take 40 mg by mouth 2 (Two) Times a Day.   12/19/2021   • rOPINIRole (REQUIP) 1 MG tablet Take 1 mg by mouth 2 (Two) Times a Day. Take 1 hour before bedtime.   12/19/2021   • DULoxetine (CYMBALTA) 60 MG capsule Take 90 mg by mouth Every Night. Pt takes a 30 mg and a 60 mg for a total of 90 mg   12/19/2021   • gabapentin (NEURONTIN) 300 MG capsule Take 300 mg by mouth 3 (Three) Times a Day.   12/19/2021   • topiramate (TOPAMAX) 50 MG tablet Take 50 mg by mouth Daily.   12/19/2021      Current Meds:   Current Facility-Administered Medications   Medication Dose Route Frequency Provider Last Rate Last Admin   • acetaminophen (TYLENOL) tablet 650 mg  650 mg Oral Q4H PRN Cade Nelson MD   650 mg at 12/22/21 0042   • apixaban (ELIQUIS) tablet 10 mg  10 mg Oral Q12H Susan Nielson APRN        Followed by   • [START ON 12/29/2021] apixaban (ELIQUIS) tablet 5 mg  5 mg Oral Q12H Susan Nielson APRN       • DULoxetine (CYMBALTA) DR capsule 90 mg  90 mg Oral Nightly Carroll Wren, DO   90 mg at 12/21/21 2045   • gabapentin (NEURONTIN) capsule 300 mg  300 mg Oral TID Carroll Wren, DO   300 mg at 12/21/21 2045   • heparin (porcine) injection 4,280 Units  40 Units/kg Intravenous PRN Cade Nelson MD   4,280 Units at 12/21/21 1518   • heparin (porcine) injection 8,560 Units  80 Units/kg Intravenous PRN Cade Nelson MD       • ipratropium-albuterol (DUO-NEB) nebulizer solution 3 mL  3 mL Nebulization Q4H PRN Cade Nelson MD       • iron sucrose 200 mg in 100 mL NS  200 mg Intravenous Q24H Susan Nielson APRN   200 mg at 12/21/21 2047   • ondansetron (ZOFRAN) injection 4 mg  4 mg Intravenous Q6H PRN Cade Nelson MD       • oxyCODONE-acetaminophen (PERCOCET) 7.5-325 MG per tablet 1 tablet  1 tablet Oral Q8H PRN Carroll Wren DO   1 tablet at 12/21/21 2045   •  pantoprazole (PROTONIX) EC tablet 40 mg  40 mg Oral Q AM Cade Nelson MD   40 mg at 12/22/21 0557   • rOPINIRole (REQUIP) tablet 1 mg  1 mg Oral BID Carroll Wren DO   1 mg at 12/21/21 2045   • sodium chloride 0.9 % flush 10 mL  10 mL Intravenous Q12H Cade Nelson MD   10 mL at 12/20/21 2254   • sodium chloride 0.9 % flush 10 mL  10 mL Intravenous PRN Cade Nelson MD       • sodium chloride 0.9 % infusion  100 mL/hr Intravenous Continuous Cade Nelson  mL/hr at 12/21/21 1521 100 mL/hr at 12/21/21 1521   • topiramate (TOPAMAX) tablet 25 mg  25 mg Oral Daily Susan Nielson APRN   25 mg at 12/21/21 1421       Family History:family history includes Cancer in her father; Stroke in her mother and sister.   Social History: reports that she has been smoking cigarettes. She has been smoking about 1.00 pack per day. She does not have any smokeless tobacco history on file. She reports that she does not drink alcohol and does not use drugs.    Review of Systems  Review of Systems   Constitutional: Positive for fatigue. Negative for chills and fever.   HENT: Negative for congestion and nosebleeds.    Eyes: Negative for redness and visual disturbance.   Respiratory: Negative for shortness of breath and wheezing.    Cardiovascular: Negative for chest pain and palpitations.   Gastrointestinal: Negative for blood in stool, nausea and vomiting.   Endocrine: Negative for polydipsia and polyphagia.   Genitourinary: Negative for hematuria and vaginal bleeding.   Musculoskeletal: Negative for neck stiffness.   Skin: Positive for pallor.   Allergic/Immunologic: Negative for immunocompromised state.   Neurological: Negative for dizziness, speech difficulty and weakness.   Hematological: Negative for adenopathy.   Psychiatric/Behavioral: Negative for agitation, confusion and hallucinations.         Objective      Vital Signs   Temp:  [97.4 °F (36.3 °C)-98.9 °F (37.2 °C)] 97.4 °F (36.3  °C)  Heart Rate:  [68-88] 68  Resp:  [16-18] 16  BP: ()/(61-85) 107/61    Physical Exam  Vitals and nursing note reviewed.   Constitutional:       General: She is not in acute distress.  HENT:      Head: Normocephalic and atraumatic.   Eyes:      General: No scleral icterus.  Cardiovascular:      Rate and Rhythm: Normal rate and regular rhythm.   Pulmonary:      Breath sounds: No wheezing or rales.   Abdominal:      General: Bowel sounds are normal.      Palpations: Abdomen is soft.      Tenderness: There is no abdominal tenderness.   Musculoskeletal:         General: No swelling.      Cervical back: Neck supple.   Skin:     General: Skin is warm.      Coloration: Skin is pale.   Neurological:      Mental Status: She is alert and oriented to person, place, and time.   Psychiatric:         Mood and Affect: Mood normal.         Behavior: Behavior normal.         Thought Content: Thought content normal.         Judgment: Judgment normal.       Results from last 7 days   Lab Units 12/22/21  0302 12/21/21  1511 12/21/21  0613 12/20/21  1320 12/20/21  1320   WBC 10*3/mm3 5.11  --  5.33  --  7.16   HEMOGLOBIN g/dL 7.3* 8.5* 6.8*   < > 7.4*   HEMATOCRIT % 26.2* 29.0* 24.2*   < > 26.3*   PLATELETS 10*3/mm3 262  --  273  --  291    < > = values in this interval not displayed.        Results from last 7 days   Lab Units 12/22/21  0302 12/21/21  0613 12/20/21  1321   SODIUM mmol/L 140 143 140   POTASSIUM mmol/L 3.8 3.4* 3.8   CHLORIDE mmol/L 108* 108* 107   CO2 mmol/L 23.0 23.0 22.0   BUN mg/dL 11 8 9   CREATININE mg/dL 0.86 0.69 0.67   CALCIUM mg/dL 8.0* 8.4* 8.6   BILIRUBIN mg/dL  --   --  0.2   ALK PHOS U/L  --   --  76   ALT (SGPT) U/L  --   --  9   AST (SGOT) U/L  --   --  14   GLUCOSE mg/dL 101* 90 87       ABG:  No results found for: PHART, PO2ART, WGZ8HFU    Culture Data:   No results found for: BLOODCX, URINECX, WOUNDCX, MRSACX, RESPCX, STOOLCX    Radiology:   Imaging Results (Last 7 Days)     Procedure Component  Value Units Date/Time    US Venous Doppler Lower Extremity Bilateral (duplex) [[id number]] Collected: 12/21/21 1551     Updated: 12/21/21 1556    Narrative:      History: Swelling       Impression:      Impression:  1. There is evidence of extensive deep venous thrombosis in the left  lower extremity.  2. Further imaging/evaluation may be warranted.     Comments: Bilateral lower extremity venous duplex exam was performed  using color Doppler flow, Doppler waveform analysis, and grayscale  imaging, with and without compression. There is evidence of deep venous  thrombosis in the common femoral, profunda femoris, superficial femoral,  popliteal, peroneal, and posterior tibial veins in the left lower  extremity. There is no evidence of deep venous thrombosis of the right  lower extremity. There is superficial thrombus in the saphenofemoral  junction in the left lower extremity.        This report was finalized on 12/21/2021 15:53 by Dr. Celestino Vasquez MD.    CT Angiogram Chest [[id number]] Collected: 12/20/21 1531     Updated: 12/20/21 1541    Narrative:      CT ANGIOGRAM CHEST- 12/20/2021 3:14 PM CST     HISTORY: dyspnea, elevated dimer      COMPARISON: 2/27/2017     DOSE LENGTH PRODUCT: 532 mGy cm. Automated exposure control was also  utilized to decrease patient radiation dose.     TECHNIQUE: Axial images the chest are obtained following IV contrast.  2-D and maximal intensity projection images are reconstructed and  reviewed.     FINDINGS:  There are scattered bilateral pulmonary emboli involving the  distal main pulmonary arteries with emboli extending into all 5  segmental pulmonary artery branches. Thrombus burden slightly greater on  the right. RV to LV ratio slightly elevated 1.41 suggesting mild right  ventricular heart strain. There is mild cardiomegaly with a left  subclavian cardiac pacer device. Trace pericardial fluid. No pleural  effusions. No thoracic aortic aneurysm or dissection identified.  No  pathologic intrathoracic or axillary lymphadenopathy. Fat necrosis  suspected within the superior left chest wall.     Postoperative changes of the stomach. Small hiatal hernia.  Cholecystectomy clips.     Calcified granuloma within the left upper lobe. Scattered Groundglass  opacities suspicious for multifocal pneumonia versus ischemia. No  pneumothorax. No discrete endobronchial lesion.     Mild degenerative change of the thoracic spine       Impression:      1. Diffuse bilateral pulmonary emboli involving the distal main  pulmonary arteries extending into the subsegmental branches. Mild right  ventricular heart strain with an RV to LV ratio measuring 1.41. Findings  called to Dr. Blue in the ER at 3:30 PM on 12/20/2021  2. Scattered groundglass opacities may represent multifocal pneumonia  and/or ischemic changes of the parenchyma.  This report was finalized on 12/20/2021 15:38 by Dr. Tati Troy MD.                 Assessment/Plan        ASSESSMENT:   1.  Pulmonary embolism with associated right heart strain consider smoking induced and from obesity, class III, BMI 44.1.  2.  Left lower extremity deep venous thrombosis. Consider smoking induced and from obesity.  3.  Microcytic anemia from iron deficiency due to malabsorption from gastric sleeve surgery negative occult blood x1.  4.  Obesity class III, BMI 44.1.  5.  Tobacco abuse.      PLAN:  1.   Re: The reason for consult.  Hypercoagulable work-up as outpatient.  2.  Patient will transition to apixaban 10 mg every 12 hours for 7 days followed by 5 mg every 12 hours.  3.  Continue Venofer started 12/21/2021 through 12/23/2021. Hold obtaining ferritin. Check thick, B12 and folate.  4. Suggest to transfuse 1 unit packed RBC if hemoglobin less than 7.  Observe for transfusion reactions.  5.  Clinic appointment with Dr. Goldberg, hematology.  6. Disposition per medicine. I will sign off. Please call if needed.        I spent 49 total minutes,  face-to-face, caring for Yecenia today.  Greater than 50% of this time involved counseling and/or coordination of care as documented within this note regarding the patient's illness(es), pros and cons of various treatment options, instructions and/or risk reduction.      I discussed the patients findings and my recommendations with patient and nurse Lisha by phone.  They verbalized understanding.    Dirk Espana MD  12/22/21  07:10 CST        Thank you for allowing me to participate in the care of Ms. Yecenia Lawrence [place]

## 2021-12-23 ENCOUNTER — TELEPHONE (OUTPATIENT)
Dept: ONCOLOGY | Facility: CLINIC | Age: 51
End: 2021-12-23

## 2021-12-23 DIAGNOSIS — E53.8 LOW SERUM VITAMIN B12: Primary | ICD-10-CM

## 2021-12-23 RX ORDER — CYANOCOBALAMIN 1000 UG/ML
1000 INJECTION, SOLUTION INTRAMUSCULAR; SUBCUTANEOUS
Status: DISCONTINUED | OUTPATIENT
Start: 2021-12-23 | End: 2022-01-25

## 2021-12-23 NOTE — TELEPHONE ENCOUNTER
----- Message from Dirk Espana MD sent at 12/22/2021  8:17 PM CST -----  Arrange B12 shot 1000 mcg IM daily for 5 days, then weekly times 4 and then monthly.    Tried to call patient twice to get her scheduled for the above. Patient didn't answer and there is no voicemail set up. I do have the patient scheduled for her first B12 shot which is 1/3/22 at 2:30.    - Patient returned my phone call stating that she would not be able to come to the office for the B12 injections due to her riding situation. She states she normally gives them to herself so we will send this to her pharmacy. Patient verbalized understanding.    paula

## 2022-01-03 ENCOUNTER — APPOINTMENT (OUTPATIENT)
Dept: LAB | Facility: HOSPITAL | Age: 52
End: 2022-01-03

## 2022-01-03 NOTE — PROGRESS NOTES
[facility] GROUP HEMATOLOGY & ONCOLOGY  [address]  Merg[facility] with [facility] [id number] [phone]    Patient Name: Yecenia Tabares  Encounter Date: 01/10/2022  YOB: 1970  Patient Number: [id number]      Subjective     Subjective: 51-year-old morbidly obese female who was seen in the hospital by Dr. Espana for iron deficiency, pulmonary embolism and left leg DVT.  The patient presented to the hospital on 12/20/2021 with shortness of breath and was found to have an elevated D-dimer along with low iron studies.  A CT chest angiogram on 12/20/2021 showed diffuse bilateral pulmonary emboli involving the distal main pulmonary arteries extending into the subsegmental branches.  Mild right ventricular heart strain with RV to LV ratio measuring 1.41.  Scattered groundglass opacities may represent multifocal pneumonia and/or ischemic changes of the parenchyma.  The patient was started on a heparin drip.    Of note, the patient had J&J vaccine in April 2021 and reported difficulty since that time with a right facial droop and difficulty with her speech since May or June of this year (!!!)  She and her sister state that she has been sick since then on and off without stop and symptoms started 2 weeks after the vaccine. Her sister also states that she had a seizure at that time. She does have a history of syncope when she was younger. PPM was checked recently and as per patient and sister was told that it was working fine    Lower extremity Dopplers done on 12/21/21 showed evidence of a DVT in the common femoral, profunda femoris, superficial femoral, popliteal, peroneal and posterior tibial veins in the left lower extremity.  There was no evidence of a DVT in the right side.  The patient also was noted to have superficial thrombus in the saphenous junction in the left lower extremity.  She stated that around this time, she was mostly in bed due to thinking  "she had pneumonia.     In addition to all this, the patient was noted to have a hemoglobin of 6.8 with an MCV of 72.9.  She was given 1 unit of packed red blood cells and also was given Venofer 200 mg for 2 days (as per patient had 2 iron infusions although not documented in the Epic chart). Patient has a history of gastric sleeve procedure and may not absorb oral iron and will likely need IV Venofer transfusions as an outpatient    The patient denied any history of malignancy or previous hypercoagulable state but did state that her father had malignancy with brain involvement as well as family member with breast cancer.  The patient is a smoker.    The patient was started on Lovenox and then transitioned to Eliquis and then changed to  Xarelto (due to insurance) on 12/20/2021. She states that she takes the medications as directed without missing a dose.     She denies family history of clotting but there are many family members and the patient have had miscarriages (she personally had 2   In the early part of the pregnancies)    She complains of pain in the right lower leg \"burning if I keep it straight but goes away when I straighten the leg\".     She no longer gets menstruation (has not had one for 7-8 years). She denies bleeding anywhere else.  She denies any signs of PICA syndrome.     Past Medical History:   Diagnosis Date   • Anxiety    • GERD (gastroesophageal reflux disease)    • Injury of back    • Neuropathy     mostly in the LE's   • Spondylisthesis        Oncology History:  Oncology/Hematology History    No history exists.       Past Surgical History:  Past Surgical History:   Procedure Laterality Date   • BREAST SURGERY      reduction and lift   • CHOLECYSTECTOMY     • DILATATION AND CURETTAGE     • GASTRIC SLEEVE LAPAROSCOPIC  2016   • INSERT / REPLACE / REMOVE PACEMAKER      at 23 years old due to syncope   • PACEMAKER IMPLANTATION        " "___________________________________________________________________________________________________________________________________________________  Medications:   Outpatient Encounter Medications as of 1/10/2022   Medication Sig Dispense Refill   • albuterol (PROVENTIL) (2.5 MG/3ML) 0.083% nebulizer solution Take 2.5 mg by nebulization Every 4 (Four) Hours As Needed for Wheezing.     • albuterol sulfate  (90 Base) MCG/ACT inhaler Inhale 2 puffs Every 4 (Four) Hours As Needed for Wheezing.     • butalbital-acetaminophen-caffeine (FIORICET, ESGIC) -40 MG per tablet Take 1 tablet by mouth Daily As Needed for headaches.     • cyanocobalamin 1000 MCG/ML injection      • DULoxetine (CYMBALTA) 60 MG capsule Take 90 mg by mouth Every Night. Pt takes a 30 mg and a 60 mg for a total of 90 mg     • escitalopram (LEXAPRO) 10 MG tablet Take 10 mg by mouth Daily.     • gabapentin (NEURONTIN) 300 MG capsule Take 300 mg by mouth 3 (Three) Times a Day.     • meclizine (ANTIVERT) 25 MG tablet Take 25 mg by mouth 3 (Three) Times a Day As Needed for dizziness.     • Needle, Disp, 25G X 1\" misc 10 each Daily. 2 each 0   • ondansetron (ZOFRAN) 4 MG tablet Take 4 mg by mouth Every 6 (Six) Hours As Needed for Nausea or Vomiting.     • oxyCODONE-acetaminophen (PERCOCET) 7.5-325 MG per tablet Take 1 tablet by mouth 3 (Three) Times a Day As Needed.     • pantoprazole (PROTONIX) 40 MG EC tablet Take 40 mg by mouth 2 (Two) Times a Day.     • Rivaroxaban (XARELTO) tablet therapy pack starter pack Take one 15 mg tablet twice daily with food for 21 days.  Followed by one 20 mg tablet by mouth once daily with food. Take as directed     • rOPINIRole (REQUIP) 1 MG tablet Take 1 mg by mouth 2 (Two) Times a Day. Take 1 hour before bedtime.     • topiramate (TOPAMAX) 50 MG tablet Take 50 mg by mouth Daily.     • [DISCONTINUED] Apixaban Starter Pack (Eliquis DVT/PE Starter Pack) tablet therapy pack Take two 5 mg tablets by mouth every 12 " hours for 7 days. Followed by one 5 mg tablet every 12 hours. (Dispense starter pack if available) 74 tablet 0     Facility-Administered Encounter Medications as of 1/10/2022   Medication Dose Route Frequency Provider Last Rate Last Admin   • cyanocobalamin injection 1,000 mcg  1,000 mcg Intramuscular Q28 Days Dirk Espana MD         ___________________________________________________________________________________________________________________________________________________  Allergies:   Allergies   Allergen Reactions   • Erythromycin Shortness Of Breath   • Penicillins Hives   • Ampicillin Hives       Social/Family History:   Family History   Problem Relation Age of Onset   • Stroke Mother    • Kidney failure Mother    • Diabetes Mother    • Miscarriages / Stillbirths Mother         x2   • Cancer Father 60        brain   • Diabetes Sister    • Hypertension Sister    • Kidney disease Brother    • Depression Brother    • Miscarriages / Stillbirths Maternal Grandmother         x3   • Heart attack Sister    • Transient ischemic attack Sister         non-hemorrhagoic   • Multiple sclerosis Sister    • Miscarriages / Stillbirths Sister         x1   • Diabetes Sister    • Obesity Sister    • Diabetes Sister    • Stroke Sister    • Diabetes Brother    • Obesity Brother    • Gout Brother         /Single/: , no children, 2 miscarriages    Social History     Tobacco Use   • Smoking status: Current Every Day Smoker     Packs/day: 1.00     Years: 30.00     Pack years: 30.00     Types: Cigarettes   • Smokeless tobacco: Not on file   Substance Use Topics   • Alcohol use: No   • Drug use: No        Occupation: Disabled from back injury, 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  Denies exposure to chemicals and radiation    Objective      Review of Systems     Review of Systems   Constitutional: Positive for activity change (Unable to get out as much because she doesn't have a  car) and fatigue. Negative for appetite change, chills, fever, unexpected weight gain and unexpected weight loss.   HENT: Positive for dental problem (no teeth). Negative for congestion, ear pain, hearing loss, mouth sores, nosebleeds, sore throat, swollen glands and trouble swallowing.    Eyes: Negative.  Negative for blurred vision, double vision, photophobia and pain.        Needs new glasses     Respiratory: Positive for cough, chest tightness, shortness of breath and wheezing. Negative for apnea.    Cardiovascular: Positive for chest pain, palpitations and leg swelling.   Gastrointestinal: Positive for vomiting (from GERD, throwing up in her mouth when she is lying down) and GERD. Negative for abdominal distention, abdominal pain, anal bleeding, blood in stool, constipation, diarrhea and nausea.   Endocrine: Negative for cold intolerance and heat intolerance.   Genitourinary: Negative for breast discharge, breast lump, breast pain, dysuria, frequency, hematuria and vaginal bleeding.   Musculoskeletal: Positive for arthralgias, back pain, gait problem, myalgias and neck stiffness.   Skin: Positive for pallor. Negative for color change, rash, skin lesions and bruise.   Allergic/Immunologic: Negative.  Negative for environmental allergies and immunocompromised state.   Neurological: Positive for dizziness, seizures (seizure in april 2021 2 weeks after J and J vaccine), syncope (history of syncope before PPM but not since), light-headedness, headache, memory problem and confusion. Negative for weakness.   Hematological: Negative for adenopathy. Bruises/bleeds easily.   Psychiatric/Behavioral: Positive for agitation, behavioral problems (nervous tick), self-injury (has tried in the past but called for help -- 6 years ago), sleep disturbance, depressed mood and stress. Negative for hallucinations and suicidal ideas. The patient is nervous/anxious.          Physical Exam     Physical Examination:   Vitals:    01/10/22  0908   BP: 122/72   Pulse: 96   Resp: 18   Temp: 97.2 °F (36.2 °C)   SpO2: 100%    Body surface area is 1.07 meters squared.   Physical Exam  Constitutional:       Appearance: Normal appearance. She is morbidly obese.   HENT:      Head: Normocephalic and atraumatic.      Nose: Nose normal.      Mouth/Throat:      Mouth: Mucous membranes are dry.      Comments: No dentition  Eyes:      Pupils: Pupils are equal, round, and reactive to light.   Cardiovascular:      Rate and Rhythm: Normal rate and regular rhythm.      Pulses: Normal pulses.      Heart sounds: Normal heart sounds.   Pulmonary:      Effort: Pulmonary effort is normal.      Breath sounds: Normal breath sounds.   Chest:   Breasts:      Right: No axillary adenopathy or supraclavicular adenopathy.      Left: No axillary adenopathy or supraclavicular adenopathy.            Comments: Bilaterally symmetrical breasts with no masses palpable, no nipple discharge, no axillary lymphadenopathy. Nipple changes post surgery    Abdominal:      General: Abdomen is flat. Bowel sounds are normal.      Palpations: Abdomen is soft.      Comments: Centrally obese precluding proper evaluation for hepatosplenomegaly     Musculoskeletal:         General: Normal range of motion.      Cervical back: Neck supple.   Lymphadenopathy:      Head:      Right side of head: No submental, submandibular, preauricular, posterior auricular or occipital adenopathy.      Left side of head: No submental, submandibular, preauricular, posterior auricular or occipital adenopathy.      Cervical:      Right cervical: No superficial cervical adenopathy.     Left cervical: No superficial cervical adenopathy.      Upper Body:      Right upper body: No supraclavicular or axillary adenopathy.      Left upper body: No supraclavicular or axillary adenopathy.      Lower Body: No right inguinal adenopathy. No left inguinal adenopathy.   Skin:     General: Skin is warm and moist.      Coloration: Skin is  pale.   Neurological:      General: No focal deficit present.      Mental Status: She is alert and oriented to person, place, and time.      Comments: Walks slowly   Psychiatric:         Attention and Perception: Attention and perception normal.         Mood and Affect: Mood and affect normal.         Speech: Speech normal.         Behavior: Behavior normal. Behavior is cooperative.         Thought Content: Thought content normal.         Cognition and Memory: Cognition and memory normal.         Judgment: Judgment normal.         Labs/Radiology     Labs/X-ray results:     Lab Results - Last 18 Months   Lab Units 01/10/22  0902 12/22/21  1232 12/22/21  0302 12/21/21  1511 12/21/21  0613 12/20/21  1320   HEMOGLOBIN g/dL 10.5* 7.8* 7.3* 8.5* 6.8* 7.4*   HEMATOCRIT % 38.7 28.0* 26.2* 29.0* 24.2* 26.3*   MCV fL 80.0  --  75.1*  --  72.9* 73.3*   WBC 10*3/mm3 5.73  --  5.11  --  5.33 7.16   RDW % 24.6*  --  22.8*  --  22.9* 23.2*   PLATELETS 10*3/mm3 265  --  262  --  273 291   IMM GRAN % % 0.2  --   --   --   --   --    NEUTROS ABS 10*3/mm3 2.73  --   --   --   --  4.63   LYMPHS ABS 10*3/mm3 2.26  --   --   --   --   --    MONOS ABS 10*3/mm3 0.38  --   --   --   --   --    EOS ABS 10*3/mm3 0.32  --   --   --   --  0.29   BASOS ABS 10*3/mm3 0.03  --   --   --   --   --    IMMATURE GRANS (ABS) 10*3/mm3 0.01  --   --   --   --   --    NRBC /100 WBC 0.0  --   --   --   --  2.0*   NEUTROPHIL % %  --   --   --   --   --  64.6   MONOCYTES % %  --   --   --   --   --  4.0*   ANISOCYTOSIS   --   --   --   --   --  Mod/2+   GIANT PLT   --   --   --   --   --  Mod/2+       Lab Results - Last 18 Months   Lab Units 12/22/21  0302 12/21/21  0613 12/20/21  1321   GLUCOSE mg/dL 101* 90 87   SODIUM mmol/L 140 143 140   POTASSIUM mmol/L 3.8 3.4* 3.8   CO2 mmol/L 23.0 23.0 22.0   CHLORIDE mmol/L 108* 108* 107   ANION GAP mmol/L 9.0 12.0 11.0   CREATININE mg/dL 0.86 0.69 0.67   BUN mg/dL 11 8 9   BUN / CREAT RATIO  12.8 11.6 13.4   CALCIUM  mg/dL 8.0* 8.4* 8.6   EGFR IF NONAFRICN AM mL/min/1.73 70 90 93   ALK PHOS U/L  --   --  76   TOTAL PROTEIN g/dL  --   --  6.3   ALT (SGPT) U/L  --   --  9   AST (SGOT) U/L  --   --  14   BILIRUBIN mg/dL  --   --  0.2   ALBUMIN g/dL  --   --  3.20*   GLOBULIN gm/dL  --   --  3.1       No results for input(s): MSPIKE, KAPPALAMB, IGLFLC, URICACID, FREEKAPPAL, CEA, LDH, REFLABREPO in the last [id number] hours.    Lab Results - Last 18 Months   Lab Units 12/22/21  1012 12/20/21  1632   IRON mcg/dL  --  10*   TIBC mcg/dL  --  450   IRON SATURATION %  --  2*   FOLATE ng/mL 5.92  --      ____________________________________________________________________________  Radiology: Adult Transthoracic Echo Complete W/ Cont if Necessary Per Protocol    Result Date: 12/21/2021  Narrative: · Left ventricular systolic function is normal. Left ventricular ejection fraction appears to be 56 - 60%. · The right ventricular cavity is mildly dilated. Normal right ventricular systolic function noted. · Mild tricuspid valve regurgitation is present. Estimated right ventricular systolic pressure from tricuspid regurgitation is moderately elevated (45-55 mmHg). · There is a small (<1cm) pericardial effusion.      US Venous Doppler Lower Extremity Bilateral (duplex)    Result Date: 12/21/2021  Narrative: History: Swelling      Impression: Impression: 1. There is evidence of extensive deep venous thrombosis in the left lower extremity. 2. Further imaging/evaluation may be warranted.  Comments: Bilateral lower extremity venous duplex exam was performed using color Doppler flow, Doppler waveform analysis, and grayscale imaging, with and without compression. There is evidence of deep venous thrombosis in the common femoral, profunda femoris, superficial femoral, popliteal, peroneal, and posterior tibial veins in the left lower extremity. There is no evidence of deep venous thrombosis of the right lower extremity. There is superficial thrombus in the  saphenofemoral junction in the left lower extremity.   This report was finalized on 12/21/2021 15:53 by Dr. Celestino Vasquez MD.    CT Angiogram Chest    Result Date: 12/20/2021  Narrative: CT ANGIOGRAM CHEST- 12/20/2021 3:14 PM CST  HISTORY: dyspnea, elevated dimer  COMPARISON: 2/27/2017  DOSE LENGTH PRODUCT: 532 mGy cm. Automated exposure control was also utilized to decrease patient radiation dose.  TECHNIQUE: Axial images the chest are obtained following IV contrast. 2-D and maximal intensity projection images are reconstructed and reviewed.  FINDINGS:  There are scattered bilateral pulmonary emboli involving the distal main pulmonary arteries with emboli extending into all 5 segmental pulmonary artery branches. Thrombus burden slightly greater on the right. RV to LV ratio slightly elevated 1.41 suggesting mild right ventricular heart strain. There is mild cardiomegaly with a left subclavian cardiac pacer device. Trace pericardial fluid. No pleural effusions. No thoracic aortic aneurysm or dissection identified. No pathologic intrathoracic or axillary lymphadenopathy. Fat necrosis suspected within the superior left chest wall.  Postoperative changes of the stomach. Small hiatal hernia. Cholecystectomy clips.  Calcified granuloma within the left upper lobe. Scattered Groundglass opacities suspicious for multifocal pneumonia versus ischemia. No pneumothorax. No discrete endobronchial lesion.  Mild degenerative change of the thoracic spine      Impression: 1. Diffuse bilateral pulmonary emboli involving the distal main pulmonary arteries extending into the subsegmental branches. Mild right ventricular heart strain with an RV to LV ratio measuring 1.41. Findings called to Dr. Blue in the ER at 3:30 PM on 12/20/2021 2. Scattered groundglass opacities may represent multifocal pneumonia and/or ischemic changes of the parenchyma. This report was finalized on 12/20/2021 15:38 by Dr. Tati Troy MD.          Assessment/Plan      Assessment/Plans:   Date 2.27.17 8.19.19 12.2021 1.10.22        WBC 6.34  8.28  7.16 5.73        Hb 14.2  11.3  7.4 10.5        MCV 89.4  91.6  73.3 80.0        Plt 157  202  291 265        ANC 3.33  4.34  4.63 2.73        ALC 1.95  2.79  1.95 2.26        AMC 0.35  0.56  0.29 0.38        AEC 0.69  0.53  0.29 0.32        ABC 0.02  0.03  2.0 0.03        nRBC    0        Ferritin    46.71        Iron    10 36 (L)        Iron SAT    2% 7%        Transferrin    302 334        TIBC    450 498        Hapto            LDH            KATY            Stefano            Zinc            Retic            B12    170 (!!!)         Folate    5.92         Hep panel            HIV            Creatinine  0.65  0.83  0.67 0.89        Glucose  83  88  87 100        ALT  21  52  9 17        AST  18  49  14 18        Alk phos  59  67  76 79        EGFR  99  74  93 67        D-dimer <0.22   1.22         PT     17.3        PTT     42.4                        **Severe anemia with apparent iron deficiency   -As you may well know, iron deficiency is never normal in a postmenopausal female   -There is no ferritin that was taken in this patient but iron studies were consistent with very low iron saturation and therefore would recommend a complete GI work-up to rule out blood loss leading to iron deficiency and this should include   • Colonoscopy done approximately 2019/20222 and polyp found but reportedly benign  • Endoscopy also done and reportedly only found hiatal hernia and ULCERS were found which are likely causes  Of the iron deficiency  • Small bowel pill endoscopy can also be pursued   • Nuclear medicine bleeding scan can also be pursued   -Full anemia work-up includes the following and will be charted above with the following requiring closer attention  o reticulocyte counts  o folate  o B12 level  o Haptoglobin  o LDH  o Peripheral smear  o KATY  o Iron profile still showing low iron saturation    o Copper  o Zinc  o SPEP  o UPEP  o TFT's   •  Anemia should  be treated with transfusions based on symptoms or usually a hb <7  • Iron deficit based on Hb from hospital was approximately 2000 mg and patient received 1 unit of blood (250 mg of iron) and 2 venofers (200 mg each) so would give either 2 treatments of IV (patient not absorbing PO iron)  Injecatafer or 1350 mg of venofer.   • As always, bone marrow biopsy is the only way to rule out nefarious infiltration into the marrow but not indicated at this time -- can reconsider if the anemia does not resolve after iron supplementation    -As per the note, the patient received the following while hospitalized although not found in the synopsis on epic:  12.21.21 1 unit PRBC    2 x IV venofer (amount and date not available but supposedly 400 mg total)                                -Patient has a history of gastric resection and may not absorb oral iron and will likely need IV Venofer transfusions as an outpatient    ** B12 deficiency    • B12 1000 mcg IM daily for 7 days followed by  • B12 1000 mcg IM weekly for 4 weeks followed by -- currently on this   • B12 1000 mcg IM monthly for life      **Recent admission for left lower extremity DVT with bilateral PE with RV strain  - diffuse bilateral pulmonary emboli involving the distal main pulmonary arteries extending into the subsegmental branches.  Mild right ventricular heart strain with RV to LV ratio measuring 1.41.  Scattered groundglass opacities may represent multifocal pneumonia and/or ischemic changes of the parenchyma  - Lower extremity Dopplers done on 12/21/21 showed evidence of a DVT in the common femoral, profunda femoris, superficial femoral, popliteal, peroneal and posterior tibial veins in the left lower extremity.     -It is generally agreed that routine testing for hypercoagulable disorders and nonselective patients with a diagnosis of VTE is not warranted and this is because most patients with  VTE and identification of an inheritable default does not alter the therapeutic or prophylactic anticoagulation management and has not been associated with improved outcomes.  -Most experts agree the patient with the VTE who have also had at least one first-degree relative with a documented VTE before the age of 45 should be tested for the presence of all 5 inherited thrombophilias.  -In those patients who do not have a family history of VTE there is a rationale to test for a hypercoagulable disorder in the following patients:   -Patients younger than 45 years old usually get tested for all 5 the inherited thrombophilias and APS  -Patients with recurrent thromboses are usually tested for inheritable thrombophilias and antiphospholipid syndromes  -Patients with thrombosis in unusual vascular beds such as portal, hepatic, mesenteric or cerebral veins should be tested for all 5 inherited thrombophilias  -Patients with a history of a warfarin induced skin necrosis which is commonly due to protein C deficiency  -Patients with arterial thrombosis should be tested for the presence of an antiphospholipid antibody  -Patients who do not benefit from extensive work-up for their VTE are those patients that are not listed above because in most patients with an initial episode of VTE, the identification of an inheritable defect does not alter the therapeutic or prophylactic anticoagulation management nor has it been shown to reduce mortality or VTE recurrence  -Those patients who are not currently recommended to have further testing include the following:   -A first episode of provoked VTE   -Those with active malignancy and a VTE   -Those with inflammatory bowel disease in the VTE   -Those with a myeloproliferative disorder and a VTE   -Those with heparin-induced thrombocytopenia with thrombosis   -Those with a retinal vein thrombosis including that in the setting of preeclampsia  -The current recommendations are not to test  patients with their first unprovoked VTE despite the fact that up to 42% may have 1 or more inherited thrombotic disorders.  This is because in this population it is the unprovoked nature of thrombotic events rather than the underlying risk factor that determines both the risk of future recurrence and the duration of anticoagulation.  In a study with long follow-up, most of those who completed 3 to 12 months course of warfarin treatment had a risk of recurrence event not different from patients without an underlying defect  -There are multiple acquired risk factors for DVTs which include more than 48 hours of immobility, hospital admission in the last 3 months, surgery in the past 3 months, malignancy in the past 3 months, infection in the past 3 months, and current hospitalization  -Duration of anticoagulation is normally consideed to be complete at 3 months for first DVT episodes both provoked and unprovoked but in select patients that can be increased to 6-12 months if there is a persistent but reversible risk factor, and sometimes indefinately such as in patients with high risk hereditary hypercoagulation or in patients with cancer, to name a few  - Considerations favoring extended anticoagulation:   - history of recurrent VTE   - male sex   - patient had a PE -in this case, my recommendation is for the patient to consider lifelong anticoagulation due to the  severity and the extent of the thrombus as a moderate thrombosis like this may be fatal in the future.  Patient haremy hsitory  of syncope which resolved after PPM insertion and had a ? Seizure in April of 2021 but none since. ANTICOAGULATION  RISK OF FATAL BLEED ewith falls have to be weighed against risk of fatal clot   - d-dimer on anticoagulation elevated after 3-6 months   - d- dimer elevated after being off anticoagulation for 4 weeks   - obesity   - older age   - persistent underlying RF's (ie active cancer or inflammatory bowel disease)   -  anticoagulation therapy well tolerated   - little or no impact of anticoagulation on patient's lifestyle   - patient prefers to continue anticoagulation   - patient has a known, strong thrombophilia (congential or acquired)  - consideration favoring limited duration of anticoagulation:   - female sex   - distal DVT only   - d- dimer negative after being off anticoagulation for 4 weeks  (especially in women)   - no signs of PTS   - occurrence of bleeding complications or significant risk for bleeding   - patient prefers to be off anticoagulation    Inherited hypercoagulable testing is likely not recommended in this patient due to her age but can proceed with the testing if it will affect family members and if it will change her decision regarding lifelong anticoagulation:    The inheritable thrombophilia panel includes the followin.  APC resistance/factor V Leiden WNL  2.  Prothrombin gene mutation  3.  Protein C functional assay shows elevation in protein C antigen at 165 and elevation of protein C functional at 190 --elevations in protein C are usually not clinically significant but may indicate chronic inflammation or inflammatory disorder  4. protein S free protein and antigen assay shows elevation of free antigen at 145 but normal total antigen at 127 and functional protein S at 123 -- elevations in protein C are usually not clinically significant but may indicate chronic inflammation or inflammatory disorder  5. Antithrombin III -elevated at 154 with a normal antigen at 110 -elevations in Antithrombin III do not correspond with clotting disorder but may theoretically correspond to a bleeding disorder which this patient does not appear to have  6.  PT/PTT/INR  7.  Anticardiolipin antibody WNL  8.  Beta-2 glycoprotein antibodies WNL  9.  Lupus anticoagulant POSITIVE -- if positive again in 12 weeks, would qualify patient for APLA Syndrome and would recommend change of anticoagulants to coumadin.  However,  please note, patient is taking DOAC which can interfere and give a false positive lupus anticoagulant. Please note:  LAC can be FALSSELY ELEVATED in patients on DOAC.  In an attempt to make sure that the LAC is not a false positive, in those patients taking a DOAC, can take off for 48 hours and recheck the LAC (https://www.isl.org/web/downloads/2020-LAC-guidance-J-72556269.pdf)        ** Infection status:   · Of note, the patient had J&J vaccine in April 7,2021 and reported difficulty since that time with a right facial droop and difficulty with her speech since May or June of this year (!!!).  It is important to note that, although rare, thrombotic events are not infrequent with both Covid infection as well as the Covid vaccine especially J&J.  If the patient is planning on getting booster shots that she get either one of the mRNA vaccines (Pfizer or Moderna) as they seem to have less of it chance of thrombotic events. 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 Last colonoscopy 2019/2020 and reportedly benign  - EGD showed ulcers  In 2019/2020  - Last mammogram never had one and she agrees to have one now    Follow up      - Follow up April 2022    Amit Goldberg, MD    Time tracker       Total  time spent caring for patient, reviewing lab and radiology information, and explaining the plan of care to the patient was 60 min

## 2022-01-10 ENCOUNTER — OFFICE VISIT (OUTPATIENT)
Dept: ONCOLOGY | Facility: CLINIC | Age: 52
End: 2022-01-10

## 2022-01-10 ENCOUNTER — LAB (OUTPATIENT)
Dept: LAB | Facility: HOSPITAL | Age: 52
End: 2022-01-10

## 2022-01-10 VITALS
WEIGHT: 245.2 LBS | BODY MASS INDEX: 56.74 KG/M2 | DIASTOLIC BLOOD PRESSURE: 72 MMHG | HEART RATE: 96 BPM | OXYGEN SATURATION: 100 % | TEMPERATURE: 97.2 F | RESPIRATION RATE: 18 BRPM | SYSTOLIC BLOOD PRESSURE: 122 MMHG | HEIGHT: 55 IN

## 2022-01-10 DIAGNOSIS — D64.9 ANEMIA, UNSPECIFIED TYPE: ICD-10-CM

## 2022-01-10 DIAGNOSIS — I27.82 OTHER CHRONIC PULMONARY EMBOLISM WITHOUT ACUTE COR PULMONALE: ICD-10-CM

## 2022-01-10 DIAGNOSIS — E53.8 LOW SERUM VITAMIN B12: Primary | ICD-10-CM

## 2022-01-10 DIAGNOSIS — I82.90 CLOT: ICD-10-CM

## 2022-01-10 DIAGNOSIS — Z13.9 SCREENING DUE: ICD-10-CM

## 2022-01-10 DIAGNOSIS — Z12.31 ENCOUNTER FOR SCREENING MAMMOGRAM FOR MALIGNANT NEOPLASM OF BREAST: ICD-10-CM

## 2022-01-10 LAB
ALBUMIN SERPL-MCNC: 4.2 G/DL (ref 3.5–5.2)
ALBUMIN/GLOB SERPL: 1.3 G/DL
ALP SERPL-CCNC: 79 U/L (ref 39–117)
ALT SERPL W P-5'-P-CCNC: 17 U/L (ref 1–33)
ANION GAP SERPL CALCULATED.3IONS-SCNC: 10 MMOL/L (ref 5–15)
APTT PPP: 42.4 SECONDS (ref 24.1–35)
AST SERPL-CCNC: 18 U/L (ref 1–32)
BASOPHILS # BLD AUTO: 0.03 10*3/MM3 (ref 0–0.2)
BASOPHILS NFR BLD AUTO: 0.5 % (ref 0–1.5)
BILIRUB SERPL-MCNC: 0.2 MG/DL (ref 0–1.2)
BUN SERPL-MCNC: 14 MG/DL (ref 6–20)
BUN/CREAT SERPL: 15.7 (ref 7–25)
CALCIUM SPEC-SCNC: 9.2 MG/DL (ref 8.6–10.5)
CHLORIDE SERPL-SCNC: 106 MMOL/L (ref 98–107)
CO2 SERPL-SCNC: 24 MMOL/L (ref 22–29)
CREAT SERPL-MCNC: 0.89 MG/DL (ref 0.57–1)
DEPRECATED RDW RBC AUTO: 69.2 FL (ref 37–54)
EOSINOPHIL # BLD AUTO: 0.32 10*3/MM3 (ref 0–0.4)
EOSINOPHIL NFR BLD AUTO: 5.6 % (ref 0.3–6.2)
ERYTHROCYTE [DISTWIDTH] IN BLOOD BY AUTOMATED COUNT: 24.6 % (ref 12.3–15.4)
FERRITIN SERPL-MCNC: 46.71 NG/ML (ref 13–150)
FOLATE SERPL-MCNC: 2.76 NG/ML (ref 4.78–24.2)
GFR SERPL CREATININE-BSD FRML MDRD: 67 ML/MIN/1.73
GLOBULIN UR ELPH-MCNC: 3.2 GM/DL
GLUCOSE SERPL-MCNC: 100 MG/DL (ref 65–99)
HCT VFR BLD AUTO: 38.7 % (ref 34–46.6)
HGB BLD-MCNC: 10.5 G/DL (ref 12–15.9)
HOLD SPECIMEN: NORMAL
IMM GRANULOCYTES # BLD AUTO: 0.01 10*3/MM3 (ref 0–0.05)
IMM GRANULOCYTES NFR BLD AUTO: 0.2 % (ref 0–0.5)
INR PPP: 1.47 (ref 0.91–1.09)
IRON 24H UR-MRATE: 36 MCG/DL (ref 37–145)
IRON SATN MFR SERPL: 7 % (ref 20–50)
LYMPHOCYTES # BLD AUTO: 2.26 10*3/MM3 (ref 0.7–3.1)
LYMPHOCYTES NFR BLD AUTO: 39.4 % (ref 19.6–45.3)
MCH RBC QN AUTO: 21.7 PG (ref 26.6–33)
MCHC RBC AUTO-ENTMCNC: 27.1 G/DL (ref 31.5–35.7)
MCV RBC AUTO: 80 FL (ref 79–97)
MONOCYTES # BLD AUTO: 0.38 10*3/MM3 (ref 0.1–0.9)
MONOCYTES NFR BLD AUTO: 6.6 % (ref 5–12)
NEUTROPHILS NFR BLD AUTO: 2.73 10*3/MM3 (ref 1.7–7)
NEUTROPHILS NFR BLD AUTO: 47.7 % (ref 42.7–76)
NRBC BLD AUTO-RTO: 0 /100 WBC (ref 0–0.2)
PLATELET # BLD AUTO: 265 10*3/MM3 (ref 140–450)
PMV BLD AUTO: ABNORMAL FL
POTASSIUM SERPL-SCNC: 4.5 MMOL/L (ref 3.5–5.2)
PROT SERPL-MCNC: 7.4 G/DL (ref 6–8.5)
PROTHROMBIN TIME: 17.3 SECONDS (ref 11.9–14.6)
RBC # BLD AUTO: 4.84 10*6/MM3 (ref 3.77–5.28)
SODIUM SERPL-SCNC: 140 MMOL/L (ref 136–145)
TIBC SERPL-MCNC: 498 MCG/DL (ref 298–536)
TRANSFERRIN SERPL-MCNC: 334 MG/DL (ref 200–360)
VIT B12 BLD-MCNC: 1383 PG/ML (ref 211–946)
WBC NRBC COR # BLD: 5.73 10*3/MM3 (ref 3.4–10.8)

## 2022-01-10 PROCEDURE — 85302 CLOT INHIBIT PROT C ANTIGEN: CPT

## 2022-01-10 PROCEDURE — 82746 ASSAY OF FOLIC ACID SERUM: CPT

## 2022-01-10 PROCEDURE — 86146 BETA-2 GLYCOPROTEIN ANTIBODY: CPT

## 2022-01-10 PROCEDURE — 83540 ASSAY OF IRON: CPT

## 2022-01-10 PROCEDURE — 85705 THROMBOPLASTIN INHIBITION: CPT

## 2022-01-10 PROCEDURE — 85613 RUSSELL VIPER VENOM DILUTED: CPT

## 2022-01-10 PROCEDURE — 36415 COLL VENOUS BLD VENIPUNCTURE: CPT

## 2022-01-10 PROCEDURE — 82728 ASSAY OF FERRITIN: CPT

## 2022-01-10 PROCEDURE — 81241 F5 GENE: CPT

## 2022-01-10 PROCEDURE — 85300 ANTITHROMBIN III ACTIVITY: CPT

## 2022-01-10 PROCEDURE — 85670 THROMBIN TIME PLASMA: CPT

## 2022-01-10 PROCEDURE — 85303 CLOT INHIBIT PROT C ACTIVITY: CPT

## 2022-01-10 PROCEDURE — 84466 ASSAY OF TRANSFERRIN: CPT

## 2022-01-10 PROCEDURE — 85610 PROTHROMBIN TIME: CPT

## 2022-01-10 PROCEDURE — 99215 OFFICE O/P EST HI 40 MIN: CPT | Performed by: INTERNAL MEDICINE

## 2022-01-10 PROCEDURE — 80053 COMPREHEN METABOLIC PANEL: CPT

## 2022-01-10 PROCEDURE — 85305 CLOT INHIBIT PROT S TOTAL: CPT

## 2022-01-10 PROCEDURE — 82607 VITAMIN B-12: CPT

## 2022-01-10 PROCEDURE — 85306 CLOT INHIBIT PROT S FREE: CPT

## 2022-01-10 PROCEDURE — 85730 THROMBOPLASTIN TIME PARTIAL: CPT

## 2022-01-10 PROCEDURE — 85732 THROMBOPLASTIN TIME PARTIAL: CPT

## 2022-01-10 PROCEDURE — 86147 CARDIOLIPIN ANTIBODY EA IG: CPT

## 2022-01-10 PROCEDURE — 85025 COMPLETE CBC W/AUTO DIFF WBC: CPT

## 2022-01-10 PROCEDURE — 85301 ANTITHROMBIN III ANTIGEN: CPT

## 2022-01-10 RX ORDER — DIPHENHYDRAMINE HYDROCHLORIDE 50 MG/ML
50 INJECTION INTRAMUSCULAR; INTRAVENOUS AS NEEDED
Status: CANCELLED | OUTPATIENT
Start: 2022-01-14

## 2022-01-10 RX ORDER — FAMOTIDINE 10 MG/ML
20 INJECTION, SOLUTION INTRAVENOUS AS NEEDED
Status: CANCELLED | OUTPATIENT
Start: 2022-03-03

## 2022-01-10 RX ORDER — SODIUM CHLORIDE 9 MG/ML
250 INJECTION, SOLUTION INTRAVENOUS ONCE
Status: CANCELLED | OUTPATIENT
Start: 2022-01-14

## 2022-01-10 RX ORDER — SODIUM CHLORIDE 9 MG/ML
250 INJECTION, SOLUTION INTRAVENOUS ONCE
Status: CANCELLED | OUTPATIENT
Start: 2022-01-12

## 2022-01-10 RX ORDER — FAMOTIDINE 10 MG/ML
20 INJECTION, SOLUTION INTRAVENOUS AS NEEDED
Status: CANCELLED | OUTPATIENT
Start: 2022-02-11

## 2022-01-10 RX ORDER — FAMOTIDINE 10 MG/ML
20 INJECTION, SOLUTION INTRAVENOUS AS NEEDED
Status: CANCELLED | OUTPATIENT
Start: 2022-02-22

## 2022-01-10 RX ORDER — SODIUM CHLORIDE 9 MG/ML
250 INJECTION, SOLUTION INTRAVENOUS ONCE
Status: CANCELLED | OUTPATIENT
Start: 2022-02-20

## 2022-01-10 RX ORDER — FAMOTIDINE 10 MG/ML
20 INJECTION, SOLUTION INTRAVENOUS AS NEEDED
Status: CANCELLED | OUTPATIENT
Start: 2022-02-20

## 2022-01-10 RX ORDER — FAMOTIDINE 10 MG/ML
20 INJECTION, SOLUTION INTRAVENOUS AS NEEDED
Status: CANCELLED | OUTPATIENT
Start: 2022-01-12

## 2022-01-10 RX ORDER — DIPHENHYDRAMINE HYDROCHLORIDE 50 MG/ML
50 INJECTION INTRAMUSCULAR; INTRAVENOUS AS NEEDED
Status: CANCELLED | OUTPATIENT
Start: 2022-02-22

## 2022-01-10 RX ORDER — DIPHENHYDRAMINE HYDROCHLORIDE 50 MG/ML
50 INJECTION INTRAMUSCULAR; INTRAVENOUS AS NEEDED
Status: CANCELLED | OUTPATIENT
Start: 2022-03-03

## 2022-01-10 RX ORDER — FAMOTIDINE 10 MG/ML
20 INJECTION, SOLUTION INTRAVENOUS AS NEEDED
OUTPATIENT
Start: 2022-03-05

## 2022-01-10 RX ORDER — SODIUM CHLORIDE 9 MG/ML
250 INJECTION, SOLUTION INTRAVENOUS ONCE
OUTPATIENT
Start: 2022-03-05

## 2022-01-10 RX ORDER — SODIUM CHLORIDE 9 MG/ML
250 INJECTION, SOLUTION INTRAVENOUS ONCE
Status: CANCELLED | OUTPATIENT
Start: 2022-02-11

## 2022-01-10 RX ORDER — FAMOTIDINE 10 MG/ML
20 INJECTION, SOLUTION INTRAVENOUS AS NEEDED
Status: CANCELLED | OUTPATIENT
Start: 2022-01-14

## 2022-01-10 RX ORDER — DIPHENHYDRAMINE HYDROCHLORIDE 50 MG/ML
50 INJECTION INTRAMUSCULAR; INTRAVENOUS AS NEEDED
Status: CANCELLED | OUTPATIENT
Start: 2022-02-20

## 2022-01-10 RX ORDER — DIPHENHYDRAMINE HYDROCHLORIDE 50 MG/ML
50 INJECTION INTRAMUSCULAR; INTRAVENOUS AS NEEDED
OUTPATIENT
Start: 2022-03-05

## 2022-01-10 RX ORDER — DIPHENHYDRAMINE HYDROCHLORIDE 50 MG/ML
50 INJECTION INTRAMUSCULAR; INTRAVENOUS AS NEEDED
Status: CANCELLED | OUTPATIENT
Start: 2022-02-11

## 2022-01-10 RX ORDER — DIPHENHYDRAMINE HYDROCHLORIDE 50 MG/ML
50 INJECTION INTRAMUSCULAR; INTRAVENOUS AS NEEDED
Status: CANCELLED | OUTPATIENT
Start: 2022-01-12

## 2022-01-10 RX ORDER — SODIUM CHLORIDE 9 MG/ML
250 INJECTION, SOLUTION INTRAVENOUS ONCE
Status: CANCELLED | OUTPATIENT
Start: 2022-03-03

## 2022-01-10 RX ORDER — CYANOCOBALAMIN 1000 UG/ML
INJECTION, SOLUTION INTRAMUSCULAR; SUBCUTANEOUS
COMMUNITY
Start: 2021-12-23 | End: 2022-01-25

## 2022-01-10 RX ORDER — SODIUM CHLORIDE 9 MG/ML
250 INJECTION, SOLUTION INTRAVENOUS ONCE
Status: CANCELLED | OUTPATIENT
Start: 2022-02-22

## 2022-01-11 ENCOUNTER — TELEPHONE (OUTPATIENT)
Dept: ONCOLOGY | Facility: CLINIC | Age: 52
End: 2022-01-11

## 2022-01-11 LAB
AT III ACT/NOR PPP CHRO: 154 % (ref 75–135)
AT III AG ACT/NOR PPP IA: 110 % (ref 72–124)
CARDIOLIPIN IGG SER IA-ACNC: <9 GPL U/ML (ref 0–14)
CARDIOLIPIN IGM SER IA-ACNC: 12 MPL U/ML (ref 0–12)
PROT S ACT/NOR PPP: 123 % (ref 63–140)
PROT S AG ACT/NOR PPP IA: 127 % (ref 60–150)
PROT S FREE AG ACT/NOR PPP IA: 145 % (ref 61–136)

## 2022-01-11 RX ORDER — FOLIC ACID 1 MG/1
1 TABLET ORAL DAILY
Qty: 30 TABLET | Refills: 12 | Status: SHIPPED | OUTPATIENT
Start: 2022-01-11

## 2022-01-11 NOTE — TELEPHONE ENCOUNTER
WARMED TRANSFERRED PATIENT TO [facility] MD CLINICAL STAFF DUE TO PATIENT RETURNING MISSED PHONE CALL.            [facility]

## 2022-01-11 NOTE — TELEPHONE ENCOUNTER
Notified pt that she can  folic acid from [facility]. She v/u    ----- Message from Amit Goldberg, MD sent at 1/11/2022  6:41 AM CST -----  Please call patient and tell her that I sent the prescription to her pharmacy for 1 mg of folic acid daily she should take that for life

## 2022-01-12 ENCOUNTER — INFUSION (OUTPATIENT)
Dept: ONCOLOGY | Facility: HOSPITAL | Age: 52
End: 2022-01-12

## 2022-01-12 VITALS
OXYGEN SATURATION: 96 % | BODY MASS INDEX: 43.94 KG/M2 | DIASTOLIC BLOOD PRESSURE: 85 MMHG | SYSTOLIC BLOOD PRESSURE: 109 MMHG | HEIGHT: 63 IN | TEMPERATURE: 97.6 F | WEIGHT: 248 LBS | HEART RATE: 97 BPM | RESPIRATION RATE: 17 BRPM

## 2022-01-12 DIAGNOSIS — D50.9 IRON DEFICIENCY ANEMIA, UNSPECIFIED IRON DEFICIENCY ANEMIA TYPE: Primary | ICD-10-CM

## 2022-01-12 LAB
APTT SCREEN TO CONFIRM RATIO: 1.09 RATIO (ref 0–1.34)
B2 GLYCOPROT1 IGA SER-ACNC: <9 GPI IGA UNITS (ref 0–25)
B2 GLYCOPROT1 IGG SER-ACNC: <9 GPI IGG UNITS (ref 0–20)
B2 GLYCOPROT1 IGM SER-ACNC: 13 GPI IGM UNITS (ref 0–32)
CONFIRM APTT/NORMAL: 50.2 SEC (ref 0–47.6)
DRVVT SCREEN TO CONFIRM RATIO: 1.7 RATIO (ref 0.8–1.2)
F5 GENE MUT ANL BLD/T: NORMAL
LA 2 SCREEN W REFLEX-IMP: ABNORMAL
MIXING DRVVT: 73.6 SEC (ref 0–40.4)
PROT C ACT/NOR PPP: 190 % (ref 73–180)
PROT C AG ACT/NOR PPP IA: 165 % (ref 60–150)
SCREEN APTT: 50.6 SEC (ref 0–51.9)
SCREEN DRVVT: 111.5 SEC (ref 0–47)
THROMBIN TIME: 17.5 SEC (ref 0–23)

## 2022-01-12 PROCEDURE — 25010000002 IRON SUCROSE PER 1 MG: Performed by: INTERNAL MEDICINE

## 2022-01-12 PROCEDURE — 96365 THER/PROPH/DIAG IV INF INIT: CPT

## 2022-01-12 RX ORDER — FAMOTIDINE 10 MG/ML
20 INJECTION, SOLUTION INTRAVENOUS AS NEEDED
Status: DISCONTINUED | OUTPATIENT
Start: 2022-01-12 | End: 2022-01-12 | Stop reason: HOSPADM

## 2022-01-12 RX ORDER — SODIUM CHLORIDE 9 MG/ML
250 INJECTION, SOLUTION INTRAVENOUS ONCE
Status: COMPLETED | OUTPATIENT
Start: 2022-01-12 | End: 2022-01-12

## 2022-01-12 RX ORDER — DIPHENHYDRAMINE HYDROCHLORIDE 50 MG/ML
50 INJECTION INTRAMUSCULAR; INTRAVENOUS AS NEEDED
Status: DISCONTINUED | OUTPATIENT
Start: 2022-01-12 | End: 2022-01-12 | Stop reason: HOSPADM

## 2022-01-12 RX ADMIN — IRON SUCROSE 200 MG: 20 INJECTION, SOLUTION INTRAVENOUS at 14:39

## 2022-01-12 RX ADMIN — SODIUM CHLORIDE 250 ML: 9 INJECTION, SOLUTION INTRAVENOUS at 14:39

## 2022-01-17 ENCOUNTER — INFUSION (OUTPATIENT)
Dept: ONCOLOGY | Facility: HOSPITAL | Age: 52
End: 2022-01-17

## 2022-01-17 VITALS
HEART RATE: 77 BPM | WEIGHT: 247 LBS | DIASTOLIC BLOOD PRESSURE: 85 MMHG | SYSTOLIC BLOOD PRESSURE: 119 MMHG | HEIGHT: 63 IN | BODY MASS INDEX: 43.77 KG/M2 | OXYGEN SATURATION: 97 % | RESPIRATION RATE: 18 BRPM

## 2022-01-17 DIAGNOSIS — D50.9 IRON DEFICIENCY ANEMIA, UNSPECIFIED IRON DEFICIENCY ANEMIA TYPE: Primary | ICD-10-CM

## 2022-01-17 PROCEDURE — 96365 THER/PROPH/DIAG IV INF INIT: CPT

## 2022-01-17 PROCEDURE — 25010000002 IRON SUCROSE PER 1 MG: Performed by: INTERNAL MEDICINE

## 2022-01-17 RX ORDER — FAMOTIDINE 10 MG/ML
20 INJECTION, SOLUTION INTRAVENOUS AS NEEDED
Status: DISCONTINUED | OUTPATIENT
Start: 2022-01-17 | End: 2022-01-17 | Stop reason: HOSPADM

## 2022-01-17 RX ORDER — SODIUM CHLORIDE 9 MG/ML
250 INJECTION, SOLUTION INTRAVENOUS ONCE
Status: COMPLETED | OUTPATIENT
Start: 2022-01-17 | End: 2022-01-17

## 2022-01-17 RX ORDER — DIPHENHYDRAMINE HYDROCHLORIDE 50 MG/ML
50 INJECTION INTRAMUSCULAR; INTRAVENOUS AS NEEDED
Status: DISCONTINUED | OUTPATIENT
Start: 2022-01-17 | End: 2022-01-17 | Stop reason: HOSPADM

## 2022-01-17 RX ADMIN — SODIUM CHLORIDE 250 ML: 9 INJECTION, SOLUTION INTRAVENOUS at 14:41

## 2022-01-17 RX ADMIN — IRON SUCROSE 200 MG: 20 INJECTION, SOLUTION INTRAVENOUS at 14:41

## 2022-01-19 ENCOUNTER — APPOINTMENT (OUTPATIENT)
Dept: ONCOLOGY | Facility: HOSPITAL | Age: 52
End: 2022-01-19

## 2022-01-20 ENCOUNTER — APPOINTMENT (OUTPATIENT)
Dept: MAMMOGRAPHY | Facility: HOSPITAL | Age: 52
End: 2022-01-20

## 2022-01-21 ENCOUNTER — APPOINTMENT (OUTPATIENT)
Dept: ONCOLOGY | Facility: HOSPITAL | Age: 52
End: 2022-01-21

## 2022-01-24 ENCOUNTER — APPOINTMENT (OUTPATIENT)
Dept: ONCOLOGY | Facility: HOSPITAL | Age: 52
End: 2022-01-24

## 2022-01-24 ENCOUNTER — TELEPHONE (OUTPATIENT)
Dept: ONCOLOGY | Facility: CLINIC | Age: 52
End: 2022-01-24

## 2022-01-25 RX ORDER — CYANOCOBALAMIN 1000 UG/ML
INJECTION, SOLUTION INTRAMUSCULAR; SUBCUTANEOUS
Qty: 9 ML | Refills: 0 | Status: SHIPPED | OUTPATIENT
Start: 2022-01-25

## 2022-01-26 ENCOUNTER — APPOINTMENT (OUTPATIENT)
Dept: ONCOLOGY | Facility: HOSPITAL | Age: 52
End: 2022-01-26

## 2022-01-26 ENCOUNTER — APPOINTMENT (OUTPATIENT)
Dept: MAMMOGRAPHY | Facility: HOSPITAL | Age: 52
End: 2022-01-26

## 2022-02-11 ENCOUNTER — APPOINTMENT (OUTPATIENT)
Dept: MAMMOGRAPHY | Facility: HOSPITAL | Age: 52
End: 2022-02-11

## 2022-02-11 ENCOUNTER — APPOINTMENT (OUTPATIENT)
Dept: ONCOLOGY | Facility: HOSPITAL | Age: 52
End: 2022-02-11

## 2022-02-14 ENCOUNTER — INFUSION (OUTPATIENT)
Dept: ONCOLOGY | Facility: HOSPITAL | Age: 52
End: 2022-02-14

## 2022-02-14 ENCOUNTER — DOCUMENTATION (OUTPATIENT)
Dept: PASTORAL CARE | Facility: HOSPITAL | Age: 52
End: 2022-02-14

## 2022-02-14 ENCOUNTER — HOSPITAL ENCOUNTER (OUTPATIENT)
Dept: MAMMOGRAPHY | Facility: HOSPITAL | Age: 52
Discharge: HOME | End: 2022-02-14
Admitting: INTERNAL MEDICINE

## 2022-02-14 VITALS
DIASTOLIC BLOOD PRESSURE: 83 MMHG | RESPIRATION RATE: 18 BRPM | HEIGHT: 63 IN | TEMPERATURE: 97.8 F | SYSTOLIC BLOOD PRESSURE: 120 MMHG | WEIGHT: 246 LBS | HEART RATE: 85 BPM | BODY MASS INDEX: 43.59 KG/M2 | OXYGEN SATURATION: 99 %

## 2022-02-14 DIAGNOSIS — D50.9 IRON DEFICIENCY ANEMIA, UNSPECIFIED IRON DEFICIENCY ANEMIA TYPE: Primary | ICD-10-CM

## 2022-02-14 DIAGNOSIS — Z12.31 ENCOUNTER FOR SCREENING MAMMOGRAM FOR MALIGNANT NEOPLASM OF BREAST: ICD-10-CM

## 2022-02-14 DIAGNOSIS — Z13.9 SCREENING DUE: ICD-10-CM

## 2022-02-14 PROCEDURE — 96365 THER/PROPH/DIAG IV INF INIT: CPT

## 2022-02-14 PROCEDURE — 25010000002 IRON SUCROSE PER 1 MG: Performed by: INTERNAL MEDICINE

## 2022-02-14 PROCEDURE — 77063 BREAST TOMOSYNTHESIS BI: CPT

## 2022-02-14 PROCEDURE — 77067 SCR MAMMO BI INCL CAD: CPT

## 2022-02-14 RX ORDER — FAMOTIDINE 10 MG/ML
20 INJECTION, SOLUTION INTRAVENOUS AS NEEDED
Status: DISCONTINUED | OUTPATIENT
Start: 2022-02-14 | End: 2022-02-14 | Stop reason: HOSPADM

## 2022-02-14 RX ORDER — SODIUM CHLORIDE 9 MG/ML
250 INJECTION, SOLUTION INTRAVENOUS ONCE
Status: COMPLETED | OUTPATIENT
Start: 2022-02-14 | End: 2022-02-14

## 2022-02-14 RX ORDER — DIPHENHYDRAMINE HYDROCHLORIDE 50 MG/ML
50 INJECTION INTRAMUSCULAR; INTRAVENOUS AS NEEDED
Status: DISCONTINUED | OUTPATIENT
Start: 2022-02-14 | End: 2022-02-14 | Stop reason: HOSPADM

## 2022-02-14 RX ADMIN — IRON SUCROSE 200 MG: 20 INJECTION, SOLUTION INTRAVENOUS at 14:11

## 2022-02-14 RX ADMIN — SODIUM CHLORIDE 250 ML: 9 INJECTION, SOLUTION INTRAVENOUS at 13:53

## 2022-02-14 NOTE — ACP (ADVANCE CARE PLANNING)
Date of [facility] ACP interview: 2/14/2022  Location of interview: Oncology  [facility] ACP Facilitator: Shanae Mena, RN  Referral Source: Patient request  Present for facilitation: Patient    SUMMARY OF ADVANCE CARE PLANNING DISCUSSION:  Yecenia presents for [facility] facilitation. We reviewed purpose and goals for advance care planning.    I reviewed with Yecenia qualities to consider when choosing a healthcare agent. Yecenia had selected her sister Pamela as her healthcare agent. I encouraged Yecenia to discuss her preferences for future care with healthcare agents and others close to her.    Yecenia describes past experiences dealing with friends or family who have been seriously ill:She has six siblings, but only one she prefers to make decisions for her.  From these experiences Yecenia learned She wished  To have her wishes documented.        Yecenia describes cultural, Hindu, spiritual or personal practices/beliefs that are important to her or might help her choose the care wanted, or not wanted: None      Each section of the advance care/living will document was reviewed and discussed.    Advance care/living will document finished during this facilitation? yes    Time spent on preparation, facilitation and documentation was 31-60 minutes.    RECOMMENDATIONS/FOLLOW-UP:  Completed, faxed to HIM, copies to patient.    CONSULT/NOTE ROUTED  yes    Shanae Mena, RN

## 2022-02-18 ENCOUNTER — APPOINTMENT (OUTPATIENT)
Dept: ONCOLOGY | Facility: HOSPITAL | Age: 52
End: 2022-02-18

## 2022-03-03 ENCOUNTER — INFUSION (OUTPATIENT)
Dept: ONCOLOGY | Facility: HOSPITAL | Age: 52
End: 2022-03-03

## 2022-03-03 VITALS
RESPIRATION RATE: 16 BRPM | BODY MASS INDEX: 42.88 KG/M2 | DIASTOLIC BLOOD PRESSURE: 67 MMHG | SYSTOLIC BLOOD PRESSURE: 103 MMHG | TEMPERATURE: 97.3 F | OXYGEN SATURATION: 100 % | HEART RATE: 69 BPM | WEIGHT: 242 LBS | HEIGHT: 63 IN

## 2022-03-03 DIAGNOSIS — D50.9 IRON DEFICIENCY ANEMIA, UNSPECIFIED IRON DEFICIENCY ANEMIA TYPE: Primary | ICD-10-CM

## 2022-03-03 PROCEDURE — 96365 THER/PROPH/DIAG IV INF INIT: CPT

## 2022-03-03 PROCEDURE — 25010000002 IRON SUCROSE PER 1 MG: Performed by: INTERNAL MEDICINE

## 2022-03-03 RX ORDER — FAMOTIDINE 10 MG/ML
20 INJECTION, SOLUTION INTRAVENOUS AS NEEDED
Status: DISCONTINUED | OUTPATIENT
Start: 2022-03-03 | End: 2022-03-03 | Stop reason: HOSPADM

## 2022-03-03 RX ORDER — DIPHENHYDRAMINE HYDROCHLORIDE 50 MG/ML
50 INJECTION INTRAMUSCULAR; INTRAVENOUS AS NEEDED
Status: DISCONTINUED | OUTPATIENT
Start: 2022-03-03 | End: 2022-03-03 | Stop reason: HOSPADM

## 2022-03-03 RX ORDER — SODIUM CHLORIDE 9 MG/ML
250 INJECTION, SOLUTION INTRAVENOUS ONCE
Status: COMPLETED | OUTPATIENT
Start: 2022-03-03 | End: 2022-03-03

## 2022-03-03 RX ADMIN — IRON SUCROSE 200 MG: 20 INJECTION, SOLUTION INTRAVENOUS at 14:07

## 2022-03-03 RX ADMIN — SODIUM CHLORIDE 250 ML: 9 INJECTION, SOLUTION INTRAVENOUS at 14:08

## 2022-04-14 ENCOUNTER — APPOINTMENT (OUTPATIENT)
Dept: LAB | Facility: HOSPITAL | Age: 52
End: 2022-04-14

## 2022-08-25 ENCOUNTER — TRANSCRIBE ORDERS (OUTPATIENT)
Dept: ADMINISTRATIVE | Facility: HOSPITAL | Age: 52
End: 2022-08-25

## 2022-08-25 DIAGNOSIS — M25.562 LEFT KNEE PAIN, UNSPECIFIED CHRONICITY: Primary | ICD-10-CM

## 2022-08-31 ENCOUNTER — APPOINTMENT (OUTPATIENT)
Dept: CT IMAGING | Facility: HOSPITAL | Age: 52
End: 2022-08-31

## 2022-09-02 ENCOUNTER — HOSPITAL ENCOUNTER (OUTPATIENT)
Dept: CT IMAGING | Facility: HOSPITAL | Age: 52
Discharge: HOME | End: 2022-09-02
Admitting: GENERAL PRACTICE

## 2022-09-02 ENCOUNTER — HOSPITAL ENCOUNTER (OUTPATIENT)
Dept: CT IMAGING | Facility: HOSPITAL | Age: 52
End: 2022-09-02

## 2022-09-02 DIAGNOSIS — M25.562 LEFT KNEE PAIN, UNSPECIFIED CHRONICITY: ICD-10-CM

## 2022-09-02 PROCEDURE — 73700 CT LOWER EXTREMITY W/O DYE: CPT

## 2022-10-15 ENCOUNTER — HOSPITAL ENCOUNTER (EMERGENCY)
Facility: HOSPITAL | Age: 52
Discharge: HOME | End: 2022-10-16
Attending: STUDENT IN AN ORGANIZED HEALTH CARE EDUCATION/TRAINING PROGRAM | Admitting: STUDENT IN AN ORGANIZED HEALTH CARE EDUCATION/TRAINING PROGRAM

## 2022-10-15 ENCOUNTER — APPOINTMENT (OUTPATIENT)
Dept: CT IMAGING | Facility: HOSPITAL | Age: 52
End: 2022-10-15

## 2022-10-15 DIAGNOSIS — G89.29 CHRONIC BACK PAIN, UNSPECIFIED BACK LOCATION, UNSPECIFIED BACK PAIN LATERALITY: ICD-10-CM

## 2022-10-15 DIAGNOSIS — H60.503 ACUTE OTITIS EXTERNA OF BOTH EARS, UNSPECIFIED TYPE: Primary | ICD-10-CM

## 2022-10-15 DIAGNOSIS — M54.9 CHRONIC BACK PAIN, UNSPECIFIED BACK LOCATION, UNSPECIFIED BACK PAIN LATERALITY: ICD-10-CM

## 2022-10-15 PROCEDURE — 70450 CT HEAD/BRAIN W/O DYE: CPT

## 2022-10-15 PROCEDURE — 99283 EMERGENCY DEPT VISIT LOW MDM: CPT

## 2022-10-15 PROCEDURE — 72125 CT NECK SPINE W/O DYE: CPT

## 2022-10-16 VITALS
HEART RATE: 80 BPM | OXYGEN SATURATION: 97 % | SYSTOLIC BLOOD PRESSURE: 92 MMHG | TEMPERATURE: 98.1 F | HEIGHT: 63 IN | WEIGHT: 230 LBS | BODY MASS INDEX: 40.75 KG/M2 | RESPIRATION RATE: 18 BRPM | DIASTOLIC BLOOD PRESSURE: 64 MMHG

## 2022-10-16 RX ORDER — CIPROFLOXACIN AND DEXAMETHASONE 3; 1 MG/ML; MG/ML
4 SUSPENSION/ DROPS AURICULAR (OTIC) 2 TIMES DAILY
Qty: 7.5 ML | Refills: 0 | Status: SHIPPED | OUTPATIENT
Start: 2022-10-16 | End: 2022-10-23

## 2022-10-16 NOTE — ED PROVIDER NOTES
Subjective   History of Present Illness  Patient states that she has a history of some knots that come up on her spine that come and go.  States that recently she noticed some swelling over the top of her upper back.  States that she is also been having some pain in both of her ears.  States that she does take baths in a bathtub.  Denies any chest pain or shortness of breath or numbness or tingling at this time.  Denies any headache or vision changes or difficulty speaking or any difficulty walking.  States that she has a history of seizures and heart failure and history of stroke and is on blood thinners.  Denies any recent falls or recent fevers or chills.        Review of Systems   All other systems reviewed and are negative.      Past Medical History:   Diagnosis Date   • Anxiety    • CHF (congestive heart failure) ([facility])    • Diabetes mellitus ([facility])    • GERD (gastroesophageal reflux disease)    • Hypertension    • Injury of back    • Myocardial infarction ([facility])    • Neuropathy     mostly in the LE's   • Seizures ([facility])    • Spondylisthesis    • Stroke ([facility]ly [facility])        Allergies   Allergen Reactions   • Bee Venom Swelling   • Erythromycin Shortness Of Breath   • Penicillins Hives   • Ampicillin Hives       Past Surgical History:   Procedure Laterality Date   • BREAST SURGERY      reduction and lift   • CHOLECYSTECTOMY     • DILATATION AND CURETTAGE     • GASTRIC SLEEVE LAPAROSCOPIC  2016   • INSERT / REPLACE / REMOVE PACEMAKER      at 23 years old due to syncope   • PACEMAKER IMPLANTATION     • REDUCTION MAMMAPLASTY         Family History   Problem Relation Age of Onset   • Stroke Mother    • Kidney failure Mother    • Diabetes Mother    • Miscarriages / Stillbirths Mother         x2   • Cancer Father 60        brain   • Diabetes Sister    • Hypertension Sister    • Kidney disease Brother    • Depression Brother    • Miscarriages / Stillbirths Maternal Grandmother         x3   • Heart attack Sister    • Transient  ischemic attack Sister         non-hemorrhagoic   • Multiple sclerosis Sister    • Miscarriages / Stillbirths Sister         x1   • Diabetes Sister    • Obesity Sister    • Diabetes Sister    • Stroke Sister    • Diabetes Brother    • Obesity Brother    • Gout Brother    • Breast cancer Maternal Aunt        Social History     Socioeconomic History   • Marital status:    Tobacco Use   • Smoking status: Every Day     Packs/day: 1.00     Years: 30.00     Pack years: 30.00     Types: Cigarettes   • Smokeless tobacco: Never   Substance and Sexual Activity   • Alcohol use: No   • Drug use: No   • Sexual activity: Defer           Objective   Physical Exam  Vitals and nursing note reviewed.   Constitutional:       General: She is not in acute distress.     Appearance: Normal appearance. She is not toxic-appearing or diaphoretic.   HENT:      Head: Normocephalic and atraumatic.      Right Ear: Tympanic membrane normal.      Left Ear: Tympanic membrane normal.      Ears:      Comments: Bilateral otitis externa without malignant     Nose: Nose normal.   Eyes:      General:         Right eye: No discharge.         Left eye: No discharge.      Extraocular Movements: Extraocular movements intact.      Conjunctiva/sclera: Conjunctivae normal.   Cardiovascular:      Rate and Rhythm: Normal rate.   Pulmonary:      Effort: Pulmonary effort is normal. No respiratory distress.   Abdominal:      General: Abdomen is flat.   Musculoskeletal:         General: Tenderness (to cervical spine w/ mild area of swelling over C5/C6 spinous processes) present. Normal range of motion.      Cervical back: Normal range of motion.   Skin:     General: Skin is warm.   Neurological:      General: No focal deficit present.      Mental Status: She is alert and oriented to person, place, and time. Mental status is at baseline.   Psychiatric:         Mood and Affect: Mood normal.         Behavior: Behavior normal.         Thought Content: Thought  content normal.         Judgment: Judgment normal.         Procedures           ED Course                                           MDM  Number of Diagnoses or Management Options  Acute otitis externa of both ears, unspecified type  Chronic back pain, unspecified back location, unspecified back pain laterality  Diagnosis management comments: This is a 52-year-old female presenting today with pain in her upper back and ear pain.  Given her history and physical examination plan to obtain a CT head, CT C-spine.  Patient CT scans not reveal any acute abnormalities.  She does have some mild soft tissue swelling in that area on examination.  She does not have any neurological deficits at this time.  We will treat her for acute otitis externa and have her follow-up with her primary care provider.  She voiced understanding of this. I reassessed the patient and discussed the findings of the work up so far. I told her that if her symptoms worsen or she develops neurological deficits she has to come back. I answered all her questions regarding the emergency department evaluation, diagnosis, and treatment plan in plain and simple language that she was able to understand.     She voiced agreement with the plan of care so far and had no further questions. I told her that there is always some diagnostic uncertainty in the ER and that her work up, physical exam, and even her current presentation may not always reveal other underlying conditions. I also went over the fact that her condition may change or show itself after being discharged. She expressed understanding and agreed that there are reasonable limitations with the practice of emergency medicine.    I gave her return precautions and told her to return to the emergency department within 24 - 48hrs if she has any new, worsening, or concerning symptoms. I told her that it is VERY IMPORTANT that she follows up (by calling to set up an appointment) with her primary care doctor  within the next few days or as soon as reasonably possible so that she can be re-evaluated for improvement in her symptoms or for any other questions. She verbalized understanding of these instructions.     She was discharged in stable condition and was observed ambulating out of the ER.        Final diagnoses:   Acute otitis externa of both ears, unspecified type   Chronic back pain, unspecified back location, unspecified back pain laterality       ED Disposition  ED Disposition     ED Disposition   Discharge    Condition   Stable    Comment   --             Sushil Nash MD  [address]  [place] [id number] [phone]    Call in 1 day  As needed, If symptoms worsen         Medication List      New Prescriptions    ciprofloxacin-dexamethasone 0.3-0.1 % otic suspension  Commonly known as: CIPRODEX  Administer 4 drops into the left ear 2 (Two) Times a Day for 7 days.           Where to Get Your Medications      These medications were sent to [facility] [place], [place] - [address] [place] [address] - [phone] [facility] [phone] FX  [address], [facility] [address]    Phone: [phone]   · ciprofloxacin-dexamethasone 0.3-0.1 % otic suspension          Anna Stephenson MD  10/16/22 0411

## 2022-10-16 NOTE — DISCHARGE INSTRUCTIONS
It was very nice to meet you, Yecenia. Thank you for allowing us to take care of you today at [facility].    Your work-up today did not show any emergent findings or emergent indications for admission to the hospital. Please understand that an ER evaluation is considered to be just the start of your evaluation. We will do what we can in one visit, but we are often unable to fully figure out what is causing your symptoms from one evaluation. Thus our primary goal is to determine whether you need to be evaluated in the hospital or if it is safe for you to go home and see other doctors such as a primary care physician or a specialist on an outpatient basis. A copy of your results should be included in your paperwork.     It is VERY IMPORTANT that you follow up (call them to set up an appointment) with your primary care doctor* within the next few days or as soon as possible so that you can be re-evaluated for improvement in your symptoms or for any other questions. If you were prescribed any medications, please take them as directed or call us back with any questions.     Please return to the emergency room within 12-48 hours if you experience fever, chills, chest pain or shortness of breath, pain with inspiration/expiration, pain that travels to your arms, neck or back, nausea, vomiting, severe headache, tearing pain in your chest, dizziness, feel as though you are about to pass out, have any worsening symptoms, or any other concerns.    *IMPORTANT INFORMATION REGARDING XRAYS AND CT SCANS*  Please keep in mind that at night time we do not have an in-house radiologist and use a Tele-radiology service. This means that while they provide us with information on emergent findings or acute findings, they may not report to us all of the other small findings that may be there on your imaging studies. While we will try our best to inform you about any incidental findings or abnormal findings that require follow up,  please follow up with your primary care provider to have your imaging studies reviewed formally and have any abnormal findings addressed.

## 2023-11-07 DIAGNOSIS — D50.9 IRON DEFICIENCY ANEMIA, UNSPECIFIED IRON DEFICIENCY ANEMIA TYPE: Primary | ICD-10-CM

## 2023-11-14 ENCOUNTER — OFFICE VISIT (OUTPATIENT)
Dept: NEUROLOGY | Facility: CLINIC | Age: 53
End: 2023-11-14
Payer: MEDICARE

## 2023-11-14 VITALS — HEIGHT: 63 IN | BODY MASS INDEX: 40.74 KG/M2

## 2023-11-14 DIAGNOSIS — G43.719 INTRACTABLE CHRONIC MIGRAINE WITHOUT AURA AND WITHOUT STATUS MIGRAINOSUS: Primary | ICD-10-CM

## 2023-11-14 DIAGNOSIS — R68.89 SPELLS OF DECREASED ATTENTIVENESS: ICD-10-CM

## 2023-11-14 PROCEDURE — 99204 OFFICE O/P NEW MOD 45 MIN: CPT | Performed by: PSYCHIATRY & NEUROLOGY

## 2023-11-14 PROCEDURE — 1159F MED LIST DOCD IN RCRD: CPT | Performed by: PSYCHIATRY & NEUROLOGY

## 2023-11-14 PROCEDURE — 1160F RVW MEDS BY RX/DR IN RCRD: CPT | Performed by: PSYCHIATRY & NEUROLOGY

## 2023-11-14 RX ORDER — RIMEGEPANT SULFATE 75 MG/75MG
75 TABLET, ORALLY DISINTEGRATING ORAL ONCE AS NEEDED
Qty: 16 TABLET | Refills: 5 | Status: SHIPPED | OUTPATIENT
Start: 2023-11-14

## 2023-11-14 RX ORDER — ATOGEPANT 60 MG/1
60 TABLET ORAL DAILY
Qty: 30 TABLET | Refills: 5 | Status: SHIPPED | OUTPATIENT
Start: 2023-11-14

## 2023-11-14 RX ORDER — PROMETHAZINE HYDROCHLORIDE 25 MG/1
12.5 TABLET ORAL EVERY 6 HOURS PRN
COMMUNITY
Start: 2023-10-30

## 2023-11-14 NOTE — PROGRESS NOTES
Subjective        Yecenia Tabares presents to [facility] Neurology    History of Present Illness    The patient is a 53-year-old female with history of diabetes, hypertension, CAD, CHF, neuropathy, referred for evaluation of migraines. She is accompanied by an adult female.    Her doctor referred her to the clinic for headaches and migraines. She was on Topamax, but she quit taking it because it was not working. Her insurance would not cover Qulipta unless she sees a specialist. She took Qulipta for 2 months and noticed a benefit. She takes it every day. She started experiencing headaches approximately 10 years ago. She de Her headaches can last more than 8 hours, and she experiences nausea, sensitivity to light and sound. The pain radiates through her temple and her eyes starts twitching. Lately, if she can not get under control, her whole body shakes. She describes it as stabbing or pressure, mostly in the left temple and left eye.  She takes Tylenol if the Fioricet does not resolve the headache. She had an eye examination last year. She has a cataract in one eye and her vision is a little worse than last time. She experiences sensitivity to light and sound.    She reports previous mini-strokes. She had a seizure on her front porch one day in 2021. She fell out and went to the hospital. She received the Pfizer COVID-19 vaccine in 2020 and her health has declined since then. She had DVTs. She stutters occasionally and all of her medications dry up her mouth. She had lost her teeth after the seizure. She has experienced hand swelling over the last week or 2. She has arthritis which started affecting her a week ago. She had to cut her rings due to the inflamation. She reveals she had a few emergency room visits regarding swelling. She struggles to put words together, her pupils would be different, and she stares off in a direction. She was having a silent seizure rather than a stroke. She had a  "pacemaker placed when she was 53-years-old. She has never been able to get an MRI. Her last seizure was about a month ago. The seizures are still present but are not as frequent as when they first started.       The patient has several siblings who experience migraines. Her mother and brother experience headaches. Her mother has tremors.      Past Medical History:   Diagnosis Date    Anxiety     CHF (congestive heart failure)     Diabetes mellitus     GERD (gastroesophageal reflux disease)     Hypertension     Injury of back     Myocardial infarction     Neuropathy     mostly in the LE's    Seizures     Spondylisthesis     Stroke           Current Outpatient Medications:     albuterol (PROVENTIL) (2.5 MG/3ML) 0.083% nebulizer solution, Take 2.5 mg by nebulization Every 4 (Four) Hours As Needed for Wheezing., Disp: , Rfl:     albuterol sulfate  (90 Base) MCG/ACT inhaler, Inhale 2 puffs Every 4 (Four) Hours As Needed for Wheezing., Disp: , Rfl:     butalbital-acetaminophen-caffeine (FIORICET, ESGIC) -40 MG per tablet, Take 1 tablet by mouth Daily As Needed for Headache., Disp: , Rfl:     DULoxetine (CYMBALTA) 60 MG capsule, Take 90 mg by mouth Every Night. Pt takes a 30 mg and a 60 mg for a total of 90 mg, Disp: , Rfl:     escitalopram (LEXAPRO) 10 MG tablet, Take 1 tablet by mouth Daily., Disp: , Rfl:     gabapentin (NEURONTIN) 300 MG capsule, Take 1 capsule by mouth 3 (Three) Times a Day., Disp: , Rfl:     meclizine (ANTIVERT) 25 MG tablet, Take 1 tablet by mouth 3 (Three) Times a Day As Needed for Dizziness., Disp: , Rfl:     Needle, Disp, 25G X 1\" misc, 10 each Daily., Disp: 2 each, Rfl: 0    oxyCODONE-acetaminophen (PERCOCET) 7.5-325 MG per tablet, Take 1 tablet by mouth 3 (Three) Times a Day As Needed., Disp: , Rfl:     pantoprazole (PROTONIX) 40 MG EC tablet, Take 1 tablet by mouth 2 (Two) Times a Day., Disp: , Rfl:     promethazine (PHENERGAN) 25 MG tablet, Take 0.5 tablets by mouth Every 6 (Six) " "Hours As Needed for Nausea or Vomiting., Disp: , Rfl:     Rivaroxaban (XARELTO) tablet therapy pack starter pack, Take one 15 mg tablet twice daily with food for 21 days.  Followed by one 20 mg tablet by mouth once daily with food. Take as directed, Disp: , Rfl:     rOPINIRole (REQUIP) 1 MG tablet, Take 1 tablet by mouth 2 (Two) Times a Day. Take 1 hour before bedtime., Disp: , Rfl:        Objective   Vital Signs:   Ht 160 cm (63\")   BMI 40.74 kg/m²     Physical Exam  Constitutional:       General: She is awake.   Eyes:      Extraocular Movements: Extraocular movements intact.      Pupils: Pupils are equal, round, and reactive to light.   Neurological:      Mental Status: She is alert.      Motor: Motor strength is normal.     Deep Tendon Reflexes: Reflexes are normal and symmetric.   Psychiatric:         Speech: Speech normal.        Neurological Exam  Mental Status  Awake and alert. Oriented to person, place and time. Recent and remote memory are intact. Speech is normal. Language is fluent with no aphasia. Attention and concentration are normal. Fund of knowledge is appropriate for level of education.    Cranial Nerves  CN II: Visual fields full to confrontation.  CN III, IV, VI: Extraocular movements intact bilaterally. Pupils equal round and reactive to light bilaterally.  CN V: Facial sensation is normal.  CN VII: Full and symmetric facial movement.  CN IX, X: Palate elevates symmetrically  CN XI: Shoulder shrug strength is normal.  CN XII: Tongue midline without atrophy or fasciculations.  Dystonic movements of tongue  Face asymmetry due to previous staph infection.    Motor  Normal muscle bulk throughout. Normal muscle tone. No abnormal involuntary movements. Strength is 5/5 throughout all four extremities.    Sensory  Light touch is normal in upper and lower extremities.     Reflexes  Deep tendon reflexes are 2+ and symmetric in all four extremities.    Coordination  Right: Finger-to-nose normal.Left: " Finger-to-nose normal.    Gait  Casual gait is normal including stance, stride, and arm swing.      Result Review :                     Assessment and Plan     Assessment:    The patient is a 53-year-old female with history of DVT, currently on Xarelto, depression, referred for evaluation of chronic migraines. She also has several other reports of possible seizures and TIAs, although this diagnosis is not clear, but because of her having focal neurologic symptoms, triptans would be contraindicated. She has already failed topiramate. She is currently on duloxetine, gabapentin. She has asthma, so propranolol would be contraindicated. She did try Qulipta, had benefit, but then was unable to get covered further by her insurance. Since she had benefit with it, I think it is worth trying. She may have a component of medication overuse headache as well as she is using Tylenol and Fioricet most days for her headache. She also takes oxycodone chronically for chronic pain, which may be playing a role. The patient has known DESMOND and is not currently on CPAP, which may be worsening her headaches as well.     Plan:    1. The patient will restart Qulipta 60 mg daily.   2. Trial of Nurtec 75 mg at onset of migraine.   3. Reduce usage of Fioricet.   4. I will get previous records from [place] emergency room visits in the last few years regarding the possible TIAs and seizures. She can not have an MRI because she has a pacemaker. She had a CT scan about a year ago, which appears normal with no sign of stroke in the past. Routine EEG.  5. The patient will follow up in 3 months.        Follow Up   No follow-ups on file.  Patient was given instructions and counseling regarding her condition or for health maintenance advice. Please see specific information pulled into the AVS if appropriate.         Transcribed from ambient dictation for Jacque Maldonado MD by Cori Mtz.  11/14/23   18:53 CST    Patient or patient representative  verbalized consent to the visit recording.  I have personally performed the services described in this document as transcribed by the above individual, and it is both accurate and complete.

## 2023-11-15 ENCOUNTER — OFFICE VISIT (OUTPATIENT)
Dept: ONCOLOGY | Facility: CLINIC | Age: 53
End: 2023-11-15
Payer: MEDICARE

## 2023-11-15 ENCOUNTER — TELEPHONE (OUTPATIENT)
Dept: NEUROLOGY | Facility: CLINIC | Age: 53
End: 2023-11-15
Payer: MEDICARE

## 2023-11-15 ENCOUNTER — LAB (OUTPATIENT)
Dept: LAB | Facility: HOSPITAL | Age: 53
End: 2023-11-15
Payer: MEDICARE

## 2023-11-15 VITALS
WEIGHT: 243 LBS | OXYGEN SATURATION: 97 % | HEART RATE: 89 BPM | BODY MASS INDEX: 43.05 KG/M2 | TEMPERATURE: 96.7 F | RESPIRATION RATE: 16 BRPM | DIASTOLIC BLOOD PRESSURE: 76 MMHG | SYSTOLIC BLOOD PRESSURE: 140 MMHG | HEIGHT: 63 IN

## 2023-11-15 DIAGNOSIS — Z79.01 ANTICOAGULANT LONG-TERM USE: ICD-10-CM

## 2023-11-15 DIAGNOSIS — E53.8 LOW SERUM VITAMIN B12: ICD-10-CM

## 2023-11-15 DIAGNOSIS — D50.9 IRON DEFICIENCY ANEMIA, UNSPECIFIED IRON DEFICIENCY ANEMIA TYPE: Primary | ICD-10-CM

## 2023-11-15 DIAGNOSIS — Z86.718 HISTORY OF DVT (DEEP VEIN THROMBOSIS): ICD-10-CM

## 2023-11-15 LAB
ALBUMIN SERPL-MCNC: 3.7 G/DL (ref 3.5–5.2)
ALBUMIN/GLOB SERPL: 1.3 G/DL
ALP SERPL-CCNC: 73 U/L (ref 39–117)
ALT SERPL W P-5'-P-CCNC: 10 U/L (ref 1–33)
ANION GAP SERPL CALCULATED.3IONS-SCNC: 10 MMOL/L (ref 5–15)
ANISOCYTOSIS BLD QL: ABNORMAL
AST SERPL-CCNC: 11 U/L (ref 1–32)
BASOPHILS # BLD MANUAL: 0.07 10*3/MM3 (ref 0–0.2)
BASOPHILS NFR BLD MANUAL: 1 % (ref 0–1.5)
BILIRUB SERPL-MCNC: 0.2 MG/DL (ref 0–1.2)
BUN SERPL-MCNC: 14 MG/DL (ref 6–20)
BUN/CREAT SERPL: 20.3 (ref 7–25)
CALCIUM SPEC-SCNC: 8.7 MG/DL (ref 8.6–10.5)
CHLORIDE SERPL-SCNC: 108 MMOL/L (ref 98–107)
CO2 SERPL-SCNC: 24 MMOL/L (ref 22–29)
CREAT SERPL-MCNC: 0.69 MG/DL (ref 0.57–1)
DEPRECATED RDW RBC AUTO: 52.5 FL (ref 37–54)
EGFRCR SERPLBLD CKD-EPI 2021: 104.6 ML/MIN/1.73
EOSINOPHIL # BLD MANUAL: 0.78 10*3/MM3 (ref 0–0.4)
EOSINOPHIL NFR BLD MANUAL: 11 % (ref 0.3–6.2)
ERYTHROCYTE [DISTWIDTH] IN BLOOD BY AUTOMATED COUNT: 21.1 % (ref 12.3–15.4)
FERRITIN SERPL-MCNC: 10.09 NG/ML (ref 13–150)
GLOBULIN UR ELPH-MCNC: 2.9 GM/DL
GLUCOSE SERPL-MCNC: 104 MG/DL (ref 65–99)
HCT VFR BLD AUTO: 31.4 % (ref 34–46.6)
HGB BLD-MCNC: 8.7 G/DL (ref 12–15.9)
HOLD SPECIMEN: NORMAL
HYPOCHROMIA BLD QL: ABNORMAL
IRON 24H UR-MRATE: 16 MCG/DL (ref 37–145)
IRON SATN MFR SERPL: 3 % (ref 20–50)
LYMPHOCYTES # BLD MANUAL: 2.77 10*3/MM3 (ref 0.7–3.1)
LYMPHOCYTES NFR BLD MANUAL: 6 % (ref 5–12)
MCH RBC QN AUTO: 20 PG (ref 26.6–33)
MCHC RBC AUTO-ENTMCNC: 27.7 G/DL (ref 31.5–35.7)
MCV RBC AUTO: 72.2 FL (ref 79–97)
MICROCYTES BLD QL: ABNORMAL
MONOCYTES # BLD: 0.43 10*3/MM3 (ref 0.1–0.9)
NEUTROPHILS # BLD AUTO: 2.98 10*3/MM3 (ref 1.7–7)
NEUTROPHILS NFR BLD MANUAL: 41 % (ref 42.7–76)
NEUTS BAND NFR BLD MANUAL: 1 % (ref 0–5)
NRBC SPEC MANUAL: 1 /100 WBC (ref 0–0.2)
OVALOCYTES BLD QL SMEAR: ABNORMAL
PLASMA CELL PREC NFR BLD MANUAL: 1 % (ref 0–0)
PLAT MORPH BLD: NORMAL
PLATELET # BLD AUTO: 339 10*3/MM3 (ref 140–450)
POIKILOCYTOSIS BLD QL SMEAR: ABNORMAL
POLYCHROMASIA BLD QL SMEAR: ABNORMAL
POTASSIUM SERPL-SCNC: 4 MMOL/L (ref 3.5–5.2)
PROT SERPL-MCNC: 6.6 G/DL (ref 6–8.5)
RBC # BLD AUTO: 4.35 10*6/MM3 (ref 3.77–5.28)
ROULEAUX BLD QL SMEAR: ABNORMAL
SODIUM SERPL-SCNC: 142 MMOL/L (ref 136–145)
TIBC SERPL-MCNC: 475 MCG/DL (ref 298–536)
TRANSFERRIN SERPL-MCNC: 319 MG/DL (ref 200–360)
VARIANT LYMPHS NFR BLD MANUAL: 2 % (ref 0–5)
VARIANT LYMPHS NFR BLD MANUAL: 37 % (ref 19.6–45.3)
WBC MORPH BLD: NORMAL
WBC NRBC COR # BLD: 7.09 10*3/MM3 (ref 3.4–10.8)

## 2023-11-15 PROCEDURE — 84466 ASSAY OF TRANSFERRIN: CPT

## 2023-11-15 PROCEDURE — 82746 ASSAY OF FOLIC ACID SERUM: CPT

## 2023-11-15 PROCEDURE — 82728 ASSAY OF FERRITIN: CPT

## 2023-11-15 PROCEDURE — 80053 COMPREHEN METABOLIC PANEL: CPT

## 2023-11-15 PROCEDURE — 82607 VITAMIN B-12: CPT

## 2023-11-15 PROCEDURE — 85007 BL SMEAR W/DIFF WBC COUNT: CPT

## 2023-11-15 PROCEDURE — 36415 COLL VENOUS BLD VENIPUNCTURE: CPT

## 2023-11-15 PROCEDURE — 85025 COMPLETE CBC W/AUTO DIFF WBC: CPT

## 2023-11-15 PROCEDURE — 83540 ASSAY OF IRON: CPT

## 2023-11-15 NOTE — PROGRESS NOTES
[facility] HEMATOLOGY & ONCOLOGY  [address]  [facility] [address] [phone]    Patient Name: Yecenia Tabares  Encounter Date: 11/15/2023   YOB: 1970  Patient Number: [id number]    Hematology / Oncology Progress Note      HPI / REASON FOR VISIT: Yecenia Tabares is a 53 y.o. female initially seen by Dr. Espana for iron deficiency, pulmonary embolism and left leg DVT.  The patient presented to the hospital on 12/20/2021 with shortness of breath and was found to have an elevated D-dimer along with low iron studies.  A CT chest angiogram on 12/20/2021 showed diffuse bilateral pulmonary emboli involving the distal main pulmonary arteries extending into the subsegmental branches.  Mild right ventricular heart strain with RV to LV ratio measuring 1.41.  Scattered groundglass opacities may represent multifocal pneumonia and/or ischemic changes of the parenchyma.  The patient was started on a heparin drip.     Of note, the patient had J&J vaccine in April 2021 and reported difficulty since that time with a right facial droop and difficulty with her speech since May or June of this year (!!!)  She and her sister state that she has been sick since then on and off without stop and symptoms started 2 weeks after the vaccine. Her sister also states that she had a seizure at that time. She does have a history of syncope when she was younger. PPM was checked recently and as per patient and sister was told that it was working fine     Lower extremity Dopplers done on 12/21/21 showed evidence of a DVT in the common femoral, profunda femoris, superficial femoral, popliteal, peroneal and posterior tibial veins in the left lower extremity.  There was no evidence of a DVT in the right side.  The patient also was noted to have superficial thrombus in the saphenous junction in the left lower extremity.  She stated that around this time, she was  "mostly in bed due to thinking she had pneumonia.      In addition to all this, the patient was noted to have a hemoglobin of 6.8 with an MCV of 72.9.  She was given 1 unit of packed red blood cells and also was given Venofer 200 mg for 2 days (as per patient had 2 iron infusions although not documented in the Epic chart). Patient has a history of gastric sleeve procedure and may not absorb oral iron and will likely need IV Venofer transfusions as an outpatient     The patient denied any history of malignancy or previous hypercoagulable state but did state that her father had malignancy with brain involvement as well as family member with breast cancer.  The patient is a smoker.     The patient was started on Lovenox and then transitioned to Eliquis and then changed to  Xarelto (due to insurance) on 2021. She states that she takes the medications as directed without missing a dose.      She denies family history of clotting but there are many family members and the patient have had miscarriages (she personally had 2   In the early part of the pregnancies)     She complains of pain in the right lower leg \"burning if I keep it straight but goes away when I straighten the leg\".      She no longer gets menstruation (has not had one for 7-8 years). She denies bleeding anywhere else.  She denies any signs of PICA syndrome.      The inheritable thrombophilia panel includes the followin.  APC resistance/factor V Leiden WNL  2.  Prothrombin gene mutation  3.  Protein C functional assay shows elevation in protein C antigen at 165 and elevation of protein C functional at 190 --elevations in protein C are usually not clinically significant but may indicate chronic inflammation or inflammatory disorder  4. protein S free protein and antigen assay shows elevation of free antigen at 145 but normal total antigen at 127 and functional protein S at 123 -- elevations in protein C are usually not clinically significant but " may indicate chronic inflammation or inflammatory disorder  5. Antithrombin III -elevated at 154 with a normal antigen at 110 -elevations in Antithrombin III do not correspond with clotting disorder but may theoretically correspond to a bleeding disorder which this patient does not appear to have  6.  PT/PTT/INR  7.  Anticardiolipin antibody WNL  8.  Beta-2 glycoprotein antibodies WNL  9.  Lupus anticoagulant POSITIVE -- if positive again in 12 weeks, would qualify patient for APLA Syndrome and would recommend change of anticoagulants to coumadin.  However, please note, patient is taking DOAC which can interfere and give a false positive lupus anticoagulant. Please note:  LAC can be FALSELY ELEVATED in patients on DOAC.  In an attempt to make sure that the LAC is not a false positive, in those patients taking a DOAC, can take off for 48 hours and recheck the LAC     INTERVAL HISTORY     Pt presents to clinic today with her sister for continued follow up.  Her  last visit to this office was 11/15/23.  She continues to take Xarelto 20 mg daily w/o any s/s of bleeding or toxicity.   She complains of worsening fatigue.     She had labs drawn and results were reviewed with her in office.        LABS    Lab Results - Last 18 Months   Lab Units 11/22/23  0953 11/15/23  1329   HEMOGLOBIN g/dL 9.3* 8.7*   HEMATOCRIT % 34.9 31.4*   MCV fL 72.9* 72.2*   WBC 10*3/mm3 6.61 7.09   RDW % 21.0* 21.1*   PLATELETS 10*3/mm3 305 339   NEUTROS ABS 10*3/mm3 3.83 2.98   EOS ABS 10*3/mm3 0.53* 0.78*   BASOS ABS 10*3/mm3 0.07 0.07   NRBC /100 WBC  --  1.0*   NEUTROPHIL % % 57.0 41.0*   MONOCYTES % % 4.0* 6.0   BASOPHIL % % 1.0 1.0   ATYP LYMPH % % 4.0 2.0   ANISOCYTOSIS  Slight/1+ Slight/1+       Lab Results - Last 18 Months   Lab Units 11/15/23  1329   GLUCOSE mg/dL 104*   SODIUM mmol/L 142   POTASSIUM mmol/L 4.0   CO2 mmol/L 24.0   CHLORIDE mmol/L 108*   ANION GAP mmol/L 10.0   CREATININE mg/dL 0.69   BUN mg/dL 14   BUN / CREAT RATIO   "20.3   CALCIUM mg/dL 8.7   ALK PHOS U/L 73   TOTAL PROTEIN g/dL 6.6   ALT (SGPT) U/L 10   AST (SGOT) U/L 11   BILIRUBIN mg/dL 0.2   ALBUMIN g/dL 3.7   GLOBULIN gm/dL 2.9       No results for input(s): \"MSPIKE\", \"KAPPALAMB\", \"IGLFLC\", \"URICACID\", \"FREEKAPPAL\", \"CEA\", \"LDH\", \"REFLABREPO\" in the last [id number] hours.    Lab Results - Last 18 Months   Lab Units 11/15/23  1329   IRON mcg/dL 16*   TIBC mcg/dL 475   IRON SATURATION (TSAT) % 3*   FERRITIN ng/mL 10.09*   FOLATE ng/mL 4.90         PAST MEDICAL HISTORY:  ALLERGIES:  Allergies   Allergen Reactions    Bee Venom Swelling    Erythromycin Shortness Of Breath    Penicillins Hives    Ampicillin Hives     CURRENT MEDICATIONS:  Outpatient Encounter Medications as of 11/15/2023   Medication Sig Dispense Refill    albuterol (PROVENTIL) (2.5 MG/3ML) 0.083% nebulizer solution Take 2.5 mg by nebulization Every 4 (Four) Hours As Needed for Wheezing.      albuterol sulfate  (90 Base) MCG/ACT inhaler Inhale 2 puffs Every 4 (Four) Hours As Needed for Wheezing.      Atogepant (Qulipta) 60 MG tablet Take 1 tablet by mouth Daily. 30 tablet 5    butalbital-acetaminophen-caffeine (FIORICET, ESGIC) -40 MG per tablet Take 1 tablet by mouth Daily As Needed for Headache.      DULoxetine (CYMBALTA) 60 MG capsule Take 90 mg by mouth Every Night. Pt takes a 30 mg and a 60 mg for a total of 90 mg      escitalopram (LEXAPRO) 10 MG tablet Take 1 tablet by mouth Daily.      gabapentin (NEURONTIN) 300 MG capsule Take 1 capsule by mouth 3 (Three) Times a Day.      meclizine (ANTIVERT) 25 MG tablet Take 1 tablet by mouth 3 (Three) Times a Day As Needed for Dizziness.      Needle, Disp, 25G X 1\" misc 10 each Daily. 2 each 0    oxyCODONE-acetaminophen (PERCOCET) 7.5-325 MG per tablet Take 1 tablet by mouth 3 (Three) Times a Day As Needed.      pantoprazole (PROTONIX) 40 MG EC tablet Take 1 tablet by mouth 2 (Two) Times a Day.      promethazine (PHENERGAN) 25 MG tablet Take 0.5 tablets by " mouth Every 6 (Six) Hours As Needed for Nausea or Vomiting.      Rimegepant Sulfate (Nurtec) 75 MG tablet dispersible tablet Take 1 tablet by mouth 1 (One) Time As Needed (migraine). 16 tablet 5    rivaroxaban (XARELTO) 20 MG tablet Take 1 tablet by mouth Daily.      rOPINIRole (REQUIP) 1 MG tablet Take 1 tablet by mouth 2 (Two) Times a Day. Take 1 hour before bedtime.      [DISCONTINUED] Rivaroxaban (XARELTO) tablet therapy pack starter pack Take one 15 mg tablet twice daily with food for 21 days.  Followed by one 20 mg tablet by mouth once daily with food. Take as directed (Patient not taking: Take one 15 mg tablet twice daily with food for 21 days.  Followed by one 20 mg tablet by mouth once daily with food. Reported on 11/15/2023)       No facility-administered encounter medications on file as of 11/15/2023.     ADULT ILLNESSES:  Patient Active Problem List   Diagnosis Code    Pulmonary embolism, bilateral I26.99    Microcytic anemia D50.9    Tobacco abuse Z72.0    Chronic back pain M54.9, G89.29    Obesity, Class III, BMI 40-49.9 (morbid obesity) E66.01    Acute deep vein thrombosis (DVT) of left lower extremity I82.402    Iron deficiency anemia D50.9    B12 deficiency E53.8     SURGERIES:  Past Surgical History:   Procedure Laterality Date    BREAST SURGERY      reduction and lift    CHOLECYSTECTOMY      DILATATION AND CURETTAGE      GASTRIC SLEEVE LAPAROSCOPIC  2016    INSERT / REPLACE / REMOVE PACEMAKER      at 23 years old due to syncope    PACEMAKER IMPLANTATION      REDUCTION MAMMAPLASTY       HEALTH MAINTENANCE ITEMS:  Health Maintenance Due   Topic Date Due    BMI FOLLOWUP  Never done    Pneumococcal Vaccine 0-64 (1 - PCV) Never done    ZOSTER VACCINE (1 of 2) Never done    HEPATITIS C SCREENING  Never done    ANNUAL WELLNESS VISIT  Never done    PAP SMEAR  Never done    LUNG CANCER SCREENING  12/20/2022    COLORECTAL CANCER SCREENING  12/20/2022    INFLUENZA VACCINE  Never done    COVID-19 Vaccine (2 -  2023-24 season) 09/01/2023       <no information>  Last Completed Colonoscopy       This patient has no relevant Health Maintenance data.          Immunization History   Administered Date(s) Administered    COVID-19 ([facility]I) 04/07/2021     Last Completed Mammogram            MAMMOGRAM (Every 2 Years) Next due on 2/14/2024 02/14/2022  Mammo Screening Digital Tomosynthesis Bilateral With CAD                      FAMILY HISTORY:  Family History   Problem Relation Age of Onset    Stroke Mother     Kidney failure Mother     Diabetes Mother     Miscarriages / Stillbirths Mother         x2    Cancer Father 60        brain    Diabetes Sister     Hypertension Sister     Kidney disease Brother     Depression Brother     Miscarriages / Stillbirths Maternal Grandmother         x3    Heart attack Sister     Transient ischemic attack Sister         non-hemorrhagoic    Multiple sclerosis Sister     Miscarriages / Stillbirths Sister         x1    Diabetes Sister     Obesity Sister     Diabetes Sister     Stroke Sister     Diabetes Brother     Obesity Brother     Gout Brother     Breast cancer Maternal Aunt      SOCIAL HISTORY:  Social History     Socioeconomic History    Marital status:    Tobacco Use    Smoking status: Every Day     Packs/day: 1.00     Years: 30.00     Additional pack years: 0.00     Total pack years: 30.00     Types: Cigarettes     Passive exposure: Never    Smokeless tobacco: Never   Vaping Use    Vaping Use: Never used   Substance and Sexual Activity    Alcohol use: No    Drug use: No    Sexual activity: Defer       REVIEW OF SYSTEMS:  Review of Systems   Constitutional:  Positive for fatigue. Negative for fever.   HENT:  Negative for trouble swallowing.    Respiratory:  Positive for shortness of breath. Negative for cough.    Cardiovascular:  Negative for chest pain, palpitations and leg swelling.   Gastrointestinal:  Negative for nausea and vomiting.   Genitourinary:  Negative for hematuria.  "  Musculoskeletal:  Negative for arthralgias and myalgias.   Skin:  Negative for rash, skin lesions and wound.   Neurological:  Negative for dizziness, syncope, memory problem and confusion.   Psychiatric/Behavioral:  Negative for suicidal ideas and depressed mood. The patient is not nervous/anxious.        /76   Pulse 89   Temp 96.7 °F (35.9 °C)   Resp 16   Ht 160 cm (63\")   Wt 110 kg (243 lb)   SpO2 97%   BMI 43.05 kg/m²  Body surface area is 2.1 meters squared.    Pain Score    11/15/23 1336   PainSc:   8   PainLoc: Back        Physical Exam  Constitutional:       Appearance: Normal appearance.   HENT:      Head: Normocephalic and atraumatic.   Cardiovascular:      Rate and Rhythm: Normal rate and regular rhythm.   Pulmonary:      Effort: Pulmonary effort is normal.      Breath sounds: Normal breath sounds.   Abdominal:      General: Bowel sounds are normal.      Palpations: Abdomen is soft.   Musculoskeletal:      Right lower leg: No edema.      Left lower leg: No edema.   Skin:     General: Skin is warm and dry.   Neurological:      Mental Status: She is alert and oriented to person, place, and time.   Psychiatric:         Attention and Perception: Attention normal.         Mood and Affect: Mood normal.         Judgment: Judgment normal.         Yecenia Tabares reports a pain score of 8.  Given her pain assessment as noted, treatment options were discussed and the following options were decided upon as a follow-up plan to address the patient's pain: continuation of current treatment plan for pain and pt takes gabapentin, percocet and Fioricet .           ASSESSMENT / PLAN    1. Iron deficiency anemia, unspecified iron deficiency anemia type    2. Low serum vitamin B12    3. History of DVT (deep vein thrombosis)    4. Anticoagulant long-term use         ASSESSMENT:     Iron Deficiency   -Labs today:  Hgb 8.7, Hct 31.4, Iron 16, Ferritin 10, Sat 3%, TIBC 475  -Will order Venofer 200 mg IV weekly x " 4   -Will check CBC with each infusion   -Transfuse for Hgb < 7g    Low B12   -B12 today 343  -Will order monthly B12 injections     History of DVT  Long term Anticoagulation   -Pt is taking Xarelto 20 mg PO daily   -Pt denies s/s of bleeding or toxicity     PLAN:  Pt will receive Venofer 200 mg IV weekly x 4   Return to office in 6 weeks.  She will have labs drawn externally prior to office visit  Patient will have preoffice labs.     Continue current medications, treatment plans and follow up with PCP and any other providers.  Care discussed with patient.  Understanding expressed.  Patient agreeable with plan.        ACP discussion was held with the patient during this visit. Patient has an advance directive in EMR which is still valid.     I spent 40 minutes caring for Yecenia on this date of service. This time includes time spent by me in the following activities: preparing for the visit, reviewing tests, performing a medically appropriate examination and/or evaluation, counseling and educating the patient/family/caregiver, ordering medications, tests, or procedures, documenting information in the medical record, and care coordination.        Linh Lau, APRN  11/15/2023

## 2023-11-15 NOTE — LETTER
November 27, 2023       No Recipients    Patient: Yecenia Tabares   YOB: 1970   Date of Visit: 11/15/2023     Dear Sushil Nash MD:       Thank you for referring Yecenia Tabares to me for evaluation. Below are the relevant portions of my assessment and plan of care.    If you have questions, please do not hesitate to call me. I look forward to following Yecenia along with you.         Sincerely,        HELADIO Mcmillan        CC:   No Recipients    Linh Lau APRN  11/27/23 1216  Sign when Signing Visit  [facility] HEMATOLOGY & ONCOLOGY  [address]  [place] [address]  [phone]    Patient Name: Yecenia Tabares  Encounter Date: 11/15/2023   YOB: 1970  Patient Number: [id number]    Hematology / Oncology Progress Note      HPI / REASON FOR VISIT: Yecenia Tabares is a 53 y.o. female initially seen by Dr. Espana for iron deficiency, pulmonary embolism and left leg DVT.  The patient presented to the hospital on 12/20/2021 with shortness of breath and was found to have an elevated D-dimer along with low iron studies.  A CT chest angiogram on 12/20/2021 showed diffuse bilateral pulmonary emboli involving the distal main pulmonary arteries extending into the subsegmental branches.  Mild right ventricular heart strain with RV to LV ratio measuring 1.41.  Scattered groundglass opacities may represent multifocal pneumonia and/or ischemic changes of the parenchyma.  The patient was started on a heparin drip.     Of note, the patient had J&J vaccine in April 2021 and reported difficulty since that time with a right facial droop and difficulty with her speech since May or June of this year (!!!)  She and her sister state that she has been sick since then on and off without stop and symptoms started 2 weeks after the vaccine. Her sister also states that she had a seizure at that time. She does have a history  "of syncope when she was younger. PPM was checked recently and as per patient and sister was told that it was working fine     Lower extremity Dopplers done on 21 showed evidence of a DVT in the common femoral, profunda femoris, superficial femoral, popliteal, peroneal and posterior tibial veins in the left lower extremity.  There was no evidence of a DVT in the right side.  The patient also was noted to have superficial thrombus in the saphenous junction in the left lower extremity.  She stated that around this time, she was mostly in bed due to thinking she had pneumonia.      In addition to all this, the patient was noted to have a hemoglobin of 6.8 with an MCV of 72.9.  She was given 1 unit of packed red blood cells and also was given Venofer 200 mg for 2 days (as per patient had 2 iron infusions although not documented in the Epic chart). Patient has a history of gastric sleeve procedure and may not absorb oral iron and will likely need IV Venofer transfusions as an outpatient     The patient denied any history of malignancy or previous hypercoagulable state but did state that her father had malignancy with brain involvement as well as family member with breast cancer.  The patient is a smoker.     The patient was started on Lovenox and then transitioned to Eliquis and then changed to  Xarelto (due to insurance) on 2021. She states that she takes the medications as directed without missing a dose.      She denies family history of clotting but there are many family members and the patient have had miscarriages (she personally had 2   In the early part of the pregnancies)     She complains of pain in the right lower leg \"burning if I keep it straight but goes away when I straighten the leg\".      She no longer gets menstruation (has not had one for 7-8 years). She denies bleeding anywhere else.  She denies any signs of PICA syndrome.      The inheritable thrombophilia panel includes the followin. "  APC resistance/factor V Leiden WNL  2.  Prothrombin gene mutation  3.  Protein C functional assay shows elevation in protein C antigen at 165 and elevation of protein C functional at 190 --elevations in protein C are usually not clinically significant but may indicate chronic inflammation or inflammatory disorder  4. protein S free protein and antigen assay shows elevation of free antigen at 145 but normal total antigen at 127 and functional protein S at 123 -- elevations in protein C are usually not clinically significant but may indicate chronic inflammation or inflammatory disorder  5. Antithrombin III -elevated at 154 with a normal antigen at 110 -elevations in Antithrombin III do not correspond with clotting disorder but may theoretically correspond to a bleeding disorder which this patient does not appear to have  6.  PT/PTT/INR  7.  Anticardiolipin antibody WNL  8.  Beta-2 glycoprotein antibodies WNL  9.  Lupus anticoagulant POSITIVE -- if positive again in 12 weeks, would qualify patient for APLA Syndrome and would recommend change of anticoagulants to coumadin.  However, please note, patient is taking DOAC which can interfere and give a false positive lupus anticoagulant. Please note:  LAC can be FALSELY ELEVATED in patients on DOAC.  In an attempt to make sure that the LAC is not a false positive, in those patients taking a DOAC, can take off for 48 hours and recheck the LAC     INTERVAL HISTORY     Pt presents to clinic today with her sister for continued follow up.  Her  last visit to this office was 11/15/23.  She continues to take Xarelto 20 mg daily w/o any s/s of bleeding or toxicity.   She complains of worsening fatigue.     She had labs drawn and results were reviewed with her in office.        LABS    Lab Results - Last 18 Months   Lab Units 11/22/23  0953 11/15/23  1329   HEMOGLOBIN g/dL 9.3* 8.7*   HEMATOCRIT % 34.9 31.4*   MCV fL 72.9* 72.2*   WBC 10*3/mm3 6.61 7.09   RDW % 21.0* 21.1*  "  PLATELETS 10*3/mm3 305 339   NEUTROS ABS 10*3/mm3 3.83 2.98   EOS ABS 10*3/mm3 0.53* 0.78*   BASOS ABS 10*3/mm3 0.07 0.07   NRBC /100 WBC  --  1.0*   NEUTROPHIL % % 57.0 41.0*   MONOCYTES % % 4.0* 6.0   BASOPHIL % % 1.0 1.0   ATYP LYMPH % % 4.0 2.0   ANISOCYTOSIS  Slight/1+ Slight/1+       Lab Results - Last 18 Months   Lab Units 11/15/23  1329   GLUCOSE mg/dL 104*   SODIUM mmol/L 142   POTASSIUM mmol/L 4.0   CO2 mmol/L 24.0   CHLORIDE mmol/L 108*   ANION GAP mmol/L 10.0   CREATININE mg/dL 0.69   BUN mg/dL 14   BUN / CREAT RATIO  20.3   CALCIUM mg/dL 8.7   ALK PHOS U/L 73   TOTAL PROTEIN g/dL 6.6   ALT (SGPT) U/L 10   AST (SGOT) U/L 11   BILIRUBIN mg/dL 0.2   ALBUMIN g/dL 3.7   GLOBULIN gm/dL 2.9       No results for input(s): \"MSPIKE\", \"KAPPALAMB\", \"IGLFLC\", \"URICACID\", \"FREEKAPPAL\", \"CEA\", \"LDH\", \"REFLABREPO\" in the last 46650 hours.    Lab Results - Last 18 Months   Lab Units 11/15/23  1329   IRON mcg/dL 16*   TIBC mcg/dL 475   IRON SATURATION (TSAT) % 3*   FERRITIN ng/mL 10.09*   FOLATE ng/mL 4.90         PAST MEDICAL HISTORY:  ALLERGIES:  Allergies   Allergen Reactions   • Bee Venom Swelling   • Erythromycin Shortness Of Breath   • Penicillins Hives   • Ampicillin Hives     CURRENT MEDICATIONS:  Outpatient Encounter Medications as of 11/15/2023   Medication Sig Dispense Refill   • albuterol (PROVENTIL) (2.5 MG/3ML) 0.083% nebulizer solution Take 2.5 mg by nebulization Every 4 (Four) Hours As Needed for Wheezing.     • albuterol sulfate  (90 Base) MCG/ACT inhaler Inhale 2 puffs Every 4 (Four) Hours As Needed for Wheezing.     • Atogepant (Qulipta) 60 MG tablet Take 1 tablet by mouth Daily. 30 tablet 5   • butalbital-acetaminophen-caffeine (FIORICET, ESGIC) -40 MG per tablet Take 1 tablet by mouth Daily As Needed for Headache.     • DULoxetine (CYMBALTA) 60 MG capsule Take 90 mg by mouth Every Night. Pt takes a 30 mg and a 60 mg for a total of 90 mg     • escitalopram (LEXAPRO) 10 MG tablet Take 1 " "tablet by mouth Daily.     • gabapentin (NEURONTIN) 300 MG capsule Take 1 capsule by mouth 3 (Three) Times a Day.     • meclizine (ANTIVERT) 25 MG tablet Take 1 tablet by mouth 3 (Three) Times a Day As Needed for Dizziness.     • Needle, Disp, 25G X 1\" misc 10 each Daily. 2 each 0   • oxyCODONE-acetaminophen (PERCOCET) 7.5-325 MG per tablet Take 1 tablet by mouth 3 (Three) Times a Day As Needed.     • pantoprazole (PROTONIX) 40 MG EC tablet Take 1 tablet by mouth 2 (Two) Times a Day.     • promethazine (PHENERGAN) 25 MG tablet Take 0.5 tablets by mouth Every 6 (Six) Hours As Needed for Nausea or Vomiting.     • Rimegepant Sulfate (Nurtec) 75 MG tablet dispersible tablet Take 1 tablet by mouth 1 (One) Time As Needed (migraine). 16 tablet 5   • rivaroxaban (XARELTO) 20 MG tablet Take 1 tablet by mouth Daily.     • rOPINIRole (REQUIP) 1 MG tablet Take 1 tablet by mouth 2 (Two) Times a Day. Take 1 hour before bedtime.     • [DISCONTINUED] Rivaroxaban (XARELTO) tablet therapy pack starter pack Take one 15 mg tablet twice daily with food for 21 days.  Followed by one 20 mg tablet by mouth once daily with food. Take as directed (Patient not taking: Take one 15 mg tablet twice daily with food for 21 days.  Followed by one 20 mg tablet by mouth once daily with food. Reported on 11/15/2023)       No facility-administered encounter medications on file as of 11/15/2023.     ADULT ILLNESSES:  Patient Active Problem List   Diagnosis Code   • Pulmonary embolism, bilateral I26.99   • Microcytic anemia D50.9   • Tobacco abuse Z72.0   • Chronic back pain M54.9, G89.29   • Obesity, Class III, BMI 40-49.9 (morbid obesity) E66.01   • Acute deep vein thrombosis (DVT) of left lower extremity I82.402   • Iron deficiency anemia D50.9   • B12 deficiency E53.8     SURGERIES:  Past Surgical History:   Procedure Laterality Date   • BREAST SURGERY      reduction and lift   • CHOLECYSTECTOMY     • DILATATION AND CURETTAGE     • GASTRIC SLEEVE " LAPAROSCOPIC  2016   • INSERT / REPLACE / REMOVE PACEMAKER      at 23 years old due to syncope   • PACEMAKER IMPLANTATION     • REDUCTION MAMMAPLASTY       HEALTH MAINTENANCE ITEMS:  Health Maintenance Due   Topic Date Due   • BMI FOLLOWUP  Never done   • Pneumococcal Vaccine 0-64 (1 - PCV) Never done   • ZOSTER VACCINE (1 of 2) Never done   • HEPATITIS C SCREENING  Never done   • ANNUAL WELLNESS VISIT  Never done   • PAP SMEAR  Never done   • LUNG CANCER SCREENING  12/20/2022   • COLORECTAL CANCER SCREENING  12/20/2022   • INFLUENZA VACCINE  Never done   • COVID-19 Vaccine (2 - 2023-24 season) 09/01/2023       <no information>  Last Completed Colonoscopy       This patient has no relevant Health Maintenance data.          Immunization History   Administered Date(s) Administered   • COVID-19 ([employer]) 04/07/2021     Last Completed Mammogram            MAMMOGRAM (Every 2 Years) Next due on 2/14/2024 02/14/2022  Mammo Screening Digital Tomosynthesis Bilateral With CAD                      FAMILY HISTORY:  Family History   Problem Relation Age of Onset   • Stroke Mother    • Kidney failure Mother    • Diabetes Mother    • Miscarriages / Stillbirths Mother         x2   • Cancer Father 60        brain   • Diabetes Sister    • Hypertension Sister    • Kidney disease Brother    • Depression Brother    • Miscarriages / Stillbirths Maternal Grandmother         x3   • Heart attack Sister    • Transient ischemic attack Sister         non-hemorrhagoic   • Multiple sclerosis Sister    • Miscarriages / Stillbirths Sister         x1   • Diabetes Sister    • Obesity Sister    • Diabetes Sister    • Stroke Sister    • Diabetes Brother    • Obesity Brother    • Gout Brother    • Breast cancer Maternal Aunt      SOCIAL HISTORY:  Social History     Socioeconomic History   • Marital status:    Tobacco Use   • Smoking status: Every Day     Packs/day: 1.00     Years: 30.00     Additional pack years: 0.00     Total pack  "years: 30.00     Types: Cigarettes     Passive exposure: Never   • Smokeless tobacco: Never   Vaping Use   • Vaping Use: Never used   Substance and Sexual Activity   • Alcohol use: No   • Drug use: No   • Sexual activity: Defer       REVIEW OF SYSTEMS:  Review of Systems   Constitutional:  Positive for fatigue. Negative for fever.   HENT:  Negative for trouble swallowing.    Respiratory:  Positive for shortness of breath. Negative for cough.    Cardiovascular:  Negative for chest pain, palpitations and leg swelling.   Gastrointestinal:  Negative for nausea and vomiting.   Genitourinary:  Negative for hematuria.   Musculoskeletal:  Negative for arthralgias and myalgias.   Skin:  Negative for rash, skin lesions and wound.   Neurological:  Negative for dizziness, syncope, memory problem and confusion.   Psychiatric/Behavioral:  Negative for suicidal ideas and depressed mood. The patient is not nervous/anxious.        /76   Pulse 89   Temp 96.7 °F (35.9 °C)   Resp 16   Ht 160 cm (63\")   Wt 110 kg (243 lb)   SpO2 97%   BMI 43.05 kg/m²  Body surface area is 2.1 meters squared.    Pain Score    11/15/23 1336   PainSc:   8   PainLoc: Back        Physical Exam  Constitutional:       Appearance: Normal appearance.   HENT:      Head: Normocephalic and atraumatic.   Cardiovascular:      Rate and Rhythm: Normal rate and regular rhythm.   Pulmonary:      Effort: Pulmonary effort is normal.      Breath sounds: Normal breath sounds.   Abdominal:      General: Bowel sounds are normal.      Palpations: Abdomen is soft.   Musculoskeletal:      Right lower leg: No edema.      Left lower leg: No edema.   Skin:     General: Skin is warm and dry.   Neurological:      Mental Status: She is alert and oriented to person, place, and time.   Psychiatric:         Attention and Perception: Attention normal.         Mood and Affect: Mood normal.         Judgment: Judgment normal.         Yecenia Lawrence Rayshawn reports a pain score of 8.  " Given her pain assessment as noted, treatment options were discussed and the following options were decided upon as a follow-up plan to address the patient's pain: continuation of current treatment plan for pain and pt takes gabapentin, percocet and Fioricet .           ASSESSMENT / PLAN    1. Iron deficiency anemia, unspecified iron deficiency anemia type    2. Low serum vitamin B12    3. History of DVT (deep vein thrombosis)    4. Anticoagulant long-term use         ASSESSMENT:     Iron Deficiency   -Labs today:  Hgb 8.7, Hct 31.4, Iron 16, Ferritin 10, Sat 3%, TIBC 475  -Will order Venofer 200 mg IV weekly x 4   -Will check CBC with each infusion   -Transfuse for Hgb < 7g    Low B12   -B12 today 343  -Will order monthly B12 injections     History of DVT  Long term Anticoagulation   -Pt is taking Xarelto 20 mg PO daily   -Pt denies s/s of bleeding or toxicity     PLAN:  Pt will receive Venofer 200 mg IV weekly x 4   Return to office in 6 weeks.  She will have labs drawn externally prior to office visit  Patient will have preoffice labs.     Continue current medications, treatment plans and follow up with PCP and any other providers.  Care discussed with patient.  Understanding expressed.  Patient agreeable with plan.        ACP discussion was held with the patient during this visit. Patient has an advance directive in EMR which is still valid.     I spent 40 minutes caring for Yecenia on this date of service. This time includes time spent by me in the following activities: preparing for the visit, reviewing tests, performing a medically appropriate examination and/or evaluation, counseling and educating the patient/family/caregiver, ordering medications, tests, or procedures, documenting information in the medical record, and care coordination.        Linh Lau, APRN  11/15/2023

## 2023-11-15 NOTE — TELEPHONE ENCOUNTER
Caller: Yecenia Tabares    Relationship: Self    Best call back number: [phone]    What form or medical record are you requesting: PRIOR AUTH FOR QULIPTA    Who is requesting this form or medical record from you: PHARMACY    How would you like to receive the form or medical records (pick-up, mail, fax): FAX  If fax, what is the fax number: [phone]    Timeframe paperwork needed: ASAP    Additional notes: PHARMACY INFORMED PATIENT TO CALL US TO REQUEST PA FOR THIS SCRIPT.

## 2023-11-16 LAB
FOLATE SERPL-MCNC: 4.9 NG/ML (ref 4.78–24.2)
VIT B12 BLD-MCNC: 343 PG/ML (ref 211–946)

## 2023-11-16 NOTE — TELEPHONE ENCOUNTER
Explained that Qulipta was denied, the [place] has filed an appeal, it may take 30 days for a determination.

## 2023-11-21 ENCOUNTER — TELEPHONE (OUTPATIENT)
Dept: ONCOLOGY | Facility: CLINIC | Age: 53
End: 2023-11-21
Payer: MEDICARE

## 2023-11-21 ENCOUNTER — TELEPHONE (OUTPATIENT)
Dept: NEUROLOGY | Facility: CLINIC | Age: 53
End: 2023-11-21
Payer: MEDICARE

## 2023-11-21 DIAGNOSIS — D50.9 IRON DEFICIENCY ANEMIA, UNSPECIFIED IRON DEFICIENCY ANEMIA TYPE: Primary | ICD-10-CM

## 2023-11-21 RX ORDER — DIPHENHYDRAMINE HCL 25 MG
25 CAPSULE ORAL ONCE
Status: CANCELLED | OUTPATIENT
Start: 2023-11-22

## 2023-11-21 RX ORDER — DIPHENHYDRAMINE HCL 25 MG
25 CAPSULE ORAL ONCE
OUTPATIENT
Start: 2023-12-11

## 2023-11-21 RX ORDER — SODIUM CHLORIDE 9 MG/ML
20 INJECTION, SOLUTION INTRAVENOUS ONCE
OUTPATIENT
Start: 2023-12-18

## 2023-11-21 RX ORDER — SODIUM CHLORIDE 9 MG/ML
20 INJECTION, SOLUTION INTRAVENOUS ONCE
OUTPATIENT
Start: 2023-12-11

## 2023-11-21 RX ORDER — FAMOTIDINE 10 MG/ML
20 INJECTION, SOLUTION INTRAVENOUS AS NEEDED
OUTPATIENT
Start: 2023-12-04

## 2023-11-21 RX ORDER — DIPHENHYDRAMINE HYDROCHLORIDE 50 MG/ML
50 INJECTION INTRAMUSCULAR; INTRAVENOUS AS NEEDED
OUTPATIENT
Start: 2023-12-11

## 2023-11-21 RX ORDER — FAMOTIDINE 10 MG/ML
20 INJECTION, SOLUTION INTRAVENOUS AS NEEDED
OUTPATIENT
Start: 2023-12-11

## 2023-11-21 RX ORDER — DIPHENHYDRAMINE HYDROCHLORIDE 50 MG/ML
50 INJECTION INTRAMUSCULAR; INTRAVENOUS AS NEEDED
OUTPATIENT
Start: 2023-12-04

## 2023-11-21 RX ORDER — SODIUM CHLORIDE 9 MG/ML
20 INJECTION, SOLUTION INTRAVENOUS ONCE
OUTPATIENT
Start: 2023-12-04

## 2023-11-21 RX ORDER — FAMOTIDINE 10 MG/ML
20 INJECTION, SOLUTION INTRAVENOUS AS NEEDED
OUTPATIENT
Start: 2023-12-18

## 2023-11-21 RX ORDER — SODIUM CHLORIDE 9 MG/ML
20 INJECTION, SOLUTION INTRAVENOUS ONCE
Status: CANCELLED | OUTPATIENT
Start: 2023-11-22

## 2023-11-21 RX ORDER — DIPHENHYDRAMINE HCL 25 MG
25 CAPSULE ORAL ONCE
OUTPATIENT
Start: 2023-12-04

## 2023-11-21 RX ORDER — FAMOTIDINE 10 MG/ML
20 INJECTION, SOLUTION INTRAVENOUS AS NEEDED
Status: CANCELLED | OUTPATIENT
Start: 2023-11-22

## 2023-11-21 RX ORDER — DIPHENHYDRAMINE HYDROCHLORIDE 50 MG/ML
50 INJECTION INTRAMUSCULAR; INTRAVENOUS AS NEEDED
Status: CANCELLED | OUTPATIENT
Start: 2023-11-22

## 2023-11-21 RX ORDER — ACETAMINOPHEN 325 MG/1
650 TABLET ORAL ONCE
OUTPATIENT
Start: 2023-12-18

## 2023-11-21 RX ORDER — ACETAMINOPHEN 325 MG/1
650 TABLET ORAL ONCE
OUTPATIENT
Start: 2023-12-11

## 2023-11-21 RX ORDER — DIPHENHYDRAMINE HCL 25 MG
25 CAPSULE ORAL ONCE
OUTPATIENT
Start: 2023-12-18

## 2023-11-21 RX ORDER — ACETAMINOPHEN 325 MG/1
650 TABLET ORAL ONCE
OUTPATIENT
Start: 2023-12-04

## 2023-11-21 RX ORDER — ACETAMINOPHEN 325 MG/1
650 TABLET ORAL ONCE
Status: CANCELLED | OUTPATIENT
Start: 2023-11-22

## 2023-11-21 RX ORDER — DIPHENHYDRAMINE HYDROCHLORIDE 50 MG/ML
50 INJECTION INTRAMUSCULAR; INTRAVENOUS AS NEEDED
OUTPATIENT
Start: 2023-12-18

## 2023-11-21 NOTE — TELEPHONE ENCOUNTER
Yecenia said she has been dizzy and lightheaded in the past, but not like this.  She was at the [employer] and had an episode that lasted 5 to 10 minutes, she had to sit down and it went away.  Her eyes blur over, she can't see, and becomes dizzy.  The last 3 days when she tries to sit up in bed, she becomes dizzy and has to wait for her vision to return before she can get up.  She has been using Nurtec PRN and doesn't know if it is causing the symptoms.  She would like to know what you recommend.

## 2023-11-21 NOTE — TELEPHONE ENCOUNTER
Received call from patient Yecenia Tabares, she calls to report that has been experiencing episodes of near syncope, blurred vision, and dizziness, with excessive sweating and nausea over the past 3-4 days.   Patient reports that she did start a new medication: Nurtec on 11/14/23 and has contacted her PCP Dr Khushbu Trujillo office with message left, but was told that she should also contact Hematology Linh Lau NP Provider also since she is also following her care.    11/15/23: Iron Profile:   Iron Sat: 3  Ferritin: 10.9  HGB: 8.7    Patient requesting call back to discuss her issues and concerns.   [phone]

## 2023-11-21 NOTE — TELEPHONE ENCOUNTER
Provider: DR DIAL    Caller: PATIENT    Relationship to Patient: SELF    Phone Number: [phone]    Reason for Call: PATIENT STATES SHE HAS BEEN HAVING DIZZINESS/LIGHTHEADEDNESS FOR THE PAST COUPLE DAYS. SHE HAS HAD 3 EPISODES SO FAR. SHE STATES HER VISION BLURS BEFORE AND SHE ALSO GETS NAUSEOUS DURING THE SPELLS. IT RESOLVES AFTER SHE RESTS FOR A LITTLE. PATIENT WOULD LIKE TO KNOW IF PROVIDER HAS ANY IDEAS OR RECOMMENDATIONS FOR HER. PLEASE ADVISE, THANK YOU.    Detail Level: Generalized Quality 130: Documentation Of Current Medications In The Medical Record: Current Medications Documented Quality 226: Preventive Care And Screening: Tobacco Use: Screening And Cessation Intervention: Patient screened for tobacco and is an ex-smoker Quality 431: Preventive Care And Screening: Unhealthy Alcohol Use - Screening: Patient screened for unhealthy alcohol use using a single question and scores less than 2 times per year Known

## 2023-11-22 ENCOUNTER — LAB (OUTPATIENT)
Dept: LAB | Facility: HOSPITAL | Age: 53
End: 2023-11-22
Payer: MEDICARE

## 2023-11-22 ENCOUNTER — INFUSION (OUTPATIENT)
Dept: ONCOLOGY | Facility: HOSPITAL | Age: 53
End: 2023-11-22
Payer: MEDICARE

## 2023-11-22 VITALS
HEART RATE: 92 BPM | HEIGHT: 62 IN | SYSTOLIC BLOOD PRESSURE: 117 MMHG | RESPIRATION RATE: 18 BRPM | TEMPERATURE: 96.8 F | DIASTOLIC BLOOD PRESSURE: 78 MMHG | WEIGHT: 230 LBS | BODY MASS INDEX: 42.33 KG/M2 | OXYGEN SATURATION: 100 %

## 2023-11-22 DIAGNOSIS — D50.9 IRON DEFICIENCY ANEMIA, UNSPECIFIED IRON DEFICIENCY ANEMIA TYPE: Primary | ICD-10-CM

## 2023-11-22 DIAGNOSIS — D50.9 IRON DEFICIENCY ANEMIA, UNSPECIFIED IRON DEFICIENCY ANEMIA TYPE: ICD-10-CM

## 2023-11-22 LAB
ANISOCYTOSIS BLD QL: ABNORMAL
BASOPHILS # BLD MANUAL: 0.07 10*3/MM3 (ref 0–0.2)
BASOPHILS NFR BLD MANUAL: 1 % (ref 0–1.5)
DEPRECATED RDW RBC AUTO: 53.5 FL (ref 37–54)
ELLIPTOCYTES BLD QL SMEAR: ABNORMAL
EOSINOPHIL # BLD MANUAL: 0.53 10*3/MM3 (ref 0–0.4)
EOSINOPHIL NFR BLD MANUAL: 8 % (ref 0.3–6.2)
ERYTHROCYTE [DISTWIDTH] IN BLOOD BY AUTOMATED COUNT: 21 % (ref 12.3–15.4)
HCT VFR BLD AUTO: 34.9 % (ref 34–46.6)
HGB BLD-MCNC: 9.3 G/DL (ref 12–15.9)
HOLD SPECIMEN: NORMAL
LYMPHOCYTES # BLD MANUAL: 1.92 10*3/MM3 (ref 0.7–3.1)
LYMPHOCYTES NFR BLD MANUAL: 4 % (ref 5–12)
MCH RBC QN AUTO: 19.4 PG (ref 26.6–33)
MCHC RBC AUTO-ENTMCNC: 26.6 G/DL (ref 31.5–35.7)
MCV RBC AUTO: 72.9 FL (ref 79–97)
MICROCYTES BLD QL: ABNORMAL
MONOCYTES # BLD: 0.26 10*3/MM3 (ref 0.1–0.9)
NEUTROPHILS # BLD AUTO: 3.83 10*3/MM3 (ref 1.7–7)
NEUTROPHILS NFR BLD MANUAL: 57 % (ref 42.7–76)
NEUTS BAND NFR BLD MANUAL: 1 % (ref 0–5)
OVALOCYTES BLD QL SMEAR: ABNORMAL
PLAT MORPH BLD: NORMAL
PLATELET # BLD AUTO: 305 10*3/MM3 (ref 140–450)
PMV BLD AUTO: ABNORMAL FL
POIKILOCYTOSIS BLD QL SMEAR: ABNORMAL
POLYCHROMASIA BLD QL SMEAR: ABNORMAL
RBC # BLD AUTO: 4.79 10*6/MM3 (ref 3.77–5.28)
TARGETS BLD QL SMEAR: ABNORMAL
VARIANT LYMPHS NFR BLD MANUAL: 25 % (ref 19.6–45.3)
VARIANT LYMPHS NFR BLD MANUAL: 4 % (ref 0–5)
WBC MORPH BLD: NORMAL
WBC NRBC COR # BLD AUTO: 6.61 10*3/MM3 (ref 3.4–10.8)

## 2023-11-22 PROCEDURE — 25010000002 IRON SUCROSE PER 1 MG: Performed by: NURSE PRACTITIONER

## 2023-11-22 PROCEDURE — 96374 THER/PROPH/DIAG INJ IV PUSH: CPT

## 2023-11-22 PROCEDURE — 63710000001 ACETAMINOPHEN 325 MG TABLET: Performed by: NURSE PRACTITIONER

## 2023-11-22 PROCEDURE — 63710000001 DIPHENHYDRAMINE PER 50 MG: Performed by: NURSE PRACTITIONER

## 2023-11-22 PROCEDURE — 85025 COMPLETE CBC W/AUTO DIFF WBC: CPT

## 2023-11-22 PROCEDURE — 36415 COLL VENOUS BLD VENIPUNCTURE: CPT

## 2023-11-22 PROCEDURE — A9270 NON-COVERED ITEM OR SERVICE: HCPCS | Performed by: NURSE PRACTITIONER

## 2023-11-22 PROCEDURE — 85007 BL SMEAR W/DIFF WBC COUNT: CPT

## 2023-11-22 PROCEDURE — 96365 THER/PROPH/DIAG IV INF INIT: CPT

## 2023-11-22 PROCEDURE — 25810000003 SODIUM CHLORIDE 0.9 % SOLUTION: Performed by: NURSE PRACTITIONER

## 2023-11-22 RX ORDER — DIPHENHYDRAMINE HCL 25 MG
25 CAPSULE ORAL ONCE
Status: COMPLETED | OUTPATIENT
Start: 2023-11-22 | End: 2023-11-22

## 2023-11-22 RX ORDER — FAMOTIDINE 10 MG/ML
20 INJECTION, SOLUTION INTRAVENOUS AS NEEDED
Status: DISCONTINUED | OUTPATIENT
Start: 2023-11-22 | End: 2023-11-22 | Stop reason: HOSPADM

## 2023-11-22 RX ORDER — DIPHENHYDRAMINE HYDROCHLORIDE 50 MG/ML
50 INJECTION INTRAMUSCULAR; INTRAVENOUS AS NEEDED
Status: DISCONTINUED | OUTPATIENT
Start: 2023-11-22 | End: 2023-11-22 | Stop reason: HOSPADM

## 2023-11-22 RX ORDER — SODIUM CHLORIDE 9 MG/ML
20 INJECTION, SOLUTION INTRAVENOUS ONCE
Status: COMPLETED | OUTPATIENT
Start: 2023-11-22 | End: 2023-11-22

## 2023-11-22 RX ORDER — ACETAMINOPHEN 325 MG/1
650 TABLET ORAL ONCE
Status: COMPLETED | OUTPATIENT
Start: 2023-11-22 | End: 2023-11-22

## 2023-11-22 RX ADMIN — DIPHENHYDRAMINE HYDROCHLORIDE 25 MG: 25 CAPSULE ORAL at 10:53

## 2023-11-22 RX ADMIN — ACETAMINOPHEN 650 MG: 325 TABLET, FILM COATED ORAL at 10:53

## 2023-11-22 RX ADMIN — IRON SUCROSE 200 MG: 20 INJECTION, SOLUTION INTRAVENOUS at 11:04

## 2023-11-22 RX ADMIN — SODIUM CHLORIDE 20 ML/HR: 9 INJECTION, SOLUTION INTRAVENOUS at 10:53

## 2023-11-29 ENCOUNTER — TELEPHONE (OUTPATIENT)
Dept: NEUROLOGY | Facility: HOSPITAL | Age: 53
End: 2023-11-29
Payer: MEDICARE

## 2023-11-30 ENCOUNTER — TELEPHONE (OUTPATIENT)
Dept: NEUROLOGY | Facility: CLINIC | Age: 53
End: 2023-11-30
Payer: MEDICARE

## 2023-11-30 ENCOUNTER — HOSPITAL ENCOUNTER (OUTPATIENT)
Dept: NEUROLOGY | Facility: HOSPITAL | Age: 53
Discharge: HOME | End: 2023-11-30
Admitting: PSYCHIATRY & NEUROLOGY
Payer: MEDICARE

## 2023-11-30 DIAGNOSIS — R68.89 SPELLS OF DECREASED ATTENTIVENESS: ICD-10-CM

## 2023-11-30 PROCEDURE — 95819 EEG AWAKE AND ASLEEP: CPT

## 2023-11-30 NOTE — TELEPHONE ENCOUNTER
Yecenia said she took the last Nurtec today.  She doesn't have anything to take for the weekend and doesn't know what to do.  Qulipta was denied, it has been appealed.  [facility] said she is allowed 16 Nurtec every 27 days and will be able to fill it on 12-5.  She wants to know what you recommend.

## 2023-11-30 NOTE — TELEPHONE ENCOUNTER
PATIENT CALLING TO ADVISE, SHE HAS TAKEN HER LAST NURTEC TODAY.    SHE STATES SHE WAS TAKING QULIPTA DAILY BUT IS NOW TO TAKE NURTEC    SHE IS OUT OF BOTH MEDICATIONS AND WANTS TO KNOW WHAT SHE SHOULD DO    PLEASE ADVISE PATIENT    THANK YOU

## 2023-12-04 ENCOUNTER — INFUSION (OUTPATIENT)
Dept: ONCOLOGY | Facility: HOSPITAL | Age: 53
End: 2023-12-04
Payer: MEDICARE

## 2023-12-04 VITALS
HEART RATE: 81 BPM | TEMPERATURE: 97.2 F | SYSTOLIC BLOOD PRESSURE: 132 MMHG | DIASTOLIC BLOOD PRESSURE: 76 MMHG | OXYGEN SATURATION: 98 % | RESPIRATION RATE: 18 BRPM

## 2023-12-04 DIAGNOSIS — D50.9 IRON DEFICIENCY ANEMIA, UNSPECIFIED IRON DEFICIENCY ANEMIA TYPE: Primary | ICD-10-CM

## 2023-12-04 PROCEDURE — A9270 NON-COVERED ITEM OR SERVICE: HCPCS | Performed by: NURSE PRACTITIONER

## 2023-12-04 PROCEDURE — 25810000003 SODIUM CHLORIDE 0.9 % SOLUTION: Performed by: NURSE PRACTITIONER

## 2023-12-04 PROCEDURE — 96374 THER/PROPH/DIAG INJ IV PUSH: CPT

## 2023-12-04 PROCEDURE — 96365 THER/PROPH/DIAG IV INF INIT: CPT

## 2023-12-04 PROCEDURE — 63710000001 ACETAMINOPHEN 325 MG TABLET: Performed by: NURSE PRACTITIONER

## 2023-12-04 PROCEDURE — 63710000001 DIPHENHYDRAMINE PER 50 MG: Performed by: NURSE PRACTITIONER

## 2023-12-04 PROCEDURE — 25010000002 IRON SUCROSE PER 1 MG: Performed by: NURSE PRACTITIONER

## 2023-12-04 RX ORDER — DIPHENHYDRAMINE HYDROCHLORIDE 50 MG/ML
50 INJECTION INTRAMUSCULAR; INTRAVENOUS AS NEEDED
Status: DISCONTINUED | OUTPATIENT
Start: 2023-12-04 | End: 2023-12-04 | Stop reason: HOSPADM

## 2023-12-04 RX ORDER — DIPHENHYDRAMINE HCL 25 MG
25 CAPSULE ORAL ONCE
Status: COMPLETED | OUTPATIENT
Start: 2023-12-04 | End: 2023-12-04

## 2023-12-04 RX ORDER — FAMOTIDINE 10 MG/ML
20 INJECTION, SOLUTION INTRAVENOUS AS NEEDED
Status: DISCONTINUED | OUTPATIENT
Start: 2023-12-04 | End: 2023-12-04 | Stop reason: HOSPADM

## 2023-12-04 RX ORDER — SODIUM CHLORIDE 9 MG/ML
20 INJECTION, SOLUTION INTRAVENOUS ONCE
Status: COMPLETED | OUTPATIENT
Start: 2023-12-04 | End: 2023-12-04

## 2023-12-04 RX ORDER — ACETAMINOPHEN 325 MG/1
650 TABLET ORAL ONCE
Status: COMPLETED | OUTPATIENT
Start: 2023-12-04 | End: 2023-12-04

## 2023-12-04 RX ADMIN — ACETAMINOPHEN 650 MG: 325 TABLET, FILM COATED ORAL at 14:48

## 2023-12-04 RX ADMIN — IRON SUCROSE 200 MG: 20 INJECTION, SOLUTION INTRAVENOUS at 14:49

## 2023-12-04 RX ADMIN — SODIUM CHLORIDE 20 ML/HR: 9 INJECTION, SOLUTION INTRAVENOUS at 14:49

## 2023-12-04 RX ADMIN — DIPHENHYDRAMINE HYDROCHLORIDE 25 MG: 25 CAPSULE ORAL at 14:48

## 2023-12-06 ENCOUNTER — TELEPHONE (OUTPATIENT)
Dept: NEUROLOGY | Facility: CLINIC | Age: 53
End: 2023-12-06
Payer: MEDICARE

## 2023-12-06 NOTE — TELEPHONE ENCOUNTER
Caller: Yecenia Tabares    Relationship: Self    Best call back number: [phone]    Which medication are you concerned about:   Atogepant (Qulipta) 60 MG tablet     Who prescribed you this medication:   DR BRYCE RAIN    When did you start taking this medication:   PT TOOK ONE YESTERDAY AND ONE TODAY.    What are your concerns:   PT CALLED TO SAY THIS WAS APPROVED BY HER INSURANCE AND DR RAIN SENT A SCRIPT FOR THE PT.     PT STILL HAS NURTEC. PT ONLY TAKES NURTEC WHEN THE PAIN IS REALLY BAD.    PT WASN'T SURE IF DR RAIN WOULD BE ABLE TO CONTINUE PRESCRIBING THIS FOR RX HER.

## 2023-12-11 ENCOUNTER — INFUSION (OUTPATIENT)
Dept: ONCOLOGY | Facility: HOSPITAL | Age: 53
End: 2023-12-11
Payer: MEDICARE

## 2023-12-11 VITALS
BODY MASS INDEX: 41.6 KG/M2 | TEMPERATURE: 96.4 F | HEIGHT: 63 IN | SYSTOLIC BLOOD PRESSURE: 128 MMHG | OXYGEN SATURATION: 94 % | DIASTOLIC BLOOD PRESSURE: 84 MMHG | RESPIRATION RATE: 18 BRPM | WEIGHT: 234.8 LBS | HEART RATE: 81 BPM

## 2023-12-11 DIAGNOSIS — D50.9 IRON DEFICIENCY ANEMIA, UNSPECIFIED IRON DEFICIENCY ANEMIA TYPE: Primary | ICD-10-CM

## 2023-12-11 PROCEDURE — 96365 THER/PROPH/DIAG IV INF INIT: CPT

## 2023-12-11 PROCEDURE — 63710000001 ACETAMINOPHEN 325 MG TABLET: Performed by: NURSE PRACTITIONER

## 2023-12-11 PROCEDURE — 25810000003 SODIUM CHLORIDE 0.9 % SOLUTION: Performed by: NURSE PRACTITIONER

## 2023-12-11 PROCEDURE — 63710000001 DIPHENHYDRAMINE PER 50 MG: Performed by: NURSE PRACTITIONER

## 2023-12-11 PROCEDURE — A9270 NON-COVERED ITEM OR SERVICE: HCPCS | Performed by: NURSE PRACTITIONER

## 2023-12-11 PROCEDURE — 96374 THER/PROPH/DIAG INJ IV PUSH: CPT

## 2023-12-11 PROCEDURE — 25010000002 IRON SUCROSE PER 1 MG: Performed by: NURSE PRACTITIONER

## 2023-12-11 RX ORDER — DIPHENHYDRAMINE HCL 25 MG
25 CAPSULE ORAL ONCE
Status: COMPLETED | OUTPATIENT
Start: 2023-12-11 | End: 2023-12-11

## 2023-12-11 RX ORDER — SODIUM CHLORIDE 9 MG/ML
20 INJECTION, SOLUTION INTRAVENOUS ONCE
Status: COMPLETED | OUTPATIENT
Start: 2023-12-11 | End: 2023-12-11

## 2023-12-11 RX ORDER — DIPHENHYDRAMINE HYDROCHLORIDE 50 MG/ML
50 INJECTION INTRAMUSCULAR; INTRAVENOUS AS NEEDED
Status: DISCONTINUED | OUTPATIENT
Start: 2023-12-11 | End: 2023-12-11 | Stop reason: HOSPADM

## 2023-12-11 RX ORDER — ACETAMINOPHEN 325 MG/1
650 TABLET ORAL ONCE
Status: COMPLETED | OUTPATIENT
Start: 2023-12-11 | End: 2023-12-11

## 2023-12-11 RX ORDER — FAMOTIDINE 10 MG/ML
20 INJECTION, SOLUTION INTRAVENOUS AS NEEDED
Status: DISCONTINUED | OUTPATIENT
Start: 2023-12-11 | End: 2023-12-11 | Stop reason: HOSPADM

## 2023-12-11 RX ADMIN — IRON SUCROSE 200 MG: 20 INJECTION, SOLUTION INTRAVENOUS at 12:46

## 2023-12-11 RX ADMIN — SODIUM CHLORIDE 20 ML/HR: 9 INJECTION, SOLUTION INTRAVENOUS at 12:30

## 2023-12-11 RX ADMIN — DIPHENHYDRAMINE HYDROCHLORIDE 25 MG: 25 CAPSULE ORAL at 12:37

## 2023-12-11 RX ADMIN — ACETAMINOPHEN 650 MG: 325 TABLET, FILM COATED ORAL at 12:37

## 2023-12-18 ENCOUNTER — INFUSION (OUTPATIENT)
Dept: ONCOLOGY | Facility: HOSPITAL | Age: 53
End: 2023-12-18
Payer: MEDICARE

## 2023-12-18 VITALS
DIASTOLIC BLOOD PRESSURE: 76 MMHG | HEART RATE: 98 BPM | TEMPERATURE: 97.2 F | HEIGHT: 63 IN | SYSTOLIC BLOOD PRESSURE: 123 MMHG | WEIGHT: 228 LBS | RESPIRATION RATE: 18 BRPM | OXYGEN SATURATION: 98 % | BODY MASS INDEX: 40.4 KG/M2

## 2023-12-18 DIAGNOSIS — D50.9 IRON DEFICIENCY ANEMIA, UNSPECIFIED IRON DEFICIENCY ANEMIA TYPE: Primary | ICD-10-CM

## 2023-12-18 PROCEDURE — 25010000002 IRON SUCROSE PER 1 MG: Performed by: NURSE PRACTITIONER

## 2023-12-18 PROCEDURE — 96365 THER/PROPH/DIAG IV INF INIT: CPT

## 2023-12-18 PROCEDURE — 96374 THER/PROPH/DIAG INJ IV PUSH: CPT

## 2023-12-18 PROCEDURE — 63710000001 DIPHENHYDRAMINE PER 50 MG: Performed by: NURSE PRACTITIONER

## 2023-12-18 PROCEDURE — 25810000003 SODIUM CHLORIDE 0.9 % SOLUTION: Performed by: NURSE PRACTITIONER

## 2023-12-18 PROCEDURE — A9270 NON-COVERED ITEM OR SERVICE: HCPCS | Performed by: NURSE PRACTITIONER

## 2023-12-18 PROCEDURE — 63710000001 ACETAMINOPHEN 325 MG TABLET: Performed by: NURSE PRACTITIONER

## 2023-12-18 RX ORDER — ACETAMINOPHEN 325 MG/1
650 TABLET ORAL ONCE
Status: COMPLETED | OUTPATIENT
Start: 2023-12-18 | End: 2023-12-18

## 2023-12-18 RX ORDER — SODIUM CHLORIDE 9 MG/ML
20 INJECTION, SOLUTION INTRAVENOUS ONCE
Status: COMPLETED | OUTPATIENT
Start: 2023-12-18 | End: 2023-12-18

## 2023-12-18 RX ORDER — DIPHENHYDRAMINE HCL 25 MG
25 CAPSULE ORAL ONCE
Status: COMPLETED | OUTPATIENT
Start: 2023-12-18 | End: 2023-12-18

## 2023-12-18 RX ADMIN — SODIUM CHLORIDE 20 ML/HR: 9 INJECTION, SOLUTION INTRAVENOUS at 13:13

## 2023-12-18 RX ADMIN — IRON SUCROSE 200 MG: 20 INJECTION, SOLUTION INTRAVENOUS at 13:15

## 2023-12-18 RX ADMIN — DIPHENHYDRAMINE HYDROCHLORIDE 25 MG: 25 CAPSULE ORAL at 13:13

## 2023-12-18 RX ADMIN — ACETAMINOPHEN 650 MG: 325 TABLET, FILM COATED ORAL at 13:13

## 2023-12-27 ENCOUNTER — LAB (OUTPATIENT)
Dept: LAB | Facility: HOSPITAL | Age: 53
End: 2023-12-27
Payer: MEDICARE

## 2023-12-27 ENCOUNTER — TELEPHONE (OUTPATIENT)
Dept: ONCOLOGY | Facility: CLINIC | Age: 53
End: 2023-12-27

## 2023-12-27 ENCOUNTER — OFFICE VISIT (OUTPATIENT)
Dept: ONCOLOGY | Facility: CLINIC | Age: 53
End: 2023-12-27
Payer: MEDICARE

## 2023-12-27 VITALS
DIASTOLIC BLOOD PRESSURE: 78 MMHG | RESPIRATION RATE: 16 BRPM | HEART RATE: 80 BPM | BODY MASS INDEX: 40.1 KG/M2 | TEMPERATURE: 97.4 F | WEIGHT: 226.3 LBS | OXYGEN SATURATION: 98 % | HEIGHT: 63 IN | SYSTOLIC BLOOD PRESSURE: 122 MMHG

## 2023-12-27 DIAGNOSIS — Z86.718 HISTORY OF DVT (DEEP VEIN THROMBOSIS): ICD-10-CM

## 2023-12-27 DIAGNOSIS — Z79.01 ANTICOAGULANT LONG-TERM USE: ICD-10-CM

## 2023-12-27 DIAGNOSIS — D50.9 IRON DEFICIENCY ANEMIA, UNSPECIFIED IRON DEFICIENCY ANEMIA TYPE: Primary | ICD-10-CM

## 2023-12-27 DIAGNOSIS — D50.9 IRON DEFICIENCY ANEMIA, UNSPECIFIED IRON DEFICIENCY ANEMIA TYPE: ICD-10-CM

## 2023-12-27 DIAGNOSIS — E53.8 LOW SERUM VITAMIN B12: ICD-10-CM

## 2023-12-27 LAB
ALBUMIN SERPL-MCNC: 4.2 G/DL (ref 3.5–5.2)
ALBUMIN/GLOB SERPL: 1.5 G/DL
ALP SERPL-CCNC: 63 U/L (ref 39–117)
ALT SERPL W P-5'-P-CCNC: 9 U/L (ref 1–33)
ANION GAP SERPL CALCULATED.3IONS-SCNC: 12 MMOL/L (ref 5–15)
AST SERPL-CCNC: 11 U/L (ref 1–32)
BASOPHILS # BLD AUTO: 0.05 10*3/MM3 (ref 0–0.2)
BASOPHILS NFR BLD AUTO: 0.7 % (ref 0–1.5)
BILIRUB SERPL-MCNC: 0.2 MG/DL (ref 0–1.2)
BUN SERPL-MCNC: 10 MG/DL (ref 6–20)
BUN/CREAT SERPL: 14.5 (ref 7–25)
CALCIUM SPEC-SCNC: 9.3 MG/DL (ref 8.6–10.5)
CHLORIDE SERPL-SCNC: 109 MMOL/L (ref 98–107)
CO2 SERPL-SCNC: 21 MMOL/L (ref 22–29)
CREAT SERPL-MCNC: 0.69 MG/DL (ref 0.57–1)
DEPRECATED RDW RBC AUTO: 80.2 FL (ref 37–54)
EGFRCR SERPLBLD CKD-EPI 2021: 104.6 ML/MIN/1.73
EOSINOPHIL # BLD AUTO: 0.53 10*3/MM3 (ref 0–0.4)
EOSINOPHIL NFR BLD AUTO: 7.6 % (ref 0.3–6.2)
ERYTHROCYTE [DISTWIDTH] IN BLOOD BY AUTOMATED COUNT: 30.2 % (ref 12.3–15.4)
FERRITIN SERPL-MCNC: 139.2 NG/ML (ref 13–150)
GLOBULIN UR ELPH-MCNC: 2.8 GM/DL
GLUCOSE SERPL-MCNC: 91 MG/DL (ref 65–99)
HCT VFR BLD AUTO: 39.6 % (ref 34–46.6)
HGB BLD-MCNC: 11.3 G/DL (ref 12–15.9)
IRON 24H UR-MRATE: 49 MCG/DL (ref 37–145)
IRON SATN MFR SERPL: 11 % (ref 20–50)
LYMPHOCYTES # BLD AUTO: 2.31 10*3/MM3 (ref 0.7–3.1)
LYMPHOCYTES NFR BLD AUTO: 33 % (ref 19.6–45.3)
MCH RBC QN AUTO: 22.1 PG (ref 26.6–33)
MCHC RBC AUTO-ENTMCNC: 28.5 G/DL (ref 31.5–35.7)
MCV RBC AUTO: 77.5 FL (ref 79–97)
MONOCYTES # BLD AUTO: 0.39 10*3/MM3 (ref 0.1–0.9)
MONOCYTES NFR BLD AUTO: 5.6 % (ref 5–12)
NEUTROPHILS NFR BLD AUTO: 3.7 10*3/MM3 (ref 1.7–7)
NEUTROPHILS NFR BLD AUTO: 53 % (ref 42.7–76)
PLATELET # BLD AUTO: 234 10*3/MM3 (ref 140–450)
POTASSIUM SERPL-SCNC: 3.6 MMOL/L (ref 3.5–5.2)
PROT SERPL-MCNC: 7 G/DL (ref 6–8.5)
RBC # BLD AUTO: 5.11 10*6/MM3 (ref 3.77–5.28)
SODIUM SERPL-SCNC: 142 MMOL/L (ref 136–145)
TIBC SERPL-MCNC: 428 MCG/DL (ref 298–536)
TRANSFERRIN SERPL-MCNC: 287 MG/DL (ref 200–360)
WBC NRBC COR # BLD AUTO: 6.99 10*3/MM3 (ref 3.4–10.8)

## 2023-12-27 PROCEDURE — 85025 COMPLETE CBC W/AUTO DIFF WBC: CPT

## 2023-12-27 PROCEDURE — 84466 ASSAY OF TRANSFERRIN: CPT

## 2023-12-27 PROCEDURE — 83540 ASSAY OF IRON: CPT

## 2023-12-27 PROCEDURE — 80053 COMPREHEN METABOLIC PANEL: CPT

## 2023-12-27 PROCEDURE — 82728 ASSAY OF FERRITIN: CPT

## 2023-12-27 RX ORDER — FAMOTIDINE 10 MG/ML
20 INJECTION, SOLUTION INTRAVENOUS AS NEEDED
OUTPATIENT
Start: 2024-01-12

## 2023-12-27 RX ORDER — DIPHENHYDRAMINE HYDROCHLORIDE 50 MG/ML
50 INJECTION INTRAMUSCULAR; INTRAVENOUS AS NEEDED
OUTPATIENT
Start: 2024-01-05

## 2023-12-27 RX ORDER — SODIUM CHLORIDE 9 MG/ML
20 INJECTION, SOLUTION INTRAVENOUS ONCE
OUTPATIENT
Start: 2024-01-12

## 2023-12-27 RX ORDER — ACETAMINOPHEN 325 MG/1
650 TABLET ORAL ONCE
OUTPATIENT
Start: 2024-01-05

## 2023-12-27 RX ORDER — FAMOTIDINE 10 MG/ML
20 INJECTION, SOLUTION INTRAVENOUS AS NEEDED
OUTPATIENT
Start: 2024-01-05

## 2023-12-27 RX ORDER — SODIUM CHLORIDE 9 MG/ML
20 INJECTION, SOLUTION INTRAVENOUS ONCE
OUTPATIENT
Start: 2024-01-05

## 2023-12-27 RX ORDER — DIPHENHYDRAMINE HYDROCHLORIDE 50 MG/ML
50 INJECTION INTRAMUSCULAR; INTRAVENOUS AS NEEDED
OUTPATIENT
Start: 2024-01-12

## 2023-12-27 RX ORDER — DIPHENHYDRAMINE HCL 25 MG
25 CAPSULE ORAL ONCE
OUTPATIENT
Start: 2024-01-12

## 2023-12-27 RX ORDER — DIPHENHYDRAMINE HCL 25 MG
25 CAPSULE ORAL ONCE
OUTPATIENT
Start: 2024-01-05

## 2023-12-27 RX ORDER — ACETAMINOPHEN 325 MG/1
650 TABLET ORAL ONCE
OUTPATIENT
Start: 2024-01-12

## 2023-12-27 NOTE — PROGRESS NOTES
[facility] HEMATOLOGY & ONCOLOGY  [address] [place] [address]  [facility] [address] [phone]    Patient Name: Yecenia Tabares  Encounter Date: 12/27/2023   YOB: 1970  Patient Number: [id number]    Hematology / Oncology Progress Note      HPI / REASON FOR VISIT: Yecenia Tabares is a 53 y.o. female initially seen by Dr. Espana for iron deficiency, pulmonary embolism and left leg DVT.  The patient presented to the hospital on 12/20/2021 with shortness of breath and was found to have an elevated D-dimer along with low iron studies.  A CT chest angiogram on 12/20/2021 showed diffuse bilateral pulmonary emboli involving the distal main pulmonary arteries extending into the subsegmental branches.  Mild right ventricular heart strain with RV to LV ratio measuring 1.41.  Scattered groundglass opacities may represent multifocal pneumonia and/or ischemic changes of the parenchyma.  The patient was started on a heparin drip.     Of note, the patient had J&J vaccine in April 2021 and reported difficulty since that time with a right facial droop and difficulty with her speech since May or June of this year (!!!)  She and her sister state that she has been sick since then on and off without stop and symptoms started 2 weeks after the vaccine. Her sister also states that she had a seizure at that time. She does have a history of syncope when she was younger. PPM was checked recently and as per patient and sister was told that it was working fine     Lower extremity Dopplers done on 12/21/21 showed evidence of a DVT in the common femoral, profunda femoris, superficial femoral, popliteal, peroneal and posterior tibial veins in the left lower extremity.  There was no evidence of a DVT in the right side.  The patient also was noted to have superficial thrombus in the saphenous junction in the left lower extremity.  She stated that around this time, she was  "mostly in bed due to thinking she had pneumonia.      In addition to all this, the patient was noted to have a hemoglobin of 6.8 with an MCV of 72.9.  She was given 1 unit of packed red blood cells and also was given Venofer 200 mg for 2 days (as per patient had 2 iron infusions although not documented in the Epic chart). Patient has a history of gastric sleeve procedure and may not absorb oral iron and will likely need IV Venofer transfusions as an outpatient     The patient denied any history of malignancy or previous hypercoagulable state but did state that her father had malignancy with brain involvement as well as family member with breast cancer.  The patient is a smoker.     The patient was started on Lovenox and then transitioned to Eliquis and then changed to  Xarelto (due to insurance) on 2021. She states that she takes the medications as directed without missing a dose.      She denies family history of clotting but there are many family members and the patient have had miscarriages (she personally had 2   In the early part of the pregnancies)     She complains of pain in the right lower leg \"burning if I keep it straight but goes away when I straighten the leg\".      She no longer gets menstruation (has not had one for 7-8 years). She denies bleeding anywhere else.  She denies any signs of PICA syndrome.      The inheritable thrombophilia panel includes the followin.  APC resistance/factor V Leiden WNL  2.  Prothrombin gene mutation  3.  Protein C functional assay shows elevation in protein C antigen at 165 and elevation of protein C functional at 190 --elevations in protein C are usually not clinically significant but may indicate chronic inflammation or inflammatory disorder  4. protein S free protein and antigen assay shows elevation of free antigen at 145 but normal total antigen at 127 and functional protein S at 123 -- elevations in protein C are usually not clinically significant but " may indicate chronic inflammation or inflammatory disorder  5. Antithrombin III -elevated at 154 with a normal antigen at 110 -elevations in Antithrombin III do not correspond with clotting disorder but may theoretically correspond to a bleeding disorder which this patient does not appear to have  6.  PT/PTT/INR  7.  Anticardiolipin antibody WNL  8.  Beta-2 glycoprotein antibodies WNL  9.  Lupus anticoagulant POSITIVE -- if positive again in 12 weeks, would qualify patient for APLA Syndrome and would recommend change of anticoagulants to coumadin.  However, please note, patient is taking DOAC which can interfere and give a false positive lupus anticoagulant. Please note:  LAC can be FALSELY ELEVATED in patients on DOAC.  In an attempt to make sure that the LAC is not a false positive, in those patients taking a DOAC, can take off for 48 hours and recheck the LAC     INTERVAL HISTORY     Pt presents to clinic today for follow up.  She received Venofer 200 mg on November 22, December 4, 11 and 18, 2023 and tolerated the infusions well.     She continues to take Xarelto 20 mg daily w/o any s/s of bleeding or toxicity.   She complains of some persistent fatigue.    She had labs drawn and results were reviewed with her in office.        LABS    Lab Results - Last 18 Months   Lab Units 12/27/23  1145 11/22/23  0953 11/15/23  1329   HEMOGLOBIN g/dL 11.3* 9.3* 8.7*   HEMATOCRIT % 39.6 34.9 31.4*   MCV fL 77.5* 72.9* 72.2*   WBC 10*3/mm3 6.99 6.61 7.09   RDW % 30.2* 21.0* 21.1*   PLATELETS 10*3/mm3 234 305 339   NEUTROS ABS 10*3/mm3 3.70 3.83 2.98   LYMPHS ABS 10*3/mm3 2.31  --   --    MONOS ABS 10*3/mm3 0.39  --   --    EOS ABS 10*3/mm3 0.53* 0.53* 0.78*   BASOS ABS 10*3/mm3 0.05 0.07 0.07   NRBC /100 WBC  --   --  1.0*   NEUTROPHIL % %  --  57.0 41.0*   MONOCYTES % %  --  4.0* 6.0   BASOPHIL % %  --  1.0 1.0   ATYP LYMPH % %  --  4.0 2.0   ANISOCYTOSIS   --  Slight/1+ Slight/1+       Lab Results - Last 18 Months   Lab  "Units 12/27/23  1145 11/15/23  1329   GLUCOSE mg/dL 91 104*   SODIUM mmol/L 142 142   POTASSIUM mmol/L 3.6 4.0   CO2 mmol/L 21.0* 24.0   CHLORIDE mmol/L 109* 108*   ANION GAP mmol/L 12.0 10.0   CREATININE mg/dL 0.69 0.69   BUN mg/dL 10 14   BUN / CREAT RATIO  14.5 20.3   CALCIUM mg/dL 9.3 8.7   ALK PHOS U/L 63 73   TOTAL PROTEIN g/dL 7.0 6.6   ALT (SGPT) U/L 9 10   AST (SGOT) U/L 11 11   BILIRUBIN mg/dL 0.2 0.2   ALBUMIN g/dL 4.2 3.7   GLOBULIN gm/dL 2.8 2.9       No results for input(s): \"MSPIKE\", \"KAPPALAMB\", \"IGLFLC\", \"URICACID\", \"FREEKAPPAL\", \"CEA\", \"LDH\", \"REFLABREPO\" in the last 54202 hours.    Lab Results - Last 18 Months   Lab Units 12/27/23  1145 11/15/23  1329   IRON mcg/dL 49 16*   TIBC mcg/dL 428 475   IRON SATURATION (TSAT) % 11* 3*   FERRITIN ng/mL 139.20 10.09*   FOLATE ng/mL  --  4.90         PAST MEDICAL HISTORY:  ALLERGIES:  Allergies   Allergen Reactions    Bee Venom Swelling    Erythromycin Shortness Of Breath    Penicillins Hives    Ampicillin Hives     CURRENT MEDICATIONS:  Outpatient Encounter Medications as of 12/27/2023   Medication Sig Dispense Refill    albuterol (PROVENTIL) (2.5 MG/3ML) 0.083% nebulizer solution Take 2.5 mg by nebulization Every 4 (Four) Hours As Needed for Wheezing.      albuterol sulfate  (90 Base) MCG/ACT inhaler Inhale 2 puffs Every 4 (Four) Hours As Needed for Wheezing.      Atogepant (Qulipta) 60 MG tablet Take 1 tablet by mouth Daily. 30 tablet 5    butalbital-acetaminophen-caffeine (FIORICET, ESGIC) -40 MG per tablet Take 1 tablet by mouth Daily As Needed for Headache.      DULoxetine (CYMBALTA) 60 MG capsule Take 90 mg by mouth Every Night. Pt takes a 30 mg and a 60 mg for a total of 90 mg      escitalopram (LEXAPRO) 10 MG tablet Take 1 tablet by mouth Daily.      gabapentin (NEURONTIN) 300 MG capsule Take 1 capsule by mouth 3 (Three) Times a Day.      meclizine (ANTIVERT) 25 MG tablet Take 1 tablet by mouth 3 (Three) Times a Day As Needed for " "Dizziness.      Needle, Disp, 25G X 1\" misc 10 each Daily. 2 each 0    oxyCODONE-acetaminophen (PERCOCET) 7.5-325 MG per tablet Take 1 tablet by mouth 3 (Three) Times a Day As Needed.      pantoprazole (PROTONIX) 40 MG EC tablet Take 1 tablet by mouth 2 (Two) Times a Day.      promethazine (PHENERGAN) 25 MG tablet Take 0.5 tablets by mouth Every 6 (Six) Hours As Needed for Nausea or Vomiting.      Rimegepant Sulfate (Nurtec) 75 MG tablet dispersible tablet Take 1 tablet by mouth 1 (One) Time As Needed (migraine). 16 tablet 5    rivaroxaban (XARELTO) 20 MG tablet Take 1 tablet by mouth Daily.      rOPINIRole (REQUIP) 1 MG tablet Take 1 tablet by mouth 2 (Two) Times a Day. Take 1 hour before bedtime.       No facility-administered encounter medications on file as of 12/27/2023.     ADULT ILLNESSES:  Patient Active Problem List   Diagnosis Code    Pulmonary embolism, bilateral I26.99    Microcytic anemia D50.9    Tobacco abuse Z72.0    Chronic back pain M54.9, G89.29    Obesity, Class III, BMI 40-49.9 (morbid obesity) E66.01    Acute deep vein thrombosis (DVT) of left lower extremity I82.402    Iron deficiency anemia D50.9    B12 deficiency E53.8     SURGERIES:  Past Surgical History:   Procedure Laterality Date    BREAST SURGERY      reduction and lift    CHOLECYSTECTOMY      DILATATION AND CURETTAGE      GASTRIC SLEEVE LAPAROSCOPIC  2016    INSERT / REPLACE / REMOVE PACEMAKER      at 23 years old due to syncope    PACEMAKER IMPLANTATION      REDUCTION MAMMAPLASTY       HEALTH MAINTENANCE ITEMS:  Health Maintenance Due   Topic Date Due    BMI FOLLOWUP  Never done    Pneumococcal Vaccine 0-64 (1 of 2 - PCV) Never done    ZOSTER VACCINE (1 of 2) Never done    HEPATITIS C SCREENING  Never done    ANNUAL WELLNESS VISIT  Never done    PAP SMEAR  Never done    LUNG CANCER SCREENING  12/20/2022    COLORECTAL CANCER SCREENING  12/20/2022    INFLUENZA VACCINE  Never done    COVID-19 Vaccine (2 - 2023-24 season) 09/01/2023 "       <no information>  Last Completed Colonoscopy       This patient has no relevant Health Maintenance data.          Immunization History   Administered Date(s) Administered    COVID-19 ([employer]) 04/07/2021     Last Completed Mammogram            MAMMOGRAM (Every 2 Years) Next due on 2/14/2024 02/14/2022  Mammo Screening Digital Tomosynthesis Bilateral With CAD                      FAMILY HISTORY:  Family History   Problem Relation Age of Onset    Stroke Mother     Kidney failure Mother     Diabetes Mother     Miscarriages / Stillbirths Mother         x2    Cancer Father 60        brain    Diabetes Sister     Hypertension Sister     Kidney disease Brother     Depression Brother     Miscarriages / Stillbirths Maternal Grandmother         x3    Heart attack Sister     Transient ischemic attack Sister         non-hemorrhagoic    Multiple sclerosis Sister     Miscarriages / Stillbirths Sister         x1    Diabetes Sister     Obesity Sister     Diabetes Sister     Stroke Sister     Diabetes Brother     Obesity Brother     Gout Brother     Breast cancer Maternal Aunt      SOCIAL HISTORY:  Social History     Socioeconomic History    Marital status:    Tobacco Use    Smoking status: Every Day     Packs/day: 1.00     Years: 30.00     Additional pack years: 0.00     Total pack years: 30.00     Types: Cigarettes     Passive exposure: Never    Smokeless tobacco: Never   Vaping Use    Vaping Use: Never used   Substance and Sexual Activity    Alcohol use: No    Drug use: No    Sexual activity: Defer       REVIEW OF SYSTEMS:  Review of Systems   Constitutional:  Positive for fatigue. Negative for fever.   HENT:  Negative for trouble swallowing.    Respiratory:  Positive for shortness of breath. Negative for cough.    Cardiovascular:  Negative for chest pain, palpitations and leg swelling.   Gastrointestinal:  Negative for nausea and vomiting.   Genitourinary:  Negative for hematuria.   Musculoskeletal:  Negative  "for arthralgias and myalgias.   Skin:  Negative for rash, skin lesions and wound.   Neurological:  Negative for dizziness, syncope, memory problem and confusion.   Psychiatric/Behavioral:  Negative for suicidal ideas and depressed mood. The patient is not nervous/anxious.        /78   Pulse 80   Temp 97.4 °F (36.3 °C)   Resp 16   Ht 160 cm (63\")   Wt 103 kg (226 lb 4.8 oz)   SpO2 98%   BMI 40.09 kg/m²  Body surface area is 2.04 meters squared.    Pain Score    12/27/23 1302   PainSc:   7   PainLoc: Back          Physical Exam  Constitutional:       Appearance: Normal appearance.   HENT:      Head: Normocephalic and atraumatic.   Cardiovascular:      Rate and Rhythm: Normal rate and regular rhythm.   Pulmonary:      Effort: Pulmonary effort is normal.      Breath sounds: Normal breath sounds.   Abdominal:      General: Bowel sounds are normal.      Palpations: Abdomen is soft.   Musculoskeletal:      Right lower leg: No edema.      Left lower leg: No edema.   Skin:     General: Skin is warm and dry.   Neurological:      Mental Status: She is alert and oriented to person, place, and time.   Psychiatric:         Attention and Perception: Attention normal.         Mood and Affect: Mood normal.         Judgment: Judgment normal.         Yecenia Tabares reports a pain score of 7.  Given her pain assessment as noted, treatment options were discussed and the following options were decided upon as a follow-up plan to address the patient's pain: continuation of current treatment plan for pain and pt takes gabapentin, percocet and Fioricet .           ASSESSMENT / PLAN    1. Iron deficiency anemia, unspecified iron deficiency anemia type    2. Low serum vitamin B12    3. History of DVT (deep vein thrombosis)    4. Anticoagulant long-term use           ASSESSMENT:     Iron Deficiency   -Pt received Venofer 200 mg on November 22, December 4, 11 and 18, 2023  -Labs today:  Hgb 11.3, Hct 39.6, iron 49, Ferritin " 139, Sat 11%, TIBC 428  -As Hgb and Sat are still slightly low, will order two additional Venofer infusions.     Low B12   -monthly b12 injections   -Lab pending today    History of DVT  Long term Anticoagulation   -Pt is taking Xarelto 20 mg PO daily   -Pt denies s/s of bleeding or toxicity     PLAN:  Pt will receive Venofer 200 mg IV weekly x 2  Labs only in 6 weeks  RTC in 3 months.    Patient will have preoffice labs.    Orders have been signed.    Continue current medications, treatment plans and follow up with PCP and any other providers.  Care discussed with patient.  Understanding expressed.  Patient agreeable with plan.        ACP discussion was held with the patient during this visit. Patient has an advance directive in EMR which is still valid.           Linh Lau, APRN  12/27/2023

## 2023-12-27 NOTE — LETTER
December 27, 2023     Sushil Nash MD  [address]  [facility] [address]    Patient: Yecenia Tabares   YOB: 1970   Date of Visit: 12/27/2023       Dear Sushil Nash MD    Yecenia Tabares was in my office today. Below is a copy of my note.    If you have questions, please do not hesitate to call me. I look forward to following Yecenia along with you.         Sincerely,        HELADIO Mcmillan        CC: No Recipients    [facility] HEMATOLOGY & ONCOLOGY  [address]  [place] [address]  [phone]    Patient Name: Yecenia Tabares  Encounter Date: 12/27/2023   YOB: 1970  Patient Number: [id number]    Hematology / Oncology Progress Note      HPI / REASON FOR VISIT: Yecenia Tabares is a 53 y.o. female initially seen by Dr. Espana for iron deficiency, pulmonary embolism and left leg DVT.  The patient presented to the hospital on 12/20/2021 with shortness of breath and was found to have an elevated D-dimer along with low iron studies.  A CT chest angiogram on 12/20/2021 showed diffuse bilateral pulmonary emboli involving the distal main pulmonary arteries extending into the subsegmental branches.  Mild right ventricular heart strain with RV to LV ratio measuring 1.41.  Scattered groundglass opacities may represent multifocal pneumonia and/or ischemic changes of the parenchyma.  The patient was started on a heparin drip.     Of note, the patient had J&J vaccine in April 2021 and reported difficulty since that time with a right facial droop and difficulty with her speech since May or June of this year (!!!)  She and her sister state that she has been sick since then on and off without stop and symptoms started 2 weeks after the vaccine. Her sister also states that she had a seizure at that time. She does have a history of syncope when she was younger. PPM was checked recently and as per patient and  "sister was told that it was working fine     Lower extremity Dopplers done on 21 showed evidence of a DVT in the common femoral, profunda femoris, superficial femoral, popliteal, peroneal and posterior tibial veins in the left lower extremity.  There was no evidence of a DVT in the right side.  The patient also was noted to have superficial thrombus in the saphenous junction in the left lower extremity.  She stated that around this time, she was mostly in bed due to thinking she had pneumonia.      In addition to all this, the patient was noted to have a hemoglobin of 6.8 with an MCV of 72.9.  She was given 1 unit of packed red blood cells and also was given Venofer 200 mg for 2 days (as per patient had 2 iron infusions although not documented in the Epic chart). Patient has a history of gastric sleeve procedure and may not absorb oral iron and will likely need IV Venofer transfusions as an outpatient     The patient denied any history of malignancy or previous hypercoagulable state but did state that her father had malignancy with brain involvement as well as family member with breast cancer.  The patient is a smoker.     The patient was started on Lovenox and then transitioned to Eliquis and then changed to  Xarelto (due to insurance) on 2021. She states that she takes the medications as directed without missing a dose.      She denies family history of clotting but there are many family members and the patient have had miscarriages (she personally had 2   In the early part of the pregnancies)     She complains of pain in the right lower leg \"burning if I keep it straight but goes away when I straighten the leg\".      She no longer gets menstruation (has not had one for 7-8 years). She denies bleeding anywhere else.  She denies any signs of PICA syndrome.      The inheritable thrombophilia panel includes the followin.  APC resistance/factor V Leiden WNL  2.  Prothrombin gene mutation  3.  Protein " C functional assay shows elevation in protein C antigen at 165 and elevation of protein C functional at 190 --elevations in protein C are usually not clinically significant but may indicate chronic inflammation or inflammatory disorder  4. protein S free protein and antigen assay shows elevation of free antigen at 145 but normal total antigen at 127 and functional protein S at 123 -- elevations in protein C are usually not clinically significant but may indicate chronic inflammation or inflammatory disorder  5. Antithrombin III -elevated at 154 with a normal antigen at 110 -elevations in Antithrombin III do not correspond with clotting disorder but may theoretically correspond to a bleeding disorder which this patient does not appear to have  6.  PT/PTT/INR  7.  Anticardiolipin antibody WNL  8.  Beta-2 glycoprotein antibodies WNL  9.  Lupus anticoagulant POSITIVE -- if positive again in 12 weeks, would qualify patient for APLA Syndrome and would recommend change of anticoagulants to coumadin.  However, please note, patient is taking DOAC which can interfere and give a false positive lupus anticoagulant. Please note:  LAC can be FALSELY ELEVATED in patients on DOAC.  In an attempt to make sure that the LAC is not a false positive, in those patients taking a DOAC, can take off for 48 hours and recheck the LAC     INTERVAL HISTORY     Pt presents to clinic today for follow up./    Pt received Venofer 200 mg on November 22, December 4, 11 and 18, 2023    She continues to take Xarelto 20 mg daily w/o any s/s of bleeding or toxicity.   She complains of worsening fatigue.     She had labs drawn and results were reviewed with her in office.        LABS    Lab Results - Last 18 Months   Lab Units 11/22/23  0953 11/15/23  1329   HEMOGLOBIN g/dL 9.3* 8.7*   HEMATOCRIT % 34.9 31.4*   MCV fL 72.9* 72.2*   WBC 10*3/mm3 6.61 7.09   RDW % 21.0* 21.1*   PLATELETS 10*3/mm3 305 339   NEUTROS ABS 10*3/mm3 3.83 2.98   EOS ABS 10*3/mm3  "0.53* 0.78*   BASOS ABS 10*3/mm3 0.07 0.07   NRBC /100 WBC  --  1.0*   NEUTROPHIL % % 57.0 41.0*   MONOCYTES % % 4.0* 6.0   BASOPHIL % % 1.0 1.0   ATYP LYMPH % % 4.0 2.0   ANISOCYTOSIS  Slight/1+ Slight/1+       Lab Results - Last 18 Months   Lab Units 11/15/23  1329   GLUCOSE mg/dL 104*   SODIUM mmol/L 142   POTASSIUM mmol/L 4.0   CO2 mmol/L 24.0   CHLORIDE mmol/L 108*   ANION GAP mmol/L 10.0   CREATININE mg/dL 0.69   BUN mg/dL 14   BUN / CREAT RATIO  20.3   CALCIUM mg/dL 8.7   ALK PHOS U/L 73   TOTAL PROTEIN g/dL 6.6   ALT (SGPT) U/L 10   AST (SGOT) U/L 11   BILIRUBIN mg/dL 0.2   ALBUMIN g/dL 3.7   GLOBULIN gm/dL 2.9       No results for input(s): \"MSPIKE\", \"KAPPALAMB\", \"IGLFLC\", \"URICACID\", \"FREEKAPPAL\", \"CEA\", \"LDH\", \"REFLABREPO\" in the last 59843 hours.    Lab Results - Last 18 Months   Lab Units 11/15/23  1329   IRON mcg/dL 16*   TIBC mcg/dL 475   IRON SATURATION (TSAT) % 3*   FERRITIN ng/mL 10.09*   FOLATE ng/mL 4.90         PAST MEDICAL HISTORY:  ALLERGIES:  Allergies   Allergen Reactions   • Bee Venom Swelling   • Erythromycin Shortness Of Breath   • Penicillins Hives   • Ampicillin Hives     CURRENT MEDICATIONS:  Outpatient Encounter Medications as of 12/27/2023   Medication Sig Dispense Refill   • albuterol (PROVENTIL) (2.5 MG/3ML) 0.083% nebulizer solution Take 2.5 mg by nebulization Every 4 (Four) Hours As Needed for Wheezing.     • albuterol sulfate  (90 Base) MCG/ACT inhaler Inhale 2 puffs Every 4 (Four) Hours As Needed for Wheezing.     • Atogepant (Qulipta) 60 MG tablet Take 1 tablet by mouth Daily. 30 tablet 5   • butalbital-acetaminophen-caffeine (FIORICET, ESGIC) -40 MG per tablet Take 1 tablet by mouth Daily As Needed for Headache.     • DULoxetine (CYMBALTA) 60 MG capsule Take 90 mg by mouth Every Night. Pt takes a 30 mg and a 60 mg for a total of 90 mg     • escitalopram (LEXAPRO) 10 MG tablet Take 1 tablet by mouth Daily.     • gabapentin (NEURONTIN) 300 MG capsule Take 1 capsule " "by mouth 3 (Three) Times a Day.     • meclizine (ANTIVERT) 25 MG tablet Take 1 tablet by mouth 3 (Three) Times a Day As Needed for Dizziness.     • Needle, Disp, 25G X 1\" misc 10 each Daily. 2 each 0   • oxyCODONE-acetaminophen (PERCOCET) 7.5-325 MG per tablet Take 1 tablet by mouth 3 (Three) Times a Day As Needed.     • pantoprazole (PROTONIX) 40 MG EC tablet Take 1 tablet by mouth 2 (Two) Times a Day.     • promethazine (PHENERGAN) 25 MG tablet Take 0.5 tablets by mouth Every 6 (Six) Hours As Needed for Nausea or Vomiting.     • Rimegepant Sulfate (Nurtec) 75 MG tablet dispersible tablet Take 1 tablet by mouth 1 (One) Time As Needed (migraine). 16 tablet 5   • rivaroxaban (XARELTO) 20 MG tablet Take 1 tablet by mouth Daily.     • rOPINIRole (REQUIP) 1 MG tablet Take 1 tablet by mouth 2 (Two) Times a Day. Take 1 hour before bedtime.       No facility-administered encounter medications on file as of 12/27/2023.     ADULT ILLNESSES:  Patient Active Problem List   Diagnosis Code   • Pulmonary embolism, bilateral I26.99   • Microcytic anemia D50.9   • Tobacco abuse Z72.0   • Chronic back pain M54.9, G89.29   • Obesity, Class III, BMI 40-49.9 (morbid obesity) E66.01   • Acute deep vein thrombosis (DVT) of left lower extremity I82.402   • Iron deficiency anemia D50.9   • B12 deficiency E53.8     SURGERIES:  Past Surgical History:   Procedure Laterality Date   • BREAST SURGERY      reduction and lift   • CHOLECYSTECTOMY     • DILATATION AND CURETTAGE     • GASTRIC SLEEVE LAPAROSCOPIC  2016   • INSERT / REPLACE / REMOVE PACEMAKER      at 23 years old due to syncope   • PACEMAKER IMPLANTATION     • REDUCTION MAMMAPLASTY       HEALTH MAINTENANCE ITEMS:  Health Maintenance Due   Topic Date Due   • BMI FOLLOWUP  Never done   • Pneumococcal Vaccine 0-64 (1 of 2 - PCV) Never done   • ZOSTER VACCINE (1 of 2) Never done   • HEPATITIS C SCREENING  Never done   • ANNUAL WELLNESS VISIT  Never done   • PAP SMEAR  Never done   • LUNG " CANCER SCREENING  12/20/2022   • COLORECTAL CANCER SCREENING  12/20/2022   • INFLUENZA VACCINE  Never done   • COVID-19 Vaccine (2 - 2023-24 season) 09/01/2023       <no information>  Last Completed Colonoscopy       This patient has no relevant Health Maintenance data.          Immunization History   Administered Date(s) Administered   • COVID-19 ([facility]I) 04/07/2021     Last Completed Mammogram            MAMMOGRAM (Every 2 Years) Next due on 2/14/2024 02/14/2022  Mammo Screening Digital Tomosynthesis Bilateral With CAD                      FAMILY HISTORY:  Family History   Problem Relation Age of Onset   • Stroke Mother    • Kidney failure Mother    • Diabetes Mother    • Miscarriages / Stillbirths Mother         x2   • Cancer Father 60        brain   • Diabetes Sister    • Hypertension Sister    • Kidney disease Brother    • Depression Brother    • Miscarriages / Stillbirths Maternal Grandmother         x3   • Heart attack Sister    • Transient ischemic attack Sister         non-hemorrhagoic   • Multiple sclerosis Sister    • Miscarriages / Stillbirths Sister         x1   • Diabetes Sister    • Obesity Sister    • Diabetes Sister    • Stroke Sister    • Diabetes Brother    • Obesity Brother    • Gout Brother    • Breast cancer Maternal Aunt      SOCIAL HISTORY:  Social History     Socioeconomic History   • Marital status:    Tobacco Use   • Smoking status: Every Day     Packs/day: 1.00     Years: 30.00     Additional pack years: 0.00     Total pack years: 30.00     Types: Cigarettes     Passive exposure: Never   • Smokeless tobacco: Never   Vaping Use   • Vaping Use: Never used   Substance and Sexual Activity   • Alcohol use: No   • Drug use: No   • Sexual activity: Defer       REVIEW OF SYSTEMS:  Review of Systems   Constitutional:  Positive for fatigue. Negative for fever.   HENT:  Negative for trouble swallowing.    Respiratory:  Positive for shortness of breath. Negative for cough.     Cardiovascular:  Negative for chest pain, palpitations and leg swelling.   Gastrointestinal:  Negative for nausea and vomiting.   Genitourinary:  Negative for hematuria.   Musculoskeletal:  Negative for arthralgias and myalgias.   Skin:  Negative for rash, skin lesions and wound.   Neurological:  Negative for dizziness, syncope, memory problem and confusion.   Psychiatric/Behavioral:  Negative for suicidal ideas and depressed mood. The patient is not nervous/anxious.        There were no vitals taken for this visit. There is no height or weight on file to calculate BSA.    There were no vitals filed for this visit.       Physical Exam  Constitutional:       Appearance: Normal appearance.   HENT:      Head: Normocephalic and atraumatic.   Cardiovascular:      Rate and Rhythm: Normal rate and regular rhythm.   Pulmonary:      Effort: Pulmonary effort is normal.      Breath sounds: Normal breath sounds.   Abdominal:      General: Bowel sounds are normal.      Palpations: Abdomen is soft.   Musculoskeletal:      Right lower leg: No edema.      Left lower leg: No edema.   Skin:     General: Skin is warm and dry.   Neurological:      Mental Status: She is alert and oriented to person, place, and time.   Psychiatric:         Attention and Perception: Attention normal.         Mood and Affect: Mood normal.         Judgment: Judgment normal.         Yecenia Tabares reports a pain score of .  Given her pain assessment as noted, treatment options were discussed and the following options were decided upon as a follow-up plan to address the patient's pain: continuation of current treatment plan for pain and pt takes gabapentin, percocet and Fioricet .           ASSESSMENT / PLAN    No diagnosis found.       ASSESSMENT:     Iron Deficiency   -Pt received Venofer 200 mg on November 22, December 4, 11 and 18, 2023  -Labs today:  Hgb 11.3, Hct 39.6, iron 49, Ferritin 139, Sat 11%, TIBC 428    Low B12   -B12 today 343  -Will  order monthly B12 injections     History of DVT  Long term Anticoagulation   -Pt is taking Xarelto 20 mg PO daily   -Pt denies s/s of bleeding or toxicity     PLAN:  Pt will receive Venofer 200 mg IV weekly x 2  Labs only in 6 weeks  Preoffice labs   Patient will have preoffice labs.     Continue current medications, treatment plans and follow up with PCP and any other providers.  Care discussed with patient.  Understanding expressed.  Patient agreeable with plan.        ACP discussion was held with the patient during this visit. Patient has an advance directive in EMR which is still valid.           Linh Lau, APRN  12/27/2023

## 2024-01-05 ENCOUNTER — INFUSION (OUTPATIENT)
Dept: ONCOLOGY | Facility: HOSPITAL | Age: 54
End: 2024-01-05
Payer: MEDICARE

## 2024-01-05 VITALS
BODY MASS INDEX: 40.09 KG/M2 | OXYGEN SATURATION: 97 % | RESPIRATION RATE: 18 BRPM | TEMPERATURE: 97.4 F | DIASTOLIC BLOOD PRESSURE: 82 MMHG | HEART RATE: 70 BPM | HEIGHT: 63 IN | SYSTOLIC BLOOD PRESSURE: 147 MMHG

## 2024-01-05 DIAGNOSIS — D50.9 IRON DEFICIENCY ANEMIA, UNSPECIFIED IRON DEFICIENCY ANEMIA TYPE: Primary | ICD-10-CM

## 2024-01-05 PROCEDURE — 25810000003 SODIUM CHLORIDE 0.9 % SOLUTION: Performed by: NURSE PRACTITIONER

## 2024-01-05 PROCEDURE — 63710000001 ACETAMINOPHEN 325 MG TABLET: Performed by: NURSE PRACTITIONER

## 2024-01-05 PROCEDURE — 25010000002 IRON SUCROSE PER 1 MG: Performed by: NURSE PRACTITIONER

## 2024-01-05 PROCEDURE — A9270 NON-COVERED ITEM OR SERVICE: HCPCS | Performed by: NURSE PRACTITIONER

## 2024-01-05 PROCEDURE — 63710000001 DIPHENHYDRAMINE PER 50 MG: Performed by: NURSE PRACTITIONER

## 2024-01-05 PROCEDURE — 96365 THER/PROPH/DIAG IV INF INIT: CPT

## 2024-01-05 PROCEDURE — 96374 THER/PROPH/DIAG INJ IV PUSH: CPT

## 2024-01-05 RX ORDER — FAMOTIDINE 10 MG/ML
20 INJECTION, SOLUTION INTRAVENOUS AS NEEDED
Status: DISCONTINUED | OUTPATIENT
Start: 2024-01-05 | End: 2024-01-05 | Stop reason: HOSPADM

## 2024-01-05 RX ORDER — ACETAMINOPHEN 325 MG/1
650 TABLET ORAL ONCE
Status: COMPLETED | OUTPATIENT
Start: 2024-01-05 | End: 2024-01-05

## 2024-01-05 RX ORDER — SODIUM CHLORIDE 9 MG/ML
20 INJECTION, SOLUTION INTRAVENOUS ONCE
Status: COMPLETED | OUTPATIENT
Start: 2024-01-05 | End: 2024-01-05

## 2024-01-05 RX ORDER — DIPHENHYDRAMINE HYDROCHLORIDE 50 MG/ML
50 INJECTION INTRAMUSCULAR; INTRAVENOUS AS NEEDED
Status: DISCONTINUED | OUTPATIENT
Start: 2024-01-05 | End: 2024-01-05 | Stop reason: HOSPADM

## 2024-01-05 RX ORDER — DIPHENHYDRAMINE HCL 25 MG
25 CAPSULE ORAL ONCE
Status: COMPLETED | OUTPATIENT
Start: 2024-01-05 | End: 2024-01-05

## 2024-01-05 RX ADMIN — SODIUM CHLORIDE 20 ML/HR: 9 INJECTION, SOLUTION INTRAVENOUS at 11:14

## 2024-01-05 RX ADMIN — ACETAMINOPHEN 650 MG: 325 TABLET, FILM COATED ORAL at 11:05

## 2024-01-05 RX ADMIN — IRON SUCROSE 200 MG: 20 INJECTION, SOLUTION INTRAVENOUS at 11:11

## 2024-01-05 RX ADMIN — DIPHENHYDRAMINE HYDROCHLORIDE 25 MG: 25 CAPSULE ORAL at 11:05

## 2024-01-18 ENCOUNTER — INFUSION (OUTPATIENT)
Dept: ONCOLOGY | Facility: HOSPITAL | Age: 54
End: 2024-01-18
Payer: MEDICARE

## 2024-01-18 VITALS
OXYGEN SATURATION: 98 % | DIASTOLIC BLOOD PRESSURE: 84 MMHG | SYSTOLIC BLOOD PRESSURE: 131 MMHG | TEMPERATURE: 97.1 F | WEIGHT: 221.6 LBS | HEART RATE: 93 BPM | BODY MASS INDEX: 39.27 KG/M2 | RESPIRATION RATE: 18 BRPM | HEIGHT: 63 IN

## 2024-01-18 DIAGNOSIS — D50.9 IRON DEFICIENCY ANEMIA, UNSPECIFIED IRON DEFICIENCY ANEMIA TYPE: Primary | ICD-10-CM

## 2024-01-18 PROCEDURE — 96365 THER/PROPH/DIAG IV INF INIT: CPT

## 2024-01-18 PROCEDURE — 63710000001 DIPHENHYDRAMINE PER 50 MG: Performed by: NURSE PRACTITIONER

## 2024-01-18 PROCEDURE — 96374 THER/PROPH/DIAG INJ IV PUSH: CPT

## 2024-01-18 PROCEDURE — 25810000003 SODIUM CHLORIDE 0.9 % SOLUTION: Performed by: NURSE PRACTITIONER

## 2024-01-18 PROCEDURE — A9270 NON-COVERED ITEM OR SERVICE: HCPCS | Performed by: NURSE PRACTITIONER

## 2024-01-18 PROCEDURE — 25010000002 IRON SUCROSE PER 1 MG: Performed by: NURSE PRACTITIONER

## 2024-01-18 PROCEDURE — 63710000001 ACETAMINOPHEN 325 MG TABLET: Performed by: NURSE PRACTITIONER

## 2024-01-18 RX ORDER — ACETAMINOPHEN 325 MG/1
325 TABLET ORAL ONCE
Status: COMPLETED | OUTPATIENT
Start: 2024-01-18 | End: 2024-01-18

## 2024-01-18 RX ORDER — FAMOTIDINE 10 MG/ML
20 INJECTION, SOLUTION INTRAVENOUS AS NEEDED
Status: DISCONTINUED | OUTPATIENT
Start: 2024-01-18 | End: 2024-01-18 | Stop reason: HOSPADM

## 2024-01-18 RX ORDER — DIPHENHYDRAMINE HCL 25 MG
25 CAPSULE ORAL ONCE
Status: COMPLETED | OUTPATIENT
Start: 2024-01-18 | End: 2024-01-18

## 2024-01-18 RX ORDER — ACETAMINOPHEN 325 MG/1
650 TABLET ORAL ONCE
Status: COMPLETED | OUTPATIENT
Start: 2024-01-18 | End: 2024-01-18

## 2024-01-18 RX ORDER — SODIUM CHLORIDE 9 MG/ML
20 INJECTION, SOLUTION INTRAVENOUS ONCE
Status: COMPLETED | OUTPATIENT
Start: 2024-01-18 | End: 2024-01-18

## 2024-01-18 RX ORDER — DIPHENHYDRAMINE HYDROCHLORIDE 50 MG/ML
50 INJECTION INTRAMUSCULAR; INTRAVENOUS AS NEEDED
Status: DISCONTINUED | OUTPATIENT
Start: 2024-01-18 | End: 2024-01-18 | Stop reason: HOSPADM

## 2024-01-18 RX ADMIN — ACETAMINOPHEN 325 MG: 325 TABLET, FILM COATED ORAL at 10:32

## 2024-01-18 RX ADMIN — DIPHENHYDRAMINE HYDROCHLORIDE 25 MG: 25 CAPSULE ORAL at 10:27

## 2024-01-18 RX ADMIN — ACETAMINOPHEN 325 MG: 325 TABLET, FILM COATED ORAL at 10:27

## 2024-01-18 RX ADMIN — SODIUM CHLORIDE 20 ML/HR: 9 INJECTION, SOLUTION INTRAVENOUS at 10:20

## 2024-01-18 RX ADMIN — IRON SUCROSE 200 MG: 20 INJECTION, SOLUTION INTRAVENOUS at 10:45

## 2024-02-07 ENCOUNTER — LAB (OUTPATIENT)
Dept: LAB | Facility: HOSPITAL | Age: 54
End: 2024-02-07
Payer: MEDICARE

## 2024-02-07 DIAGNOSIS — D50.9 IRON DEFICIENCY ANEMIA, UNSPECIFIED IRON DEFICIENCY ANEMIA TYPE: ICD-10-CM

## 2024-02-07 LAB
ALBUMIN SERPL-MCNC: 3.8 G/DL (ref 3.5–5.2)
ALBUMIN/GLOB SERPL: 1.2 G/DL
ALP SERPL-CCNC: 70 U/L (ref 39–117)
ALT SERPL W P-5'-P-CCNC: 47 U/L (ref 1–33)
ANION GAP SERPL CALCULATED.3IONS-SCNC: 16 MMOL/L (ref 5–15)
AST SERPL-CCNC: 33 U/L (ref 1–32)
BASOPHILS # BLD AUTO: 0.05 10*3/MM3 (ref 0–0.2)
BASOPHILS NFR BLD AUTO: 0.7 % (ref 0–1.5)
BILIRUB SERPL-MCNC: 0.2 MG/DL (ref 0–1.2)
BUN SERPL-MCNC: 17 MG/DL (ref 6–20)
BUN/CREAT SERPL: 24.3 (ref 7–25)
CALCIUM SPEC-SCNC: 8.9 MG/DL (ref 8.6–10.5)
CHLORIDE SERPL-SCNC: 105 MMOL/L (ref 98–107)
CO2 SERPL-SCNC: 19 MMOL/L (ref 22–29)
CREAT SERPL-MCNC: 0.7 MG/DL (ref 0.57–1)
DEPRECATED RDW RBC AUTO: 78.7 FL (ref 37–54)
EGFRCR SERPLBLD CKD-EPI 2021: 104.2 ML/MIN/1.73
EOSINOPHIL # BLD AUTO: 0.43 10*3/MM3 (ref 0–0.4)
EOSINOPHIL NFR BLD AUTO: 6.1 % (ref 0.3–6.2)
ERYTHROCYTE [DISTWIDTH] IN BLOOD BY AUTOMATED COUNT: 27 % (ref 12.3–15.4)
FERRITIN SERPL-MCNC: 72.89 NG/ML (ref 13–150)
GLOBULIN UR ELPH-MCNC: 3.2 GM/DL
GLUCOSE SERPL-MCNC: 107 MG/DL (ref 65–99)
HCT VFR BLD AUTO: 42.4 % (ref 34–46.6)
HGB BLD-MCNC: 12.8 G/DL (ref 12–15.9)
IRON 24H UR-MRATE: 60 MCG/DL (ref 37–145)
IRON SATN MFR SERPL: 14 % (ref 20–50)
LYMPHOCYTES # BLD AUTO: 2.39 10*3/MM3 (ref 0.7–3.1)
LYMPHOCYTES NFR BLD AUTO: 33.8 % (ref 19.6–45.3)
MCH RBC QN AUTO: 25.5 PG (ref 26.6–33)
MCHC RBC AUTO-ENTMCNC: 30.2 G/DL (ref 31.5–35.7)
MCV RBC AUTO: 84.5 FL (ref 79–97)
MONOCYTES # BLD AUTO: 0.48 10*3/MM3 (ref 0.1–0.9)
MONOCYTES NFR BLD AUTO: 6.8 % (ref 5–12)
NEUTROPHILS NFR BLD AUTO: 3.71 10*3/MM3 (ref 1.7–7)
NEUTROPHILS NFR BLD AUTO: 52.3 % (ref 42.7–76)
PLATELET # BLD AUTO: 239 10*3/MM3 (ref 140–450)
PMV BLD AUTO: ABNORMAL FL
POTASSIUM SERPL-SCNC: 3.6 MMOL/L (ref 3.5–5.2)
PROT SERPL-MCNC: 7 G/DL (ref 6–8.5)
RBC # BLD AUTO: 5.02 10*6/MM3 (ref 3.77–5.28)
SODIUM SERPL-SCNC: 140 MMOL/L (ref 136–145)
TIBC SERPL-MCNC: 423 MCG/DL (ref 298–536)
TRANSFERRIN SERPL-MCNC: 284 MG/DL (ref 200–360)
WBC NRBC COR # BLD AUTO: 7.08 10*3/MM3 (ref 3.4–10.8)

## 2024-02-07 PROCEDURE — 82728 ASSAY OF FERRITIN: CPT

## 2024-02-07 PROCEDURE — 83540 ASSAY OF IRON: CPT

## 2024-02-07 PROCEDURE — 85025 COMPLETE CBC W/AUTO DIFF WBC: CPT

## 2024-02-07 PROCEDURE — 80053 COMPREHEN METABOLIC PANEL: CPT

## 2024-02-07 PROCEDURE — 84466 ASSAY OF TRANSFERRIN: CPT

## 2024-02-07 PROCEDURE — 36415 COLL VENOUS BLD VENIPUNCTURE: CPT

## 2024-02-13 ENCOUNTER — TELEPHONE (OUTPATIENT)
Dept: ONCOLOGY | Facility: CLINIC | Age: 54
End: 2024-02-13
Payer: MEDICARE

## 2024-02-13 ENCOUNTER — TELEPHONE (OUTPATIENT)
Dept: ONCOLOGY | Facility: CLINIC | Age: 54
End: 2024-02-13

## 2024-02-13 NOTE — TELEPHONE ENCOUNTER
Caller: Yecenia Tabares    Relationship: Self    Best call back number: [phone]    What was the call regarding: PT WANTED TO KNOW IF SHE NEEDS MORE IRON     PLEASE CALL TO ADVISE

## 2024-03-20 ENCOUNTER — OFFICE VISIT (OUTPATIENT)
Dept: ONCOLOGY | Facility: CLINIC | Age: 54
End: 2024-03-20
Payer: MEDICARE

## 2024-03-20 ENCOUNTER — LAB (OUTPATIENT)
Dept: LAB | Facility: HOSPITAL | Age: 54
End: 2024-03-20
Payer: MEDICARE

## 2024-03-20 VITALS
TEMPERATURE: 97.4 F | HEART RATE: 94 BPM | OXYGEN SATURATION: 99 % | WEIGHT: 231.1 LBS | DIASTOLIC BLOOD PRESSURE: 82 MMHG | RESPIRATION RATE: 16 BRPM | HEIGHT: 63 IN | SYSTOLIC BLOOD PRESSURE: 118 MMHG | BODY MASS INDEX: 40.95 KG/M2

## 2024-03-20 DIAGNOSIS — Z79.01 ANTICOAGULANT LONG-TERM USE: ICD-10-CM

## 2024-03-20 DIAGNOSIS — D50.9 IRON DEFICIENCY ANEMIA, UNSPECIFIED IRON DEFICIENCY ANEMIA TYPE: Primary | ICD-10-CM

## 2024-03-20 DIAGNOSIS — E53.8 LOW SERUM VITAMIN B12: ICD-10-CM

## 2024-03-20 DIAGNOSIS — E53.8 FOLATE DEFICIENCY: ICD-10-CM

## 2024-03-20 DIAGNOSIS — Z86.718 HISTORY OF DVT (DEEP VEIN THROMBOSIS): ICD-10-CM

## 2024-03-20 DIAGNOSIS — Z86.718 HISTORY OF DVT (DEEP VEIN THROMBOSIS): Primary | ICD-10-CM

## 2024-03-20 LAB
ALBUMIN SERPL-MCNC: 4.3 G/DL (ref 3.5–5.2)
ALBUMIN/GLOB SERPL: 1.4 G/DL
ALP SERPL-CCNC: 74 U/L (ref 39–117)
ALT SERPL W P-5'-P-CCNC: 25 U/L (ref 1–33)
ANION GAP SERPL CALCULATED.3IONS-SCNC: 11 MMOL/L (ref 5–15)
AST SERPL-CCNC: 16 U/L (ref 1–32)
BASOPHILS # BLD AUTO: 0.04 10*3/MM3 (ref 0–0.2)
BASOPHILS NFR BLD AUTO: 0.7 % (ref 0–1.5)
BILIRUB SERPL-MCNC: 0.2 MG/DL (ref 0–1.2)
BUN SERPL-MCNC: 14 MG/DL (ref 6–20)
BUN/CREAT SERPL: 20.9 (ref 7–25)
CALCIUM SPEC-SCNC: 9.1 MG/DL (ref 8.6–10.5)
CHLORIDE SERPL-SCNC: 103 MMOL/L (ref 98–107)
CO2 SERPL-SCNC: 24 MMOL/L (ref 22–29)
CREAT SERPL-MCNC: 0.67 MG/DL (ref 0.57–1)
DEPRECATED RDW RBC AUTO: 59.2 FL (ref 37–54)
EGFRCR SERPLBLD CKD-EPI 2021: 105.3 ML/MIN/1.73
EOSINOPHIL # BLD AUTO: 0.42 10*3/MM3 (ref 0–0.4)
EOSINOPHIL NFR BLD AUTO: 6.9 % (ref 0.3–6.2)
ERYTHROCYTE [DISTWIDTH] IN BLOOD BY AUTOMATED COUNT: 18.5 % (ref 12.3–15.4)
FERRITIN SERPL-MCNC: 22.33 NG/ML (ref 13–150)
FOLATE SERPL-MCNC: 4.47 NG/ML (ref 4.78–24.2)
GLOBULIN UR ELPH-MCNC: 3.1 GM/DL
GLUCOSE SERPL-MCNC: 83 MG/DL (ref 65–99)
HCT VFR BLD AUTO: 45.6 % (ref 34–46.6)
HGB BLD-MCNC: 13.6 G/DL (ref 12–15.9)
HOLD SPECIMEN: NORMAL
IRON 24H UR-MRATE: 57 MCG/DL (ref 37–145)
IRON SATN MFR SERPL: 12 % (ref 20–50)
LYMPHOCYTES # BLD AUTO: 1.88 10*3/MM3 (ref 0.7–3.1)
LYMPHOCYTES NFR BLD AUTO: 31.1 % (ref 19.6–45.3)
MCH RBC QN AUTO: 26.5 PG (ref 26.6–33)
MCHC RBC AUTO-ENTMCNC: 29.8 G/DL (ref 31.5–35.7)
MCV RBC AUTO: 88.9 FL (ref 79–97)
MONOCYTES # BLD AUTO: 0.31 10*3/MM3 (ref 0.1–0.9)
MONOCYTES NFR BLD AUTO: 5.1 % (ref 5–12)
NEUTROPHILS NFR BLD AUTO: 3.38 10*3/MM3 (ref 1.7–7)
NEUTROPHILS NFR BLD AUTO: 55.9 % (ref 42.7–76)
PLATELET # BLD AUTO: 209 10*3/MM3 (ref 140–450)
POTASSIUM SERPL-SCNC: 4.4 MMOL/L (ref 3.5–5.2)
PROT SERPL-MCNC: 7.4 G/DL (ref 6–8.5)
RBC # BLD AUTO: 5.13 10*6/MM3 (ref 3.77–5.28)
SODIUM SERPL-SCNC: 138 MMOL/L (ref 136–145)
TIBC SERPL-MCNC: 481 MCG/DL (ref 298–536)
TRANSFERRIN SERPL-MCNC: 323 MG/DL (ref 200–360)
VIT B12 BLD-MCNC: 328 PG/ML (ref 211–946)
WBC NRBC COR # BLD AUTO: 6.05 10*3/MM3 (ref 3.4–10.8)

## 2024-03-20 PROCEDURE — 82607 VITAMIN B-12: CPT

## 2024-03-20 PROCEDURE — 82746 ASSAY OF FOLIC ACID SERUM: CPT

## 2024-03-20 PROCEDURE — 36415 COLL VENOUS BLD VENIPUNCTURE: CPT

## 2024-03-20 PROCEDURE — 84466 ASSAY OF TRANSFERRIN: CPT

## 2024-03-20 PROCEDURE — 82728 ASSAY OF FERRITIN: CPT

## 2024-03-20 PROCEDURE — 85025 COMPLETE CBC W/AUTO DIFF WBC: CPT

## 2024-03-20 PROCEDURE — 83540 ASSAY OF IRON: CPT

## 2024-03-20 PROCEDURE — 80053 COMPREHEN METABOLIC PANEL: CPT

## 2024-03-20 NOTE — PROGRESS NOTES
[facility] HEMATOLOGY & ONCOLOGY  [address]  [place] [id number] [phone]    Patient Name: Yecenia Tabares  Encounter Date: 03/20/2024   YOB: 1970  Patient Number: [id number]    Hematology / Oncology Progress Note      HPI / REASON FOR VISIT: Yecenia Tabares is a 53 y.o. female initially seen by Dr. Espana for iron deficiency, pulmonary embolism and left leg DVT.  The patient presented to the hospital on 12/20/2021 with shortness of breath and was found to have an elevated D-dimer along with low iron studies.  A CT chest angiogram on 12/20/2021 showed diffuse bilateral pulmonary emboli involving the distal main pulmonary arteries extending into the subsegmental branches.  Mild right ventricular heart strain with RV to LV ratio measuring 1.41.  Scattered groundglass opacities may represent multifocal pneumonia and/or ischemic changes of the parenchyma.  The patient was started on a heparin drip.     Of note, the patient had J&J vaccine in April 2021 and reported difficulty since that time with a right facial droop and difficulty with her speech since May or June of this year (!!!)  She and her sister state that she has been sick since then on and off without stop and symptoms started 2 weeks after the vaccine. Her sister also states that she had a seizure at that time. She does have a history of syncope when she was younger. PPM was checked recently and as per patient and sister was told that it was working fine     Lower extremity Dopplers done on 12/21/21 showed evidence of a DVT in the common femoral, profunda femoris, superficial femoral, popliteal, peroneal and posterior tibial veins in the left lower extremity.  There was no evidence of a DVT in the right side.  The patient also was noted to have superficial thrombus in the saphenous junction in the left lower extremity.  She stated that around this time, she was  "mostly in bed due to thinking she had pneumonia.      In addition to all this, the patient was noted to have a hemoglobin of 6.8 with an MCV of 72.9.  She was given 1 unit of packed red blood cells and also was given Venofer 200 mg for 2 days (as per patient had 2 iron infusions although not documented in the Epic chart). Patient has a history of gastric sleeve procedure and may not absorb oral iron and will likely need IV Venofer transfusions as an outpatient     The patient denied any history of malignancy or previous hypercoagulable state but did state that her father had malignancy with brain involvement as well as family member with breast cancer.  The patient is a smoker.     The patient was started on Lovenox and then transitioned to Eliquis and then changed to  Xarelto (due to insurance) on 2021. She states that she takes the medications as directed without missing a dose.      She denies family history of clotting but there are many family members and the patient have had miscarriages (she personally had 2   In the early part of the pregnancies)     She complains of pain in the right lower leg \"burning if I keep it straight but goes away when I straighten the leg\".      She no longer gets menstruation (has not had one for 7-8 years). She denies bleeding anywhere else.  She denies any signs of PICA syndrome.      The inheritable thrombophilia panel includes the followin.  APC resistance/factor V Leiden WNL  2.  Prothrombin gene mutation  3.  Protein C functional assay shows elevation in protein C antigen at 165 and elevation of protein C functional at 190 --elevations in protein C are usually not clinically significant but may indicate chronic inflammation or inflammatory disorder  4. protein S free protein and antigen assay shows elevation of free antigen at 145 but normal total antigen at 127 and functional protein S at 123 -- elevations in protein C are usually not clinically significant but " may indicate chronic inflammation or inflammatory disorder  5. Antithrombin III -elevated at 154 with a normal antigen at 110 -elevations in Antithrombin III do not correspond with clotting disorder but may theoretically correspond to a bleeding disorder which this patient does not appear to have  6.  PT/PTT/INR  7.  Anticardiolipin antibody WNL  8.  Beta-2 glycoprotein antibodies WNL  9.  Lupus anticoagulant POSITIVE -- if positive again in 12 weeks, would qualify patient for APLA Syndrome and would recommend change of anticoagulants to coumadin.  However, please note, patient is taking DOAC which can interfere and give a false positive lupus anticoagulant. Please note:  LAC can be FALSELY ELEVATED in patients on DOAC.  In an attempt to make sure that the LAC is not a false positive, in those patients taking a DOAC, can take off for 48 hours and recheck the LAC       She received Venofer 200 mg on November 22, December 4, 11 and 18, 2023 and tolerated the infusions well.       INTERVAL HISTORY     Pt presents to clinic today for follow up.    She had Venofer on Jan 5 and 18, 2024.    She continues to take Xarelto 20 mg daily w/o any s/s of bleeding or toxicity.   She complains of some persistent fatigue.    Complains that her left eye doesn't open well and she discussed it with Dr. Nash .  She is schduled to see neurology April 1     She had labs drawn and results were reviewed with her in office.        LABS    Lab Results - Last 18 Months   Lab Units 03/20/24  1102 02/07/24  1012 12/27/23  1145 11/22/23  0953 11/15/23  1329   HEMOGLOBIN g/dL 13.6 12.8 11.3* 9.3* 8.7*   HEMATOCRIT % 45.6 42.4 39.6 34.9 31.4*   MCV fL 88.9 84.5 77.5* 72.9* 72.2*   WBC 10*3/mm3 6.05 7.08 6.99 6.61 7.09   RDW % 18.5* 27.0* 30.2* 21.0* 21.1*   PLATELETS 10*3/mm3 209 239 234 305 339   NEUTROS ABS 10*3/mm3 3.38 3.71 3.70 3.83 2.98   LYMPHS ABS 10*3/mm3 1.88 2.39 2.31  --   --    MONOS ABS 10*3/mm3 0.31 0.48 0.39  --   --    EOS ABS  "10*3/mm3 0.42* 0.43* 0.53* 0.53* 0.78*   BASOS ABS 10*3/mm3 0.04 0.05 0.05 0.07 0.07   NRBC /100 WBC  --   --   --   --  1.0*   NEUTROPHIL % %  --   --   --  57.0 41.0*   MONOCYTES % %  --   --   --  4.0* 6.0   BASOPHIL % %  --   --   --  1.0 1.0   ATYP LYMPH % %  --   --   --  4.0 2.0   ANISOCYTOSIS   --   --   --  Slight/1+ Slight/1+       Lab Results - Last 18 Months   Lab Units 03/20/24  1102 02/07/24  1012 12/27/23  1145 11/15/23  1329   GLUCOSE mg/dL 83 107* 91 104*   SODIUM mmol/L 138 140 142 142   POTASSIUM mmol/L 4.4 3.6 3.6 4.0   CO2 mmol/L 24.0 19.0* 21.0* 24.0   CHLORIDE mmol/L 103 105 109* 108*   ANION GAP mmol/L 11.0 16.0* 12.0 10.0   CREATININE mg/dL 0.67 0.70 0.69 0.69   BUN mg/dL 14 17 10 14   BUN / CREAT RATIO  20.9 24.3 14.5 20.3   CALCIUM mg/dL 9.1 8.9 9.3 8.7   ALK PHOS U/L 74 70 63 73   TOTAL PROTEIN g/dL 7.4 7.0 7.0 6.6   ALT (SGPT) U/L 25 47* 9 10   AST (SGOT) U/L 16 33* 11 11   BILIRUBIN mg/dL 0.2 0.2 0.2 0.2   ALBUMIN g/dL 4.3 3.8 4.2 3.7   GLOBULIN gm/dL 3.1 3.2 2.8 2.9       No results for input(s): \"MSPIKE\", \"KAPPALAMB\", \"IGLFLC\", \"URICACID\", \"FREEKAPPAL\", \"CEA\", \"LDH\", \"REFLABREPO\" in the last 50179 hours.    Lab Results - Last 18 Months   Lab Units 03/20/24  1102 02/07/24  1012 12/27/23  1145 11/15/23  1329   IRON mcg/dL 57 60 49 16*   TIBC mcg/dL 481 423 428 475   IRON SATURATION (TSAT) % 12* 14* 11* 3*   FERRITIN ng/mL 22.33 72.89 139.20 10.09*   FOLATE ng/mL 4.47*  --   --  4.90         PAST MEDICAL HISTORY:  ALLERGIES:  Allergies   Allergen Reactions    Bee Venom Swelling    Erythromycin Shortness Of Breath    Penicillins Hives    Ampicillin Hives     CURRENT MEDICATIONS:  Outpatient Encounter Medications as of 3/20/2024   Medication Sig Dispense Refill    albuterol (PROVENTIL) (2.5 MG/3ML) 0.083% nebulizer solution Take 2.5 mg by nebulization Every 4 (Four) Hours As Needed for Wheezing.      albuterol sulfate  (90 Base) MCG/ACT inhaler Inhale 2 puffs Every 4 (Four) Hours As " "Needed for Wheezing.      Atogepant (Qulipta) 60 MG tablet Take 1 tablet by mouth Daily. 30 tablet 5    butalbital-acetaminophen-caffeine (FIORICET, ESGIC) -40 MG per tablet Take 1 tablet by mouth Daily As Needed for Headache.      DULoxetine (CYMBALTA) 60 MG capsule Take 90 mg by mouth Every Night. Pt takes a 30 mg and a 60 mg for a total of 90 mg      escitalopram (LEXAPRO) 10 MG tablet Take 1 tablet by mouth Daily.      gabapentin (NEURONTIN) 300 MG capsule Take 1 capsule by mouth 3 (Three) Times a Day.      meclizine (ANTIVERT) 25 MG tablet Take 1 tablet by mouth 3 (Three) Times a Day As Needed for Dizziness.      Needle, Disp, 25G X 1\" misc 10 each Daily. 2 each 0    oxyCODONE-acetaminophen (PERCOCET) 7.5-325 MG per tablet Take 1 tablet by mouth 3 (Three) Times a Day As Needed.      pantoprazole (PROTONIX) 40 MG EC tablet Take 1 tablet by mouth 2 (Two) Times a Day.      promethazine (PHENERGAN) 25 MG tablet Take 0.5 tablets by mouth Every 6 (Six) Hours As Needed for Nausea or Vomiting.      Rimegepant Sulfate (Nurtec) 75 MG tablet dispersible tablet Take 1 tablet by mouth 1 (One) Time As Needed (migraine). 16 tablet 5    rivaroxaban (XARELTO) 20 MG tablet Take 1 tablet by mouth Daily.      rOPINIRole (REQUIP) 1 MG tablet Take 1 tablet by mouth 2 (Two) Times a Day. Take 1 hour before bedtime.       No facility-administered encounter medications on file as of 3/20/2024.     ADULT ILLNESSES:  Patient Active Problem List   Diagnosis Code    Pulmonary embolism, bilateral I26.99    Microcytic anemia D50.9    Tobacco abuse Z72.0    Chronic back pain M54.9, G89.29    Obesity, Class III, BMI 40-49.9 (morbid obesity) E66.01    Acute deep vein thrombosis (DVT) of left lower extremity I82.402    Iron deficiency anemia D50.9    B12 deficiency E53.8     SURGERIES:  Past Surgical History:   Procedure Laterality Date    BREAST SURGERY      reduction and lift    CHOLECYSTECTOMY      DILATATION AND CURETTAGE      GASTRIC " SLEEVE LAPAROSCOPIC  2016    INSERT / REPLACE / REMOVE PACEMAKER      at 23 years old due to syncope    PACEMAKER IMPLANTATION      REDUCTION MAMMAPLASTY       HEALTH MAINTENANCE ITEMS:  Health Maintenance Due   Topic Date Due    BMI FOLLOWUP  Never done    Pneumococcal Vaccine 0-64 (1 of 2 - PCV) Never done    ZOSTER VACCINE (1 of 2) Never done    HEPATITIS C SCREENING  Never done    ANNUAL WELLNESS VISIT  Never done    PAP SMEAR  Never done    LUNG CANCER SCREENING  12/20/2022    COLORECTAL CANCER SCREENING  12/20/2022    COVID-19 Vaccine (2 - 2023-24 season) 09/01/2023    MAMMOGRAM  02/14/2024       <no information>  Last Completed Colonoscopy       This patient has no relevant Health Maintenance data.          Immunization History   Administered Date(s) Administered    COVID-19 ([employer]) 04/07/2021     Last Completed Mammogram       This patient has no relevant Health Maintenance data.              FAMILY HISTORY:  Family History   Problem Relation Age of Onset    Stroke Mother     Kidney failure Mother     Diabetes Mother     Miscarriages / Stillbirths Mother         x2    Cancer Father 60        brain    Diabetes Sister     Hypertension Sister     Kidney disease Brother     Depression Brother     Miscarriages / Stillbirths Maternal Grandmother         x3    Heart attack Sister     Transient ischemic attack Sister         non-hemorrhagoic    Multiple sclerosis Sister     Miscarriages / Stillbirths Sister         x1    Diabetes Sister     Obesity Sister     Diabetes Sister     Stroke Sister     Diabetes Brother     Obesity Brother     Gout Brother     Breast cancer Maternal Aunt      SOCIAL HISTORY:  Social History     Socioeconomic History    Marital status:    Tobacco Use    Smoking status: Every Day     Current packs/day: 1.00     Average packs/day: 1 pack/day for 30.0 years (30.0 ttl pk-yrs)     Types: Cigarettes     Passive exposure: Never    Smokeless tobacco: Never   Vaping Use    Vaping status:  "Never Used   Substance and Sexual Activity    Alcohol use: No    Drug use: No    Sexual activity: Defer       REVIEW OF SYSTEMS:  Review of Systems   Constitutional:  Positive for fatigue. Negative for fever.   HENT:  Negative for trouble swallowing.    Respiratory:  Positive for shortness of breath. Negative for cough.    Cardiovascular:  Negative for chest pain, palpitations and leg swelling.        Pt has pacemaker   Gastrointestinal:  Negative for nausea and vomiting.   Genitourinary:  Negative for hematuria.   Musculoskeletal:  Negative for arthralgias and myalgias.   Skin:  Negative for rash, skin lesions and wound.   Neurological:  Negative for dizziness, syncope, memory problem and confusion.   Psychiatric/Behavioral:  Negative for suicidal ideas and depressed mood. The patient is not nervous/anxious.        /82   Pulse 94   Temp 97.4 °F (36.3 °C)   Resp 16   Ht 160 cm (63\")   Wt 105 kg (231 lb 1.6 oz)   SpO2 99%   BMI 40.94 kg/m²  Body surface area is 2.06 meters squared.    Pain Score    03/20/24 1111   PainSc:   7   PainLoc: Generalized          Physical Exam  Constitutional:       Appearance: Normal appearance.   HENT:      Head: Normocephalic and atraumatic.   Cardiovascular:      Rate and Rhythm: Normal rate and regular rhythm.   Pulmonary:      Effort: Pulmonary effort is normal.      Breath sounds: Normal breath sounds.   Abdominal:      General: Bowel sounds are normal.      Palpations: Abdomen is soft.   Musculoskeletal:      Right lower leg: No edema.      Left lower leg: No edema.   Skin:     General: Skin is warm and dry.   Neurological:      Mental Status: She is alert and oriented to person, place, and time.   Psychiatric:         Attention and Perception: Attention normal.         Mood and Affect: Mood normal.         Judgment: Judgment normal.         Yecenia Tabares reports a pain score of 7.  Given her pain assessment as noted, treatment options were discussed and the " following options were decided upon as a follow-up plan to address the patient's pain: continuation of current treatment plan for pain and pt takes gabapentin, percocet and Fioricet .           ASSESSMENT / PLAN    1. Iron deficiency anemia, unspecified iron deficiency anemia type    2. Low serum vitamin B12    3. History of DVT (deep vein thrombosis)    4. Anticoagulant long-term use    5. Folate deficiency             ASSESSMENT:     Iron Deficiency   -Pt received Venofer 200 mg on November 22, December 4, 11 and 18, 2023  -She had Venofer 200 on Jan 5 and 18, 2024.  -Hgb 13.6, Hct 45.6, Ferritin 22, Iron 57, Sat 12%, TIBC 481  -Will order Venofer 200 mg IV weekly x 4   -Has seen gastro in [place].  Needs clearance from cardiology for Colonoscopy and EGD  -States Dr. Nash has referred her to cardiology       Low B12   -States no longer getting B12 injections   -Lab today pending.     History of DVT  Long term Anticoagulation   -Pt is taking Xarelto 20 mg PO daily   -Pt denies s/s of bleeding or toxicity   -Samples given today    5.  Folate Deficiency   -Folate today:  4.47  -Will start Folic Acid 1mg PO daily     PLAN:  Venofer 200 mg IV weekly x 4  Will start Folic Acid 1 mg PO daily   Labs only in 6 weeks  RTC in 3 months.    Patient will have preoffice labs.    Orders have been signed.    Continue current medications, treatment plans and follow up with PCP and any other providers.  Care discussed with patient.  Understanding expressed.  Patient agreeable with plan.        ACP discussion was held with the patient during this visit. Patient has an advance directive in EMR which is still valid.           Linh Lau, HELADIO  03/20/2024

## 2024-03-22 RX ORDER — FOLIC ACID 1 MG/1
1 TABLET ORAL DAILY
Qty: 30 TABLET | Refills: 2 | Status: SHIPPED | OUTPATIENT
Start: 2024-03-22

## 2024-03-26 ENCOUNTER — INFUSION (OUTPATIENT)
Dept: ONCOLOGY | Facility: HOSPITAL | Age: 54
End: 2024-03-26
Payer: MEDICARE

## 2024-03-26 VITALS
HEART RATE: 86 BPM | BODY MASS INDEX: 40.94 KG/M2 | HEIGHT: 63 IN | DIASTOLIC BLOOD PRESSURE: 80 MMHG | RESPIRATION RATE: 18 BRPM | OXYGEN SATURATION: 98 % | TEMPERATURE: 98.4 F | SYSTOLIC BLOOD PRESSURE: 113 MMHG

## 2024-03-26 DIAGNOSIS — D50.9 IRON DEFICIENCY ANEMIA, UNSPECIFIED IRON DEFICIENCY ANEMIA TYPE: Primary | ICD-10-CM

## 2024-03-26 PROCEDURE — 25810000003 SODIUM CHLORIDE 0.9 % SOLUTION: Performed by: NURSE PRACTITIONER

## 2024-03-26 PROCEDURE — A9270 NON-COVERED ITEM OR SERVICE: HCPCS | Performed by: NURSE PRACTITIONER

## 2024-03-26 PROCEDURE — 96365 THER/PROPH/DIAG IV INF INIT: CPT

## 2024-03-26 PROCEDURE — 63710000001 ACETAMINOPHEN 325 MG TABLET: Performed by: NURSE PRACTITIONER

## 2024-03-26 PROCEDURE — 25010000002 IRON SUCROSE PER 1 MG: Performed by: NURSE PRACTITIONER

## 2024-03-26 PROCEDURE — 96374 THER/PROPH/DIAG INJ IV PUSH: CPT

## 2024-03-26 PROCEDURE — 63710000001 DIPHENHYDRAMINE PER 50 MG: Performed by: NURSE PRACTITIONER

## 2024-03-26 RX ORDER — FAMOTIDINE 10 MG/ML
20 INJECTION, SOLUTION INTRAVENOUS AS NEEDED
Status: DISCONTINUED | OUTPATIENT
Start: 2024-03-26 | End: 2024-03-26 | Stop reason: HOSPADM

## 2024-03-26 RX ORDER — SODIUM CHLORIDE 9 MG/ML
20 INJECTION, SOLUTION INTRAVENOUS ONCE
Status: COMPLETED | OUTPATIENT
Start: 2024-03-26 | End: 2024-03-26

## 2024-03-26 RX ORDER — ACETAMINOPHEN 325 MG/1
650 TABLET ORAL ONCE
OUTPATIENT
Start: 2024-04-16

## 2024-03-26 RX ORDER — DIPHENHYDRAMINE HCL 25 MG
25 CAPSULE ORAL ONCE
OUTPATIENT
Start: 2024-04-16

## 2024-03-26 RX ORDER — FAMOTIDINE 10 MG/ML
20 INJECTION, SOLUTION INTRAVENOUS AS NEEDED
OUTPATIENT
Start: 2024-04-16

## 2024-03-26 RX ORDER — SODIUM CHLORIDE 9 MG/ML
20 INJECTION, SOLUTION INTRAVENOUS ONCE
OUTPATIENT
Start: 2024-04-09

## 2024-03-26 RX ORDER — FAMOTIDINE 10 MG/ML
20 INJECTION, SOLUTION INTRAVENOUS AS NEEDED
OUTPATIENT
Start: 2024-04-02

## 2024-03-26 RX ORDER — SODIUM CHLORIDE 9 MG/ML
20 INJECTION, SOLUTION INTRAVENOUS ONCE
OUTPATIENT
Start: 2024-04-02

## 2024-03-26 RX ORDER — DIPHENHYDRAMINE HCL 25 MG
25 CAPSULE ORAL ONCE
OUTPATIENT
Start: 2024-04-02

## 2024-03-26 RX ORDER — SODIUM CHLORIDE 9 MG/ML
20 INJECTION, SOLUTION INTRAVENOUS ONCE
OUTPATIENT
Start: 2024-04-16

## 2024-03-26 RX ORDER — DIPHENHYDRAMINE HYDROCHLORIDE 50 MG/ML
50 INJECTION INTRAMUSCULAR; INTRAVENOUS AS NEEDED
OUTPATIENT
Start: 2024-04-02

## 2024-03-26 RX ORDER — DIPHENHYDRAMINE HYDROCHLORIDE 50 MG/ML
50 INJECTION INTRAMUSCULAR; INTRAVENOUS AS NEEDED
OUTPATIENT
Start: 2024-04-16

## 2024-03-26 RX ORDER — DIPHENHYDRAMINE HYDROCHLORIDE 50 MG/ML
50 INJECTION INTRAMUSCULAR; INTRAVENOUS AS NEEDED
OUTPATIENT
Start: 2024-04-09

## 2024-03-26 RX ORDER — DIPHENHYDRAMINE HCL 25 MG
25 CAPSULE ORAL ONCE
OUTPATIENT
Start: 2024-04-09

## 2024-03-26 RX ORDER — DIPHENHYDRAMINE HCL 25 MG
25 CAPSULE ORAL ONCE
Status: COMPLETED | OUTPATIENT
Start: 2024-03-26 | End: 2024-03-26

## 2024-03-26 RX ORDER — ACETAMINOPHEN 325 MG/1
650 TABLET ORAL ONCE
OUTPATIENT
Start: 2024-04-02

## 2024-03-26 RX ORDER — ACETAMINOPHEN 325 MG/1
650 TABLET ORAL ONCE
OUTPATIENT
Start: 2024-04-09

## 2024-03-26 RX ORDER — DIPHENHYDRAMINE HYDROCHLORIDE 50 MG/ML
50 INJECTION INTRAMUSCULAR; INTRAVENOUS AS NEEDED
Status: DISCONTINUED | OUTPATIENT
Start: 2024-03-26 | End: 2024-03-26 | Stop reason: HOSPADM

## 2024-03-26 RX ORDER — ACETAMINOPHEN 325 MG/1
650 TABLET ORAL ONCE
Status: COMPLETED | OUTPATIENT
Start: 2024-03-26 | End: 2024-03-26

## 2024-03-26 RX ORDER — FAMOTIDINE 10 MG/ML
20 INJECTION, SOLUTION INTRAVENOUS AS NEEDED
OUTPATIENT
Start: 2024-04-09

## 2024-03-26 RX ADMIN — SODIUM CHLORIDE 20 ML/HR: 9 INJECTION, SOLUTION INTRAVENOUS at 13:52

## 2024-03-26 RX ADMIN — DIPHENHYDRAMINE HYDROCHLORIDE 25 MG: 25 CAPSULE ORAL at 14:07

## 2024-03-26 RX ADMIN — ACETAMINOPHEN 650 MG: 325 TABLET ORAL at 14:07

## 2024-03-26 RX ADMIN — IRON SUCROSE 200 MG: 20 INJECTION, SOLUTION INTRAVENOUS at 14:09

## 2024-04-09 ENCOUNTER — INFUSION (OUTPATIENT)
Dept: ONCOLOGY | Facility: HOSPITAL | Age: 54
End: 2024-04-09
Payer: MEDICARE

## 2024-04-09 VITALS
SYSTOLIC BLOOD PRESSURE: 111 MMHG | TEMPERATURE: 97.7 F | HEART RATE: 85 BPM | OXYGEN SATURATION: 97 % | RESPIRATION RATE: 17 BRPM | DIASTOLIC BLOOD PRESSURE: 72 MMHG

## 2024-04-09 DIAGNOSIS — D50.9 IRON DEFICIENCY ANEMIA, UNSPECIFIED IRON DEFICIENCY ANEMIA TYPE: Primary | ICD-10-CM

## 2024-04-09 PROCEDURE — 63710000001 ACETAMINOPHEN 325 MG TABLET: Performed by: NURSE PRACTITIONER

## 2024-04-09 PROCEDURE — 63710000001 DIPHENHYDRAMINE PER 50 MG: Performed by: NURSE PRACTITIONER

## 2024-04-09 PROCEDURE — 96374 THER/PROPH/DIAG INJ IV PUSH: CPT

## 2024-04-09 PROCEDURE — A9270 NON-COVERED ITEM OR SERVICE: HCPCS | Performed by: NURSE PRACTITIONER

## 2024-04-09 PROCEDURE — 25010000002 IRON SUCROSE PER 1 MG: Performed by: NURSE PRACTITIONER

## 2024-04-09 PROCEDURE — 96365 THER/PROPH/DIAG IV INF INIT: CPT

## 2024-04-09 PROCEDURE — 25810000003 SODIUM CHLORIDE 0.9 % SOLUTION: Performed by: NURSE PRACTITIONER

## 2024-04-09 RX ORDER — SODIUM CHLORIDE 9 MG/ML
20 INJECTION, SOLUTION INTRAVENOUS ONCE
Status: COMPLETED | OUTPATIENT
Start: 2024-04-09 | End: 2024-04-09

## 2024-04-09 RX ORDER — ACETAMINOPHEN 325 MG/1
650 TABLET ORAL ONCE
Status: COMPLETED | OUTPATIENT
Start: 2024-04-09 | End: 2024-04-09

## 2024-04-09 RX ORDER — DIPHENHYDRAMINE HYDROCHLORIDE 50 MG/ML
50 INJECTION INTRAMUSCULAR; INTRAVENOUS AS NEEDED
Status: DISCONTINUED | OUTPATIENT
Start: 2024-04-09 | End: 2024-04-09 | Stop reason: HOSPADM

## 2024-04-09 RX ORDER — DIPHENHYDRAMINE HCL 25 MG
25 CAPSULE ORAL ONCE
Status: COMPLETED | OUTPATIENT
Start: 2024-04-09 | End: 2024-04-09

## 2024-04-09 RX ORDER — FAMOTIDINE 10 MG/ML
20 INJECTION, SOLUTION INTRAVENOUS AS NEEDED
Status: DISCONTINUED | OUTPATIENT
Start: 2024-04-09 | End: 2024-04-09 | Stop reason: HOSPADM

## 2024-04-09 RX ADMIN — SODIUM CHLORIDE 20 ML/HR: 9 INJECTION, SOLUTION INTRAVENOUS at 13:05

## 2024-04-09 RX ADMIN — IRON SUCROSE 200 MG: 20 INJECTION, SOLUTION INTRAVENOUS at 13:06

## 2024-04-09 RX ADMIN — ACETAMINOPHEN 650 MG: 325 TABLET, FILM COATED ORAL at 13:05

## 2024-04-09 RX ADMIN — DIPHENHYDRAMINE HYDROCHLORIDE 25 MG: 25 CAPSULE ORAL at 13:05

## 2024-05-16 ENCOUNTER — OFFICE VISIT (OUTPATIENT)
Dept: NEUROLOGY | Facility: CLINIC | Age: 54
End: 2024-05-16
Payer: MEDICARE

## 2024-05-16 VITALS
SYSTOLIC BLOOD PRESSURE: 100 MMHG | WEIGHT: 230 LBS | HEIGHT: 63 IN | DIASTOLIC BLOOD PRESSURE: 84 MMHG | BODY MASS INDEX: 40.75 KG/M2 | OXYGEN SATURATION: 96 % | HEART RATE: 93 BPM

## 2024-05-16 DIAGNOSIS — G43.719 INTRACTABLE CHRONIC MIGRAINE WITHOUT AURA AND WITHOUT STATUS MIGRAINOSUS: ICD-10-CM

## 2024-05-16 PROCEDURE — 1160F RVW MEDS BY RX/DR IN RCRD: CPT | Performed by: PSYCHIATRY & NEUROLOGY

## 2024-05-16 PROCEDURE — 1159F MED LIST DOCD IN RCRD: CPT | Performed by: PSYCHIATRY & NEUROLOGY

## 2024-05-16 PROCEDURE — 99214 OFFICE O/P EST MOD 30 MIN: CPT | Performed by: PSYCHIATRY & NEUROLOGY

## 2024-05-16 RX ORDER — ATOGEPANT 60 MG/1
60 TABLET ORAL DAILY
Qty: 30 TABLET | Refills: 11 | Status: SHIPPED | OUTPATIENT
Start: 2024-05-16

## 2024-05-16 RX ORDER — UBROGEPANT 100 MG/1
100 TABLET ORAL AS NEEDED
COMMUNITY
Start: 2024-04-29

## 2024-05-16 RX ORDER — IPRATROPIUM BROMIDE AND ALBUTEROL SULFATE 2.5; .5 MG/3ML; MG/3ML
SOLUTION RESPIRATORY (INHALATION)
COMMUNITY
Start: 2024-04-26

## 2024-05-16 NOTE — PROGRESS NOTES
"  Tammy Tabares presents to [facility] Neurology    History of Present Illness  53-year-old female here for follow-up.  Taking Qulipta.  Ordered Nurtec but was told there was an interaction so was given Ubrelvy instead.  No benefit with Ubrelvy.  Does think the Qulipta is helping to some degree.  Only having 1 bad migraine a month that can last a few days.  Will go to her PCP to get an injection.              Current Outpatient Medications:     albuterol (PROVENTIL) (2.5 MG/3ML) 0.083% nebulizer solution, Take 2.5 mg by nebulization Every 4 (Four) Hours As Needed for Wheezing., Disp: , Rfl:     albuterol sulfate  (90 Base) MCG/ACT inhaler, Inhale 2 puffs Every 4 (Four) Hours As Needed for Wheezing., Disp: , Rfl:     Atogepant (Qulipta) 60 MG tablet, Take 1 tablet by mouth Daily., Disp: 30 tablet, Rfl: 5    butalbital-acetaminophen-caffeine (FIORICET, ESGIC) -40 MG per tablet, Take 1 tablet by mouth Daily As Needed for Headache., Disp: , Rfl:     DULoxetine (CYMBALTA) 60 MG capsule, Take 90 mg by mouth Every Night. Pt takes a 30 mg and a 60 mg for a total of 90 mg, Disp: , Rfl:     escitalopram (LEXAPRO) 10 MG tablet, Take 1 tablet by mouth Daily., Disp: , Rfl:     folic acid (FOLVITE) 1 MG tablet, Take 1 tablet by mouth Daily., Disp: 30 tablet, Rfl: 2    gabapentin (NEURONTIN) 300 MG capsule, Take 1 capsule by mouth 3 (Three) Times a Day., Disp: , Rfl:     ipratropium-albuterol (DUO-NEB) 0.5-2.5 mg/3 ml nebulizer, , Disp: , Rfl:     meclizine (ANTIVERT) 25 MG tablet, Take 1 tablet by mouth 3 (Three) Times a Day As Needed for Dizziness., Disp: , Rfl:     Needle, Disp, 25G X 1\" misc, 10 each Daily., Disp: 2 each, Rfl: 0    oxyCODONE-acetaminophen (PERCOCET) 7.5-325 MG per tablet, Take 1 tablet by mouth 3 (Three) Times a Day As Needed., Disp: , Rfl:     pantoprazole (PROTONIX) 40 MG EC tablet, Take 1 tablet by mouth 2 (Two) Times a Day., Disp: , Rfl:     " "promethazine (PHENERGAN) 25 MG tablet, Take 0.5 tablets by mouth Every 6 (Six) Hours As Needed for Nausea or Vomiting., Disp: , Rfl:     rivaroxaban (XARELTO) 20 MG tablet, Take 1 tablet by mouth Daily., Disp: , Rfl:     rOPINIRole (REQUIP) 1 MG tablet, Take 1 tablet by mouth 2 (Two) Times a Day. Take 1 hour before bedtime., Disp: , Rfl:     Ubrelvy 100 MG tablet, Take 1 tablet by mouth As Needed (migraine)., Disp: , Rfl:        Objective   Vital Signs:   /84 (BP Location: Left arm, Patient Position: Sitting, Cuff Size: Large Adult)   Pulse 93   Ht 160 cm (63\")   Wt 104 kg (230 lb)   SpO2 96%   BMI 40.74 kg/m²     Physical Exam  Constitutional:       General: She is awake.   Eyes:      Extraocular Movements: Extraocular movements intact.   Neurological:      Mental Status: She is alert.   Psychiatric:         Speech: Speech normal.      Neurological Exam  Mental Status  Awake and alert. Speech is normal. Language is fluent with no aphasia.    Cranial Nerves  CN III, IV, VI: Extraocular movements intact bilaterally.  CN VII: Full and symmetric facial movement.    Gait  Casual gait is normal including stance, stride, and arm swing.      Result Review :                     Assessment and Plan   53-year-old female with history of DVT, currently on Xarelto, depression, referred for evaluation of chronic migraines. She also has several other reports of possible seizures and TIAs, although this diagnosis is not clear, but because of her having focal neurologic symptoms, triptans would be contraindicated. She has already failed topiramate. She is currently on duloxetine, gabapentin. She has asthma, so propranolol would be contraindicated.  Some benefit with Qulipta.  Potentially some medication overuse headache as well.  She also takes oxycodone chronically for chronic pain, which may be playing a role. The patient has known DESMOND and is not currently on CPAP, which may be worsening her headaches as well. "     Plan:    1.  Continue Qulipta 60 mg daily.   2. Follow-up 1 year.      Follow Up   No follow-ups on file.  Patient was given instructions and counseling regarding her condition or for health maintenance advice. Please see specific information pulled into the AVS if appropriate.

## 2024-06-12 ENCOUNTER — TELEPHONE (OUTPATIENT)
Dept: ONCOLOGY | Facility: CLINIC | Age: 54
End: 2024-06-12

## 2024-06-12 NOTE — TELEPHONE ENCOUNTER
Caller: Yecenia Tabares    Relationship to patient: Self    Best call back number: [phone]    Type of visit: LAB    Requested date: CALL PATIENT TO R/S    If rescheduling, when is the original appointment: 6/13

## 2024-06-14 ENCOUNTER — LAB (OUTPATIENT)
Dept: LAB | Facility: HOSPITAL | Age: 54
End: 2024-06-14
Payer: MEDICARE

## 2024-06-14 DIAGNOSIS — D50.9 IRON DEFICIENCY ANEMIA, UNSPECIFIED IRON DEFICIENCY ANEMIA TYPE: ICD-10-CM

## 2024-06-14 DIAGNOSIS — E53.8 FOLATE DEFICIENCY: ICD-10-CM

## 2024-06-14 DIAGNOSIS — E53.8 LOW SERUM VITAMIN B12: ICD-10-CM

## 2024-06-14 LAB
ALBUMIN SERPL-MCNC: 4.3 G/DL (ref 3.5–5.2)
ALBUMIN/GLOB SERPL: 1.3 G/DL
ALP SERPL-CCNC: 84 U/L (ref 39–117)
ALT SERPL W P-5'-P-CCNC: 29 U/L (ref 1–33)
ANION GAP SERPL CALCULATED.3IONS-SCNC: 16 MMOL/L (ref 5–15)
AST SERPL-CCNC: 14 U/L (ref 1–32)
BASOPHILS # BLD AUTO: 0.04 10*3/MM3 (ref 0–0.2)
BASOPHILS NFR BLD AUTO: 0.5 % (ref 0–1.5)
BILIRUB SERPL-MCNC: 0.3 MG/DL (ref 0–1.2)
BUN SERPL-MCNC: 17 MG/DL (ref 6–20)
BUN/CREAT SERPL: 22.7 (ref 7–25)
CALCIUM SPEC-SCNC: 9.2 MG/DL (ref 8.6–10.5)
CHLORIDE SERPL-SCNC: 100 MMOL/L (ref 98–107)
CO2 SERPL-SCNC: 22 MMOL/L (ref 22–29)
CREAT SERPL-MCNC: 0.75 MG/DL (ref 0.57–1)
DEPRECATED RDW RBC AUTO: 51.6 FL (ref 37–54)
EGFRCR SERPLBLD CKD-EPI 2021: 95.9 ML/MIN/1.73
EOSINOPHIL # BLD AUTO: 0.36 10*3/MM3 (ref 0–0.4)
EOSINOPHIL NFR BLD AUTO: 4.3 % (ref 0.3–6.2)
ERYTHROCYTE [DISTWIDTH] IN BLOOD BY AUTOMATED COUNT: 15.5 % (ref 12.3–15.4)
FERRITIN SERPL-MCNC: 44.06 NG/ML (ref 13–150)
FOLATE SERPL-MCNC: >20 NG/ML (ref 4.78–24.2)
GLOBULIN UR ELPH-MCNC: 3.2 GM/DL
GLUCOSE SERPL-MCNC: 100 MG/DL (ref 65–99)
HCT VFR BLD AUTO: 46.3 % (ref 34–46.6)
HGB BLD-MCNC: 14.3 G/DL (ref 12–15.9)
IMM GRANULOCYTES # BLD AUTO: 0.03 10*3/MM3 (ref 0–0.05)
IMM GRANULOCYTES NFR BLD AUTO: 0.4 % (ref 0–0.5)
IRON 24H UR-MRATE: 92 MCG/DL (ref 37–145)
IRON SATN MFR SERPL: 18 % (ref 20–50)
LYMPHOCYTES # BLD AUTO: 2.06 10*3/MM3 (ref 0.7–3.1)
LYMPHOCYTES NFR BLD AUTO: 24.7 % (ref 19.6–45.3)
MCH RBC QN AUTO: 28.1 PG (ref 26.6–33)
MCHC RBC AUTO-ENTMCNC: 30.9 G/DL (ref 31.5–35.7)
MCV RBC AUTO: 91.1 FL (ref 79–97)
MONOCYTES # BLD AUTO: 0.52 10*3/MM3 (ref 0.1–0.9)
MONOCYTES NFR BLD AUTO: 6.2 % (ref 5–12)
NEUTROPHILS NFR BLD AUTO: 5.33 10*3/MM3 (ref 1.7–7)
NEUTROPHILS NFR BLD AUTO: 63.9 % (ref 42.7–76)
PLATELET # BLD AUTO: 217 10*3/MM3 (ref 140–450)
PMV BLD AUTO: 14.8 FL (ref 6–12)
POTASSIUM SERPL-SCNC: 4.1 MMOL/L (ref 3.5–5.2)
PROT SERPL-MCNC: 7.5 G/DL (ref 6–8.5)
RBC # BLD AUTO: 5.08 10*6/MM3 (ref 3.77–5.28)
SODIUM SERPL-SCNC: 138 MMOL/L (ref 136–145)
TIBC SERPL-MCNC: 510 MCG/DL (ref 298–536)
TRANSFERRIN SERPL-MCNC: 342 MG/DL (ref 200–360)
VIT B12 BLD-MCNC: 257 PG/ML (ref 211–946)
WBC NRBC COR # BLD AUTO: 8.34 10*3/MM3 (ref 3.4–10.8)

## 2024-06-14 PROCEDURE — 82746 ASSAY OF FOLIC ACID SERUM: CPT

## 2024-06-14 PROCEDURE — 36415 COLL VENOUS BLD VENIPUNCTURE: CPT

## 2024-06-14 PROCEDURE — 82607 VITAMIN B-12: CPT

## 2024-06-14 PROCEDURE — 83540 ASSAY OF IRON: CPT

## 2024-06-14 PROCEDURE — 82728 ASSAY OF FERRITIN: CPT

## 2024-06-14 PROCEDURE — 85025 COMPLETE CBC W/AUTO DIFF WBC: CPT

## 2024-06-14 PROCEDURE — 80053 COMPREHEN METABOLIC PANEL: CPT

## 2024-06-14 PROCEDURE — 84466 ASSAY OF TRANSFERRIN: CPT

## 2024-06-19 ENCOUNTER — TELEPHONE (OUTPATIENT)
Dept: ONCOLOGY | Facility: CLINIC | Age: 54
End: 2024-06-19

## 2024-06-19 NOTE — TELEPHONE ENCOUNTER
Caller: Yecenia Tabares    Relationship to patient: Self    Best call back number: [phone]    Chief complaint: R/S    Type of visit: FOLLOW UP 1    Requested date: 7-3-2024 MORNING     If rescheduling, when is the original appointment: 6-

## 2024-06-20 DIAGNOSIS — E53.8 FOLATE DEFICIENCY: ICD-10-CM

## 2024-06-20 RX ORDER — FOLIC ACID 1 MG/1
1 TABLET ORAL DAILY
Qty: 30 TABLET | Refills: 2 | Status: SHIPPED | OUTPATIENT
Start: 2024-06-20

## 2024-07-03 ENCOUNTER — OFFICE VISIT (OUTPATIENT)
Dept: ONCOLOGY | Facility: CLINIC | Age: 54
End: 2024-07-03
Payer: MEDICARE

## 2024-07-03 ENCOUNTER — PATIENT MESSAGE (OUTPATIENT)
Dept: ONCOLOGY | Facility: CLINIC | Age: 54
End: 2024-07-03
Payer: MEDICARE

## 2024-07-03 VITALS
RESPIRATION RATE: 16 BRPM | BODY MASS INDEX: 40.17 KG/M2 | OXYGEN SATURATION: 97 % | DIASTOLIC BLOOD PRESSURE: 82 MMHG | TEMPERATURE: 97.6 F | HEIGHT: 63 IN | HEART RATE: 67 BPM | SYSTOLIC BLOOD PRESSURE: 122 MMHG | WEIGHT: 226.7 LBS

## 2024-07-03 DIAGNOSIS — Z86.718 HISTORY OF DVT (DEEP VEIN THROMBOSIS): ICD-10-CM

## 2024-07-03 DIAGNOSIS — E53.8 FOLATE DEFICIENCY: ICD-10-CM

## 2024-07-03 DIAGNOSIS — E53.8 LOW SERUM VITAMIN B12: ICD-10-CM

## 2024-07-03 DIAGNOSIS — D50.9 IRON DEFICIENCY ANEMIA, UNSPECIFIED IRON DEFICIENCY ANEMIA TYPE: Primary | ICD-10-CM

## 2024-07-03 DIAGNOSIS — Z79.01 ANTICOAGULANT LONG-TERM USE: ICD-10-CM

## 2024-07-03 NOTE — PROGRESS NOTES
[facility] HEMATOLOGY & ONCOLOGY  [address]  [place] [id number] [phone]    Patient Name: Yecenia Tabares  Encounter Date: 07/03/2024   YOB: 1970  Patient Number: [id number]    Hematology / Oncology Progress Note      HPI / REASON FOR VISIT: Yecenia Tabares is a 53 y.o. female initially seen by Dr. Espana for iron deficiency, pulmonary embolism and left leg DVT.  The patient presented to the hospital on 12/20/2021 with shortness of breath and was found to have an elevated D-dimer along with low iron studies.  A CT chest angiogram on 12/20/2021 showed diffuse bilateral pulmonary emboli involving the distal main pulmonary arteries extending into the subsegmental branches.  Mild right ventricular heart strain with RV to LV ratio measuring 1.41.  Scattered groundglass opacities may represent multifocal pneumonia and/or ischemic changes of the parenchyma.  The patient was started on a heparin drip.     Of note, the patient had J&J vaccine in April 2021 and reported difficulty since that time with a right facial droop and difficulty with her speech since May or June of this year (!!!)  She and her sister state that she has been sick since then on and off without stop and symptoms started 2 weeks after the vaccine. Her sister also states that she had a seizure at that time. She does have a history of syncope when she was younger. PPM was checked recently and as per patient and sister was told that it was working fine     Lower extremity Dopplers done on 12/21/21 showed evidence of a DVT in the common femoral, profunda femoris, superficial femoral, popliteal, peroneal and posterior tibial veins in the left lower extremity.  There was no evidence of a DVT in the right side.  The patient also was noted to have superficial thrombus in the saphenous junction in the left lower extremity.  She stated that around this time, she was  "mostly in bed due to thinking she had pneumonia.      In addition to all this, the patient was noted to have a hemoglobin of 6.8 with an MCV of 72.9.  She was given 1 unit of packed red blood cells and also was given Venofer 200 mg for 2 days (as per patient had 2 iron infusions although not documented in the Epic chart). Patient has a history of gastric sleeve procedure and may not absorb oral iron and will likely need IV Venofer transfusions as an outpatient     The patient denied any history of malignancy or previous hypercoagulable state but did state that her father had malignancy with brain involvement as well as family member with breast cancer.  The patient is a smoker.     The patient was started on Lovenox and then transitioned to Eliquis and then changed to  Xarelto (due to insurance) on 2021. She states that she takes the medications as directed without missing a dose.      She denies family history of clotting but there are many family members and the patient have had miscarriages (she personally had 2   In the early part of the pregnancies)     She complains of pain in the right lower leg \"burning if I keep it straight but goes away when I straighten the leg\".      She no longer gets menstruation (has not had one for 7-8 years). She denies bleeding anywhere else.  She denies any signs of PICA syndrome.      The inheritable thrombophilia panel includes the followin.  APC resistance/factor V Leiden WNL  2.  Prothrombin gene mutation  3.  Protein C functional assay shows elevation in protein C antigen at 165 and elevation of protein C functional at 190 --elevations in protein C are usually not clinically significant but may indicate chronic inflammation or inflammatory disorder  4. protein S free protein and antigen assay shows elevation of free antigen at 145 but normal total antigen at 127 and functional protein S at 123 -- elevations in protein C are usually not clinically significant but " may indicate chronic inflammation or inflammatory disorder  5. Antithrombin III -elevated at 154 with a normal antigen at 110 -elevations in Antithrombin III do not correspond with clotting disorder but may theoretically correspond to a bleeding disorder which this patient does not appear to have  6.  PT/PTT/INR  7.  Anticardiolipin antibody WNL  8.  Beta-2 glycoprotein antibodies WNL  9.  Lupus anticoagulant POSITIVE -- if positive again in 12 weeks, would qualify patient for APLA Syndrome and would recommend change of anticoagulants to coumadin.  However, please note, patient is taking DOAC which can interfere and give a false positive lupus anticoagulant. Please note:  LAC can be FALSELY ELEVATED in patients on DOAC.  In an attempt to make sure that the LAC is not a false positive, in those patients taking a DOAC, can take off for 48 hours and recheck the LAC       She received Venofer 200 mg on November 22, December 4, 11 and 18, 2023 and tolerated the infusions well.     She had Venofer on Jan 5 and 18, 2024.    She continues to take Xarelto 20 mg daily w/o any s/s of bleeding or toxicity.     INTERVAL HISTORY     History of Present Illness  The patient presents for evaluation of multiple medical concerns.    The patient sought emergency care at [facility][1] in 05/2023 due to respiratory distress, which was attributed to chronic obstructive pulmonary disease (COPD). Despite this, she reported persistent feelings of anxiety and depression, which she perceived as a significant flare-up.     She underwent iron infusions with Venofer 200 mg on 03/26/2024 and 04/09/2024.  Since her last visit, she has undergone a heart catheterization, which revealed no blockage, but an irregular back valve was observed.       She is not currently receiving B12 injections at [facility] due to financial constraints. She continues to take Xarelto 20 mg and takes folic acid once daily.           LABS    Lab Results - Last 18 Months   Lab  "Units 06/14/24 0917 03/20/24  1102 02/07/24  1012 12/27/23  1145 11/22/23  0953 11/15/23  1329   HEMOGLOBIN g/dL 14.3 13.6 12.8 11.3* 9.3* 8.7*   HEMATOCRIT % 46.3 45.6 42.4 39.6 34.9 31.4*   MCV fL 91.1 88.9 84.5 77.5* 72.9* 72.2*   WBC 10*3/mm3 8.34 6.05 7.08 6.99 6.61 7.09   RDW % 15.5* 18.5* 27.0* 30.2* 21.0* 21.1*   MPV fL 14.8*  --   --   --   --   --    PLATELETS 10*3/mm3 217 209 239 234 305 339   IMM GRAN % % 0.4  --   --   --   --   --    NEUTROS ABS 10*3/mm3 5.33 3.38 3.71 3.70 3.83 2.98   LYMPHS ABS 10*3/mm3 2.06 1.88 2.39 2.31  --   --    MONOS ABS 10*3/mm3 0.52 0.31 0.48 0.39  --   --    EOS ABS 10*3/mm3 0.36 0.42* 0.43* 0.53* 0.53* 0.78*   BASOS ABS 10*3/mm3 0.04 0.04 0.05 0.05 0.07 0.07   IMMATURE GRANS (ABS) 10*3/mm3 0.03  --   --   --   --   --    NRBC /100 WBC  --   --   --   --   --  1.0*   NEUTROPHIL % %  --   --   --   --  57.0 41.0*   MONOCYTES % %  --   --   --   --  4.0* 6.0   BASOPHIL % %  --   --   --   --  1.0 1.0   ATYP LYMPH % %  --   --   --   --  4.0 2.0   ANISOCYTOSIS   --   --   --   --  Slight/1+ Slight/1+       Lab Results - Last 18 Months   Lab Units 06/14/24 0917 03/20/24 1102 02/07/24  1012 12/27/23  1145 11/15/23  1329   GLUCOSE mg/dL 100* 83 107* 91 104*   SODIUM mmol/L 138 138 140 142 142   POTASSIUM mmol/L 4.1 4.4 3.6 3.6 4.0   CO2 mmol/L 22.0 24.0 19.0* 21.0* 24.0   CHLORIDE mmol/L 100 103 105 109* 108*   ANION GAP mmol/L 16.0* 11.0 16.0* 12.0 10.0   CREATININE mg/dL 0.75 0.67 0.70 0.69 0.69   BUN mg/dL 17 14 17 10 14   BUN / CREAT RATIO  22.7 20.9 24.3 14.5 20.3   CALCIUM mg/dL 9.2 9.1 8.9 9.3 8.7   ALK PHOS U/L 84 74 70 63 73   TOTAL PROTEIN g/dL 7.5 7.4 7.0 7.0 6.6   ALT (SGPT) U/L 29 25 47* 9 10   AST (SGOT) U/L 14 16 33* 11 11   BILIRUBIN mg/dL 0.3 0.2 0.2 0.2 0.2   ALBUMIN g/dL 4.3 4.3 3.8 4.2 3.7   GLOBULIN gm/dL 3.2 3.1 3.2 2.8 2.9       No results for input(s): \"MSPIKE\", \"KAPPALAMB\", \"IGLFLC\", \"URICACID\", \"FREEKAPPAL\", \"CEA\", \"LDH\", \"REFLABREPO\" in the last " "[id number] hours.    Lab Results - Last 18 Months   Lab Units 06/14/24  0917 03/20/24  1102 02/07/24  1012 12/27/23  1145 11/15/23  1329   IRON mcg/dL 92 57 60 49 16*   TIBC mcg/dL 510 481 423 428 475   IRON SATURATION (TSAT) % 18* 12* 14* 11* 3*   FERRITIN ng/mL 44.06 22.33 72.89 139.20 10.09*   FOLATE ng/mL >20.00 4.47*  --   --  4.90         PAST MEDICAL HISTORY:  ALLERGIES:  Allergies   Allergen Reactions    Bee Venom Swelling    Erythromycin Shortness Of Breath    Penicillins Hives    Ampicillin Hives     CURRENT MEDICATIONS:  Outpatient Encounter Medications as of 7/3/2024   Medication Sig Dispense Refill    albuterol (PROVENTIL) (2.5 MG/3ML) 0.083% nebulizer solution Take 2.5 mg by nebulization Every 4 (Four) Hours As Needed for Wheezing.      albuterol sulfate  (90 Base) MCG/ACT inhaler Inhale 2 puffs Every 4 (Four) Hours As Needed for Wheezing.      Atogepant (Qulipta) 60 MG tablet Take 1 tablet by mouth Daily. 30 tablet 11    butalbital-acetaminophen-caffeine (FIORICET, ESGIC) -40 MG per tablet Take 1 tablet by mouth Daily As Needed for Headache.      DULoxetine (CYMBALTA) 60 MG capsule Take 90 mg by mouth Every Night. Pt takes a 30 mg and a 60 mg for a total of 90 mg      escitalopram (LEXAPRO) 10 MG tablet Take 1 tablet by mouth Daily.      folic acid (FOLVITE) 1 MG tablet TAKE 1 TABLET BY MOUTH DAILY. 30 tablet 2    gabapentin (NEURONTIN) 300 MG capsule Take 1 capsule by mouth 3 (Three) Times a Day.      ipratropium-albuterol (DUO-NEB) 0.5-2.5 mg/3 ml nebulizer       meclizine (ANTIVERT) 25 MG tablet Take 1 tablet by mouth 3 (Three) Times a Day As Needed for Dizziness.      Needle, Disp, 25G X 1\" misc 10 each Daily. 2 each 0    oxyCODONE-acetaminophen (PERCOCET) 7.5-325 MG per tablet Take 1 tablet by mouth 3 (Three) Times a Day As Needed.      pantoprazole (PROTONIX) 40 MG EC tablet Take 1 tablet by mouth 2 (Two) Times a Day.      promethazine (PHENERGAN) 25 MG tablet Take 0.5 tablets by mouth " Every 6 (Six) Hours As Needed for Nausea or Vomiting.      rivaroxaban (XARELTO) 20 MG tablet Take 1 tablet by mouth Daily.      rOPINIRole (REQUIP) 1 MG tablet Take 1 tablet by mouth 2 (Two) Times a Day. Take 1 hour before bedtime.      Ubrelvy 100 MG tablet Take 1 tablet by mouth As Needed (migraine).       No facility-administered encounter medications on file as of 7/3/2024.     ADULT ILLNESSES:  Patient Active Problem List   Diagnosis Code    Pulmonary embolism, bilateral I26.99    Microcytic anemia D50.9    Tobacco abuse Z72.0    Chronic back pain M54.9, G89.29    Obesity, Class III, BMI 40-49.9 (morbid obesity) E66.01    Acute deep vein thrombosis (DVT) of left lower extremity I82.402    Iron deficiency anemia D50.9    B12 deficiency E53.8     SURGERIES:  Past Surgical History:   Procedure Laterality Date    BREAST SURGERY      reduction and lift    CHOLECYSTECTOMY      DILATATION AND CURETTAGE      GASTRIC SLEEVE LAPAROSCOPIC  2016    INSERT / REPLACE / REMOVE PACEMAKER      at 23 years old due to syncope    PACEMAKER IMPLANTATION      REDUCTION MAMMAPLASTY       HEALTH MAINTENANCE ITEMS:  Health Maintenance Due   Topic Date Due    BMI FOLLOWUP  Never done    Pneumococcal Vaccine 0-64 (1 of 2 - PCV) Never done    ZOSTER VACCINE (1 of 2) Never done    HEPATITIS C SCREENING  Never done    ANNUAL WELLNESS VISIT  Never done    PAP SMEAR  Never done    LUNG CANCER SCREENING  12/20/2022    COVID-19 Vaccine (2 - 2023-24 season) 09/01/2023    MAMMOGRAM  02/14/2024       <no information>  Last Completed Colonoscopy            COLORECTAL CANCER SCREENING (COLONOSCOPY - Every 10 Years) Next due on 5/27/2031 12/20/2021  Fecal Occult Blood component of POC Occult Blood Stool    05/27/2021  Outside Procedure: COLONOSCOPY                  Immunization History   Administered Date(s) Administered    COVID-19 (DooBop) 04/07/2021    DTP 02/01/1972, 04/01/1972, 06/01/1972, 05/30/1974, 08/01/1977    Measles 02/01/1973     Mumps 02/17/1981    Polio, Unspecified 02/01/1972, 04/01/1972, 06/01/1972, 05/30/1974, 03/01/1977    Td, Unspecified 08/22/1989    Tdap 03/07/2019     Last Completed Mammogram       This patient has no relevant Health Maintenance data.              FAMILY HISTORY:  Family History   Problem Relation Age of Onset    Stroke Mother     Kidney failure Mother     Diabetes Mother     Miscarriages / Stillbirths Mother         x2    Cancer Father 60        brain    Diabetes Sister     Hypertension Sister     Kidney disease Brother     Depression Brother     Miscarriages / Stillbirths Maternal Grandmother         x3    Heart attack Sister     Transient ischemic attack Sister         non-hemorrhagoic    Multiple sclerosis Sister     Miscarriages / Stillbirths Sister         x1    Diabetes Sister     Obesity Sister     Diabetes Sister     Stroke Sister     Diabetes Brother     Obesity Brother     Gout Brother     Breast cancer Maternal Aunt      SOCIAL HISTORY:  Social History     Socioeconomic History    Marital status:    Tobacco Use    Smoking status: Every Day     Current packs/day: 1.00     Average packs/day: 1 pack/day for 30.0 years (30.0 ttl pk-yrs)     Types: Cigarettes     Passive exposure: Never    Smokeless tobacco: Never   Vaping Use    Vaping status: Never Used   Substance and Sexual Activity    Alcohol use: No    Drug use: No    Sexual activity: Defer       REVIEW OF SYSTEMS:  Review of Systems   Constitutional:  Positive for fatigue. Negative for fever.   HENT:  Negative for trouble swallowing.    Respiratory:  Positive for shortness of breath. Negative for cough.    Cardiovascular:  Negative for chest pain, palpitations and leg swelling.        Pt has pacemaker   Gastrointestinal:  Negative for nausea and vomiting.   Genitourinary:  Negative for hematuria.   Musculoskeletal:  Negative for arthralgias and myalgias.   Skin:  Negative for rash, skin lesions and wound.   Neurological:  Negative for  "dizziness, syncope, memory problem and confusion.   Psychiatric/Behavioral:  Negative for suicidal ideas and depressed mood. The patient is not nervous/anxious.        /82   Pulse 67   Temp 97.6 °F (36.4 °C)   Resp 16   Ht 160 cm (63\")   Wt 103 kg (226 lb 11.2 oz)   SpO2 97%   BMI 40.16 kg/m²  Body surface area is 2.04 meters squared.    Pain Score    07/03/24 0849   PainSc:   8   PainLoc: Generalized            Physical Exam  Constitutional:       Appearance: Normal appearance.   HENT:      Head: Normocephalic and atraumatic.   Cardiovascular:      Rate and Rhythm: Normal rate and regular rhythm.   Pulmonary:      Effort: Pulmonary effort is normal.      Breath sounds: Normal breath sounds.   Abdominal:      General: Bowel sounds are normal.      Palpations: Abdomen is soft.   Musculoskeletal:      Right lower leg: No edema.      Left lower leg: No edema.   Skin:     General: Skin is warm and dry.   Neurological:      Mental Status: She is alert and oriented to person, place, and time.   Psychiatric:         Attention and Perception: Attention normal.         Mood and Affect: Mood normal.         Judgment: Judgment normal.         Yecenia Tabares reports a pain score of 8.  Given her pain assessment as noted, treatment options were discussed and the following options were decided upon as a follow-up plan to address the patient's pain: continuation of current treatment plan for pain and pt takes gabapentin, percocet and Fioricet .           ASSESSMENT / PLAN    1. Iron deficiency anemia, unspecified iron deficiency anemia type    2. Low serum vitamin B12    3. History of DVT (deep vein thrombosis)    4. Anticoagulant long-term use    5. Folate deficiency        ASSESSMENT:     Iron Deficiency   -Pt received Venofer 200 mg on November 22, December 4, 11 and 18, 2023  - Jan 5, 18, March 26, April 9, 2024  -Labs:  Iron 92, Ferritin 44, Sat 18%, TIBC 510  -Will order one additional Venofer infusion "     Low B12   -States no longer getting B12 injections   -Will do a one time B12 injection today and pt will let us know if she would like to continue here or with PCP    History of DVT  Long term Anticoagulation   -Pt is taking Xarelto 20 mg PO daily   -Pt denies s/s of bleeding or toxicity        5.  Folate Deficiency    -Folate > 20  -Continue Folic Acid 1mg PO daily       PLAN:  Venofer 200 mg IV weekly x 1   RTC in 3 months   Patient will have preoffice labs.    Orders have been signed.    Continue current medications, treatment plans and follow up with PCP and any other providers.  Care discussed with patient.  Understanding expressed.  Patient agreeable with plan.        ACP discussion was held with the patient during this visit. Patient has an advance directive in EMR which is still valid.           Linh Lau, APRN  07/03/2024

## 2024-07-06 RX ORDER — ACETAMINOPHEN 325 MG/1
650 TABLET ORAL ONCE
OUTPATIENT
Start: 2024-07-12

## 2024-07-06 RX ORDER — FAMOTIDINE 10 MG/ML
20 INJECTION, SOLUTION INTRAVENOUS AS NEEDED
OUTPATIENT
Start: 2024-07-12

## 2024-07-06 RX ORDER — DIPHENHYDRAMINE HCL 25 MG
25 CAPSULE ORAL ONCE
OUTPATIENT
Start: 2024-07-12

## 2024-07-06 RX ORDER — DIPHENHYDRAMINE HYDROCHLORIDE 50 MG/ML
50 INJECTION INTRAMUSCULAR; INTRAVENOUS AS NEEDED
OUTPATIENT
Start: 2024-07-12

## 2024-07-06 RX ORDER — SODIUM CHLORIDE 9 MG/ML
20 INJECTION, SOLUTION INTRAVENOUS ONCE
OUTPATIENT
Start: 2024-07-12

## 2024-07-12 ENCOUNTER — TELEPHONE (OUTPATIENT)
Dept: ONCOLOGY | Facility: CLINIC | Age: 54
End: 2024-07-12
Payer: MEDICARE

## 2024-07-12 NOTE — TELEPHONE ENCOUNTER
Caller: SHON SNIDER    Pharmacy Name:  [facility]    Reference Number (if applicable): [id number]    Pharmacy representative phone number: [phone] OPT[facility] 5    What medication are you calling in regards to: VENOFER    What question does the pharmacy have: NEEDS PATIENTS WEIGHT    Who is the provider that prescribed the medication: FADY SHABAZZ

## 2024-09-17 ENCOUNTER — TRANSCRIBE ORDERS (OUTPATIENT)
Dept: ADMINISTRATIVE | Facility: HOSPITAL | Age: 54
End: 2024-09-17
Payer: MEDICARE

## 2024-09-17 DIAGNOSIS — Z12.31 ENCOUNTER FOR SCREENING MAMMOGRAM FOR MALIGNANT NEOPLASM OF BREAST: Primary | ICD-10-CM

## 2024-09-22 LAB
NCCN CRITERIA FLAG: NORMAL
TYRER CUZICK SCORE: 10.9

## 2024-10-15 DIAGNOSIS — E53.8 FOLATE DEFICIENCY: ICD-10-CM

## 2024-10-15 RX ORDER — FOLIC ACID 1 MG/1
1 TABLET ORAL DAILY
Qty: 30 TABLET | Refills: 3 | Status: SHIPPED | OUTPATIENT
Start: 2024-10-15

## 2025-01-16 DIAGNOSIS — E53.8 FOLATE DEFICIENCY: ICD-10-CM

## 2025-01-16 RX ORDER — FOLIC ACID 1 MG/1
1 TABLET ORAL DAILY
Qty: 30 TABLET | Refills: 3 | Status: SHIPPED | OUTPATIENT
Start: 2025-01-16

## 2025-05-16 ENCOUNTER — TELEPHONE (OUTPATIENT)
Dept: NEUROLOGY | Facility: CLINIC | Age: 55
End: 2025-05-16
Payer: MEDICARE

## 2025-05-16 DIAGNOSIS — G43.719 INTRACTABLE CHRONIC MIGRAINE WITHOUT AURA AND WITHOUT STATUS MIGRAINOSUS: ICD-10-CM

## 2025-05-16 RX ORDER — ATOGEPANT 60 MG/1
60 TABLET ORAL DAILY
Qty: 20 TABLET | Refills: 0 | COMMUNITY
Start: 2025-05-16

## 2025-05-16 RX ORDER — ATOGEPANT 60 MG/1
60 TABLET ORAL DAILY
Qty: 30 TABLET | Refills: 6 | Status: SHIPPED | OUTPATIENT
Start: 2025-05-16

## 2025-05-16 NOTE — TELEPHONE ENCOUNTER
Caller: Yecenia Tabaers    Relationship to patient: Self      Best call back number: [phone]     Provider: DR CADE    Medication PA needed: ZORAN    Reason for call/Prior Auth: THE PT'S PCP TRIED TO DO THE PA AND IT WAS DECLINED. SHE IS OUT OF THE RX AND ASKS IF WE COULD TRY TO GET THE PA APPROVAL     SHE ALSO ASKS IF THERE ARE ANY SAMPLES COULD GET TO LAST UNTIL THEY CAN REFILL THE RX.

## 2025-05-16 NOTE — TELEPHONE ENCOUNTER
Called She can have 5 boxes of qulipta pt says she will be coming by to pick this up today. I also reminded her that she needed to keep her next appointment with us.

## 2025-06-03 ENCOUNTER — TELEPHONE (OUTPATIENT)
Dept: NEUROLOGY | Facility: CLINIC | Age: 55
End: 2025-06-03
Payer: MEDICARE

## 2025-06-03 NOTE — TELEPHONE ENCOUNTER
RETURNED PT'S CALL ABOUT BEING OUT OF  QULIPTA.  SHE WAS WANTING TO COME IN TODAY WHEN SHE CALLED EARLIER TODAY.  I LET HER KNOW SHE SHOULD HAVE A FEW QULIPTA TABLETS LEFT SINCE SHE RECEIVED A 20 PILLS AS A SAMPLE ON 5/16/25.  SHE IS GOING TO KEEP HER APPOINTMENT FOR 2:00 TOMORROW.

## 2025-06-04 ENCOUNTER — OFFICE VISIT (OUTPATIENT)
Dept: NEUROLOGY | Facility: CLINIC | Age: 55
End: 2025-06-04
Payer: MEDICARE

## 2025-06-04 VITALS
HEART RATE: 100 BPM | WEIGHT: 220 LBS | OXYGEN SATURATION: 97 % | SYSTOLIC BLOOD PRESSURE: 134 MMHG | DIASTOLIC BLOOD PRESSURE: 82 MMHG | BODY MASS INDEX: 38.98 KG/M2 | HEIGHT: 63 IN

## 2025-06-04 DIAGNOSIS — G43.719 INTRACTABLE CHRONIC MIGRAINE WITHOUT AURA AND WITHOUT STATUS MIGRAINOSUS: Primary | ICD-10-CM

## 2025-06-04 DIAGNOSIS — G47.33 OBSTRUCTIVE SLEEP APNEA: ICD-10-CM

## 2025-06-04 RX ORDER — ATOGEPANT 60 MG/1
60 TABLET ORAL DAILY
Qty: 20 TABLET | Refills: 0 | Status: SHIPPED | OUTPATIENT
Start: 2025-06-04

## 2025-06-04 RX ORDER — ATOGEPANT 60 MG/1
60 TABLET ORAL DAILY
Qty: 90 TABLET | Refills: 3 | Status: SHIPPED | OUTPATIENT
Start: 2025-06-04 | End: 2026-06-04

## 2025-06-04 NOTE — PROGRESS NOTES
"  Neurology Consult Note    [facility]  [address], [address]  [place] [address]  Phone: [phone]  Fax: [phone]    Referring Provider:   No ref. provider found  Primary Care Provider:  Sushil Nash MD    Reason for Consult:  Chronic migraine  Subjective        History of Present Illness  54-year-old female seen for follow-up of chronic migraine.  She was last seen in clinic on 5/16/2024 by Dr. Jacque Maldonado.  During that encounter patient was reporting 1 bad migraine a month.  She does feel Qulipta was helping.    Today patient presents by herself.  Reports when she experienced an 15 migraine days a month.  This can vary between month-to-month.  She now she has not been having her Qulipta regularly as her insurance denied this and her PCP was unable get it approved.  We did recently give patient samples last month.  She does have history of sleep apnea however is intolerant of CPAP.  She was previously on home oxygen however does not wear this all the time as she only notes she needs it when she is \"short of breath\".    Preventative regimens tried:  Topiramate.  No clinical benefit.  Duloxetine.  Limited clinical benefit  Gabapentin.  Limited clinical benefit.  Qulipta.  Clinical benefit noted.    Abortive regimens tried:  Ubrelvy    Medication regimens contraindicated:  Propranolol due to history of asthma  Triptan therapy due to history of focal neurological deficits and cerebrovascular disease    Patient Active Problem List   Diagnosis    Pulmonary embolism, bilateral    Microcytic anemia    Tobacco abuse    Chronic back pain    Obesity, Class III, BMI 40-49.9 (morbid obesity)    Acute deep vein thrombosis (DVT) of left lower extremity    Iron deficiency anemia    B12 deficiency        Past Medical History:   Diagnosis Date    Anxiety     CHF (congestive heart failure)     Diabetes mellitus     GERD (gastroesophageal reflux disease)     Hypertension     Injury of back     " Myocardial infarction     Neuropathy     mostly in the LE's    Seizures     Spondylisthesis     Stroke         Social History     Socioeconomic History    Marital status:    Tobacco Use    Smoking status: Every Day     Current packs/day: 1.00     Average packs/day: 1 pack/day for 30.0 years (30.0 ttl pk-yrs)     Types: Cigarettes     Passive exposure: Never    Smokeless tobacco: Never   Vaping Use    Vaping status: Never Used   Substance and Sexual Activity    Alcohol use: No    Drug use: No    Sexual activity: Defer        Allergies   Allergen Reactions    Bee Venom Swelling    Erythromycin Shortness Of Breath    Penicillins Hives    Ampicillin Hives          Current Outpatient Medications:     albuterol (PROVENTIL) (2.5 MG/3ML) 0.083% nebulizer solution, Take 2.5 mg by nebulization Every 4 (Four) Hours As Needed for Wheezing., Disp: , Rfl:     albuterol sulfate  (90 Base) MCG/ACT inhaler, Inhale 2 puffs Every 4 (Four) Hours As Needed for Wheezing., Disp: , Rfl:     butalbital-acetaminophen-caffeine (FIORICET, ESGIC) -40 MG per tablet, Take 1 tablet by mouth Daily As Needed for Headache., Disp: , Rfl:     DULoxetine (CYMBALTA) 60 MG capsule, Take 90 mg by mouth Every Night. Pt takes a 30 mg and a 60 mg for a total of 90 mg, Disp: , Rfl:     escitalopram (LEXAPRO) 10 MG tablet, Take 1 tablet by mouth Daily., Disp: , Rfl:     folic acid (FOLVITE) 1 MG tablet, TAKE 1 TABLET BY MOUTH DAILY., Disp: 30 tablet, Rfl: 3    gabapentin (NEURONTIN) 300 MG capsule, Take 1 capsule by mouth 3 (Three) Times a Day., Disp: , Rfl:     ipratropium-albuterol (DUO-NEB) 0.5-2.5 mg/3 ml nebulizer, , Disp: , Rfl:     meclizine (ANTIVERT) 25 MG tablet, Take 1 tablet by mouth 3 (Three) Times a Day As Needed for Dizziness., Disp: , Rfl:     oxyCODONE-acetaminophen (PERCOCET) 7.5-325 MG per tablet, Take 1 tablet by mouth 3 (Three) Times a Day As Needed., Disp: , Rfl:     pantoprazole (PROTONIX) 40 MG EC tablet, Take 1  "tablet by mouth 2 (Two) Times a Day., Disp: , Rfl:     promethazine (PHENERGAN) 25 MG tablet, Take 0.5 tablets by mouth Every 6 (Six) Hours As Needed for Nausea or Vomiting., Disp: , Rfl:     rivaroxaban (XARELTO) 20 MG tablet, Take 1 tablet by mouth Daily., Disp: , Rfl:     rOPINIRole (REQUIP) 1 MG tablet, Take 1 tablet by mouth 2 (Two) Times a Day. Take 1 hour before bedtime., Disp: , Rfl:     Ubrelvy 100 MG tablet, Take 1 tablet by mouth As Needed (migraine)., Disp: , Rfl:     Atogepant (Qulipta) 60 MG tablet, Take 1 tablet by mouth Daily., Disp: 20 tablet, Rfl: 0    Atogepant (Qulipta) 60 MG tablet, Take 1 tablet by mouth Daily., Disp: 90 tablet, Rfl: 3    Needle, Disp, 25G X 1\" misc, 10 each Daily., Disp: 2 each, Rfl: 0    ubrogepant (Ubrelvy) 100 MG tablet, Take 1 tablet by mouth As Needed (at migraine). Take 1 tab at onset of migraine.  May repeat once in 2 hours if needed., Disp: 16 tablet, Rfl: 6    ubrogepant (Ubrelvy) 100 MG tablet, Take 1 tablet by mouth As Needed (at onset of migraine.). Take 1 tab at onset of migraine.  May repeat once in 2 hours if needed., Disp: 4 tablet, Rfl: 0       Objective   Vital Signs:   /82   Pulse 100   Ht 160 cm (63\")   Wt 99.8 kg (220 lb)   SpO2 97%   BMI 38.97 kg/m²       Physical Exam  Vitals and nursing note reviewed.   Constitutional:       Appearance: Normal appearance.   HENT:      Head: Normocephalic.   Eyes:      General: Lids are normal.      Extraocular Movements: Extraocular movements intact.   Pulmonary:      Effort: Pulmonary effort is normal. No respiratory distress.   Skin:     General: Skin is warm and dry.   Neurological:      Mental Status: She is alert.   Psychiatric:         Mood and Affect: Mood is anxious.         Speech: Speech normal.        Neurological Exam  Mental Status  Alert. Oriented to person, place, time and situation. Speech is normal. Language is fluent with no aphasia.    Cranial Nerves  CN III, IV, VI: Extraocular movements " intact bilaterally. Normal lids and orbits bilaterally.  CN VII: Full and symmetric facial movement.    Motor    Moves all extremities equally.    Gait  Casual gait is normal including stance, stride, and arm swing.      Result Review :   The following data was reviewed by: HELADIO Meyer on 06/04/2025:       Office Visit with Jacque Mladonado MD (05/16/2024)     EEG (11/30/2023 12:04)   Date of onset: 11/30/2023  Date of offset: 11/30/2023     Indication: spells     Technical description:  This is a 21 channel digital EEG recording that began on 1132 and ended on 1202.      Background:  Up to 9 Hz alpha activity is noted over the posterior head regions that is symmetric, well formed and reactive to eye closure.  The patient enters the drowsy state and sleeps during the record.  Sleep activities are symmetric and contain vertex sharp wave(s). Hyperventilation and photic stimulation were not performed and photic stimulation did not induce an abnormal driving response.  There are no marked continuous asymmetries between the two hemispheres.  No interictal epileptiform activity is present.     The EKG monitor shows a heart rate that varies within normal limits.     Clinical Interpretation:  This routine EEG recording is normal in the awake and sleep states.  No potentially epileptogenic activity, seizure activity, or focal slowing is present.        CT Head Without Contrast (10/15/2022 23:09)     Study Result    Narrative & Impression   EXAMINATION:  CT HEAD WO CONTRAST-  10/15/2022 11:01 PM CDT     HISTORY: Headache. Posterior neck swelling. Left shoulder pain. Earache.     TECHNIQUE: Multiple axial images were obtained through the brain without  contrast infusion. Multiplanar images were reconstructed.     DLP: 506 mGy-cm. Automated dosage reduction technique was utilized to  reduce patient dosage.     COMPARISON: No comparison study.     FINDINGS: There are no hemorrhage, edema or mass effect. The  ventricular  system is nondilated. There is an enlarged sella turcica with a  partially empty appearance. The visualized paranasal sinuses are clear.  The mastoid air cells are clear. No calvarial fracture is seen.     IMPRESSION:  1. No hemorrhage, edema or mass effect.  2. Enlarged sella turcica with a partially empty appearance.     This report was finalized on 10/16/2022 12:46 by Dr. Charanjit Smith MD.                        Impression:  Yecenia Tabares is a 54 y.o. female who presents for follow-up of migraine.  Overall patient is experiencing chronic migraines.  She has had a positive response to Qulipta in the past.  Will reinitiate this for preventative regimen as well as continue Ubrelvy as an abortive agent.  No red flags on exam today.    Diagnoses and all orders for this visit:    1. Intractable chronic migraine without aura and without status migrainosus (Primary)  -     Atogepant (Qulipta) 60 MG tablet; Take 1 tablet by mouth Daily.  Dispense: 20 tablet; Refill: 0  -     Atogepant (Qulipta) 60 MG tablet; Take 1 tablet by mouth Daily.  Dispense: 90 tablet; Refill: 3  -     ubrogepant (Ubrelvy) 100 MG tablet; Take 1 tablet by mouth As Needed (at migraine). Take 1 tab at onset of migraine.  May repeat once in 2 hours if needed.  Dispense: 16 tablet; Refill: 6  -     ubrogepant (Ubrelvy) 100 MG tablet; Take 1 tablet by mouth As Needed (at onset of migraine.). Take 1 tab at onset of migraine.  May repeat once in 2 hours if needed.  Dispense: 4 tablet; Refill: 0    2. Obstructive sleep apnea        Plan:  Continue Qulipta 60 mg tablet, 1 tablet daily for chronic migraine prevention.  20-day supply of samples provided.  Continue Ubrelvy 100 mg tablet, 1 tablet at initial onset of migraine symptoms.  May repeat once in 2 hours if needed.  4 samples provided to patient.  Contact our office any worsening migraines  Avoid known triggers  Encouraged regular use of oxygen at night to prevent headaches.  Consider  referral to sleep medicine for further evaluation.  Defer to primary care for further management of DESMOND due to our clinic not managing sleep disorders.  Follow-up with primary care as scheduled  Follow-up in our clinic 6 months or sooner if needed    The patient and I have discussed the plan of care and she is in full agreement at this time.     Follow Up   Return in about 6 months (around 12/4/2025) for Migraine.            Yusra Robert, HELADIO  06/04/25  14:13 CDT

## 2025-06-05 ENCOUNTER — TELEPHONE (OUTPATIENT)
Dept: NEUROLOGY | Facility: CLINIC | Age: 55
End: 2025-06-05

## 2025-06-05 NOTE — TELEPHONE ENCOUNTER
Provider: ALICIA LOVE    Caller: Yecenia Tabares    Relationship to Patient: Self    Phone Number: [phone]    Reason for Call: STATES BOTH QULIPTA & UBRELVY REQUIRE PRIOR AUTH. PLEASE ADVISE, THANK YOU.

## 2025-06-11 ENCOUNTER — TELEPHONE (OUTPATIENT)
Dept: NEUROLOGY | Facility: CLINIC | Age: 55
End: 2025-06-11
Payer: MEDICARE

## 2025-06-11 DIAGNOSIS — G43.719 INTRACTABLE CHRONIC MIGRAINE WITHOUT AURA AND WITHOUT STATUS MIGRAINOSUS: ICD-10-CM

## 2025-06-11 RX ORDER — PROMETHAZINE HYDROCHLORIDE 25 MG/1
12.5 TABLET ORAL EVERY 6 HOURS PRN
Qty: 9 TABLET | Refills: 0 | Status: SHIPPED | OUTPATIENT
Start: 2025-06-11

## 2025-06-11 RX ORDER — ATOGEPANT 60 MG/1
60 TABLET ORAL DAILY
Qty: 20 TABLET | Refills: 0 | COMMUNITY
Start: 2025-06-11 | End: 2026-06-11

## 2025-06-11 NOTE — TELEPHONE ENCOUNTER
Provider: SUSAN LEIJA    Caller: Yecenia Tabares    Relationship to Patient: Self    Pharmacy: [facility] - [place], [place] - [address] [facility] [phone] [facility] [phone] FX      Phone Number: [phone]     Reason for Call: THE PT STATED HER PHARMACY RECEIVED THE UBRELVY PA AND RX BUT NO THE QULIPTA PA AND RX. SHE STATED THE QULIPTA IS THE RX SHE REALLY NEEDS AND IS GETTING LOW ON HER SAMPLES.     THE PATIENT ALSO ASKS IF SUSAN SARABIA IS ABLE TO PRESCRIBE TO HER PHENERGAN DUE TO SHE IS HAVING ISSUES WITH HER PCP ON GETTING IT FILLED.     PLEASE ADVISE  THANK YOU

## 2025-06-11 NOTE — TELEPHONE ENCOUNTER
Called pt to let her know that I am still trying to get the qulipta approved they were supposed to fax me something the first of this week.  I will call them about this. And also to relay the following message from Yusra   He will send in a one time small fill until her PCP can send in more. Chronic use of phenergan can cause abnormal body movements. Caution on extended use. what pharmacy to you want this sent to?  There was no voice mail. If she does call back HUB may relay

## 2025-06-11 NOTE — TELEPHONE ENCOUNTER
Caller: Yecenia Tabares    Relationship to Patient: Self    Pharmacy: [facility] - [place], [place] - [address] [facility] [phone] [facility] [phone] FX     Phone Number: [phone]     Reason for Call: PT CALLED BACK AND ENCOUNTER WAS RELAYED AND SHE V/U.     SHE WANTS TO KNOW IF SHE CAN COME GET SAMPLES OF THE QULIPTA SINCE ITS STILL BEING PROCESSED. SHE ALSO STATED THE PHARMACY LISTED ABOVE IS WHERE SHE NEED ALL HER RX'S SENT.

## 2025-08-05 ENCOUNTER — TELEPHONE (OUTPATIENT)
Dept: NEUROLOGY | Facility: CLINIC | Age: 55
End: 2025-08-05
Payer: MEDICARE

## 2025-08-06 DIAGNOSIS — G43.719 INTRACTABLE CHRONIC MIGRAINE WITHOUT AURA AND WITHOUT STATUS MIGRAINOSUS: ICD-10-CM

## 2025-08-06 RX ORDER — ATOGEPANT 60 MG/1
60 TABLET ORAL DAILY
Qty: 90 TABLET | Refills: 3 | Status: SHIPPED | OUTPATIENT
Start: 2025-08-06 | End: 2026-08-06

## 2025-08-14 ENCOUNTER — OFFICE VISIT (OUTPATIENT)
Age: 55
End: 2025-08-14

## 2025-08-14 VITALS — HEIGHT: 63 IN | WEIGHT: 224 LBS | BODY MASS INDEX: 39.69 KG/M2

## 2025-08-14 DIAGNOSIS — R52 PAIN: Primary | ICD-10-CM

## 2025-08-14 DIAGNOSIS — M17.0 BILATERAL PRIMARY OSTEOARTHRITIS OF KNEE: ICD-10-CM

## 2025-08-14 RX ORDER — BUPIVACAINE HYDROCHLORIDE 5 MG/ML
3 INJECTION, SOLUTION EPIDURAL; INTRACAUDAL; PERINEURAL ONCE
Status: COMPLETED | OUTPATIENT
Start: 2025-08-14 | End: 2025-08-14

## 2025-08-14 RX ORDER — MELOXICAM 15 MG/1
15 TABLET ORAL DAILY PRN
Qty: 30 TABLET | Refills: 0 | Status: SHIPPED | OUTPATIENT
Start: 2025-08-14

## 2025-08-14 RX ORDER — OXYCODONE AND ACETAMINOPHEN 7.5; 325 MG/1; MG/1
1 TABLET ORAL 3 TIMES DAILY
COMMUNITY
Start: 2025-07-16

## 2025-08-14 RX ORDER — ATOGEPANT 60 MG/1
TABLET ORAL
COMMUNITY
Start: 2025-08-06

## 2025-08-14 RX ORDER — PROMETHAZINE HYDROCHLORIDE 25 MG/1
TABLET ORAL
COMMUNITY
Start: 2025-08-05

## 2025-08-14 RX ORDER — ESCITALOPRAM OXALATE 10 MG/1
10 TABLET ORAL DAILY
COMMUNITY
Start: 2025-05-15

## 2025-08-14 RX ORDER — DULOXETIN HYDROCHLORIDE 30 MG/1
30 CAPSULE, DELAYED RELEASE ORAL NIGHTLY
COMMUNITY
Start: 2025-05-15

## 2025-08-14 RX ORDER — RIVAROXABAN 20 MG/1
20 TABLET, FILM COATED ORAL DAILY
COMMUNITY
Start: 2025-05-15

## 2025-08-14 RX ORDER — ROPINIROLE 1 MG/1
1 TABLET, FILM COATED ORAL 2 TIMES DAILY
COMMUNITY
Start: 2025-05-15

## 2025-08-14 RX ORDER — MECLIZINE HYDROCHLORIDE 25 MG/1
25 TABLET ORAL 3 TIMES DAILY
COMMUNITY
Start: 2025-05-15

## 2025-08-14 RX ORDER — LIDOCAINE HYDROCHLORIDE 10 MG/ML
1 INJECTION, SOLUTION INFILTRATION; PERINEURAL ONCE
Status: COMPLETED | OUTPATIENT
Start: 2025-08-14 | End: 2025-08-14

## 2025-08-14 RX ORDER — BETAMETHASONE SODIUM PHOSPHATE AND BETAMETHASONE ACETATE 3; 3 MG/ML; MG/ML
6 INJECTION, SUSPENSION INTRA-ARTICULAR; INTRALESIONAL; INTRAMUSCULAR; SOFT TISSUE ONCE
Status: COMPLETED | OUTPATIENT
Start: 2025-08-14 | End: 2025-08-14

## 2025-08-14 RX ORDER — DULOXETIN HYDROCHLORIDE 60 MG/1
CAPSULE, DELAYED RELEASE ORAL
COMMUNITY
Start: 2025-05-15

## 2025-08-14 RX ORDER — AZITHROMYCIN 250 MG/1
TABLET, FILM COATED ORAL
COMMUNITY
Start: 2025-07-08

## 2025-08-14 RX ORDER — GABAPENTIN 300 MG/1
300 CAPSULE ORAL 3 TIMES DAILY
COMMUNITY
Start: 2025-07-16

## 2025-08-14 RX ORDER — UBROGEPANT 100 MG/1
TABLET ORAL
COMMUNITY
Start: 2025-08-05

## 2025-08-14 RX ORDER — BUTALBITAL, ACETAMINOPHEN AND CAFFEINE 300; 40; 50 MG/1; MG/1; MG/1
CAPSULE ORAL
COMMUNITY
Start: 2025-06-16

## 2025-08-14 RX ADMIN — BUPIVACAINE HYDROCHLORIDE 15 MG: 5 INJECTION, SOLUTION EPIDURAL; INTRACAUDAL; PERINEURAL at 09:50

## 2025-08-14 RX ADMIN — BETAMETHASONE SODIUM PHOSPHATE AND BETAMETHASONE ACETATE 6 MG: 3; 3 INJECTION, SUSPENSION INTRA-ARTICULAR; INTRALESIONAL; INTRAMUSCULAR; SOFT TISSUE at 09:49

## 2025-08-14 RX ADMIN — LIDOCAINE HYDROCHLORIDE 1 ML: 10 INJECTION, SOLUTION INFILTRATION; PERINEURAL at 09:51

## 2025-08-14 RX ADMIN — LIDOCAINE HYDROCHLORIDE 1 ML: 10 INJECTION, SOLUTION INFILTRATION; PERINEURAL at 09:50
